# Patient Record
Sex: FEMALE | Race: OTHER | HISPANIC OR LATINO | Employment: FULL TIME | URBAN - METROPOLITAN AREA
[De-identification: names, ages, dates, MRNs, and addresses within clinical notes are randomized per-mention and may not be internally consistent; named-entity substitution may affect disease eponyms.]

---

## 2017-01-03 ENCOUNTER — OFFICE VISIT (OUTPATIENT)
Dept: ENDOCRINOLOGY | Facility: CLINIC | Age: 27
End: 2017-01-03
Payer: COMMERCIAL

## 2017-01-03 VITALS
RESPIRATION RATE: 16 BRPM | DIASTOLIC BLOOD PRESSURE: 78 MMHG | BODY MASS INDEX: 22.53 KG/M2 | HEIGHT: 59 IN | SYSTOLIC BLOOD PRESSURE: 108 MMHG | WEIGHT: 111.75 LBS | HEART RATE: 89 BPM

## 2017-01-03 DIAGNOSIS — N91.0 PRIMARY AMENORRHEA: Primary | ICD-10-CM

## 2017-01-03 DIAGNOSIS — R41.840 ATTENTION DEFICIT: ICD-10-CM

## 2017-01-03 PROCEDURE — 99214 OFFICE O/P EST MOD 30 MIN: CPT | Mod: S$GLB,,, | Performed by: INTERNAL MEDICINE

## 2017-01-03 PROCEDURE — 99999 PR PBB SHADOW E&M-EST. PATIENT-LVL IV: CPT | Mod: PBBFAC,,, | Performed by: INTERNAL MEDICINE

## 2017-01-03 PROCEDURE — 1159F MED LIST DOCD IN RCRD: CPT | Mod: S$GLB,,, | Performed by: INTERNAL MEDICINE

## 2017-01-03 NOTE — PROGRESS NOTES
"Subjective: growth hormone deficiency       Patient ID: Ani Montoya is a 26 y.o. female.    Chief Complaint:  Growth hormone deficiency      History of Present Illness  Ms. Ani Montoya is a 26 year old female who was seen by  at initial visit in 2013 for primary amenorrhea and short stature. She is now the patient of Dr. Waller with last documented office visit in 2015. This is the patient's initial visit with me today.     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   Plan was to resume treatment with GH in 2014 but patient did not secondary to insurance coverage     She is 4'11" , all of her family members are above 5'10".   She is sexually active with one partner but reports dyspareunia. She has been to ER once in 2011 with vaginal laceration and required operative management.   Never had breast development. Had breast augmentation surgery in 2014   She was evaluated by  and pediatric endocrinologist in 2007 , was started on Gh replacement therapy which was held due to patient complaining of headache, plan was to resume in 1 week but patient never followed.     In 2014, patient was started on Estradiol, currently taking 0.5mg tab daily     Denies personal history of falls or fractures     BMD from 2014 showed osteoporosis to lumbar spine, total hip and femoral neck   NM bone age scan in 2014, showed bone age of a 14 year old     Review of Systems   Constitutional: Negative for fatigue and unexpected weight change.   HENT: Negative for trouble swallowing and voice change.    Eyes: Negative for visual disturbance.   Respiratory: Negative for shortness of breath.    Cardiovascular: Negative for palpitations and leg swelling.   Gastrointestinal: Negative for constipation and diarrhea.   Endocrine: Negative for cold intolerance and heat intolerance.   Genitourinary: Positive for dyspareunia and menstrual " problem (amenorrhea ).   Neurological: Negative for dizziness, light-headedness and headaches.   Psychiatric/Behavioral: Negative for behavioral problems and confusion.       Objective:   Physical Exam   Constitutional: She is oriented to person, place, and time.   Eyes: EOM are normal.   Neck: No thyromegaly present.   Cardiovascular: Normal rate and regular rhythm.    Pulmonary/Chest: Effort normal and breath sounds normal.   Beast augmentation    Lymphadenopathy:     She has no cervical adenopathy.   Neurological: She is alert and oriented to person, place, and time. She has normal reflexes.   Skin: Skin is warm.   Psychiatric: She has a normal mood and affect. Her behavior is normal.   Nursing note reviewed.      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 She has GH deficiency as well gonadotropin deficiency.   Goal of treatment:  1. To optimize final height  2. Optimize bone mass  3. Better quality of life.  4. In future may be fertility     #1. GH deficiency     - Start her on growth hormone treatment  - We had got approval for her GH but she never received.   - Again emphasized on getting GH therapy for above mentioned goals.       #2. Gonadotropin deficiency- primary amenorrhea.    - on estradiol 0.5 mg PO daily.       Will discuss case in detail with Dr. Waller for further recommendations   Patient request referral for PCP and GYN

## 2017-01-03 NOTE — MR AVS SNAPSHOT
[facility] - Endo/Diab/Metab  [address]  [facility] [address]  Phone: [phone]  Fax: [phone]                  Ani Jan   1/3/2017 9:00 AM   Office Visit    Description:  Female : 1990   Provider:  Kirsten Pineda NP   [facility]  [facility]           Reason for Visit     Growth hormone deficiency           Diagnoses this Visit        Comments    Primary amenorrhea    -  Primary     Attention deficit                To Do List           Future Appointments        Provider Department Dept Phone    2017 3:30 PM LEXY Mckenzie [facility] - Internal Medicine [phone]    2017 2:30 PM Kyle Cam MD [place] - [phone]      Goals (5 Years of Data)     None      [facility]     [facility] -  Assistance  Registered nurses in the [facility] On Call Center provide clinical advisement, health education, appointment booking, and other advisory services.  Call for this free service at [phone].             Medications           Message regarding Medications     Verify the changes and/or additions to your medication regime listed below are the same as discussed with your clinician today.  If any of these changes or additions are incorrect, please notify your healthcare provider.        STOP taking these medications     somatropin (NUTROPIN) 5 mg SolR Inject 1.2 mg into the skin once daily.           Verify that the below list of medications is an accurate representation of the medications you are currently taking.  If none reported, the list may be blank. If incorrect, please contact your healthcare provider. Carry this list with you in case of emergency.           Current Medications     estradiol (ESTRACE) 0.5 MG tablet Take 1 tablet (0.5 mg total) by mouth once daily.           Clinical Reference Information           Vital Signs - Last Recorded  Most recent update: 1/3/2017  9:33 AM by Chary  "KYLIE Busby MA    BP Pulse Resp Ht Wt BMI    108/78 (BP Location: Left arm, Patient Position: Sitting, BP Method: Manual) 89 16 4' 11" (1.499 m) 50.7 kg (111 lb 12.4 oz) 22.58 kg/m2      Blood Pressure          Most Recent Value    BP  108/78      Allergies as of 1/3/2017     No Known Allergies      Immunizations Administered on Date of Encounter - 1/3/2017     None      Orders Placed During Today's Visit      Normal Orders This Visit    Ambulatory Referral to Gynecology     Ambulatory referral to Internal Medicine       "

## 2017-01-04 ENCOUNTER — OFFICE VISIT (OUTPATIENT)
Dept: INTERNAL MEDICINE | Facility: CLINIC | Age: 27
End: 2017-01-04
Payer: COMMERCIAL

## 2017-01-04 ENCOUNTER — LAB VISIT (OUTPATIENT)
Dept: LAB | Facility: HOSPITAL | Age: 27
End: 2017-01-04
Payer: COMMERCIAL

## 2017-01-04 VITALS
WEIGHT: 109.81 LBS | BODY MASS INDEX: 22.14 KG/M2 | SYSTOLIC BLOOD PRESSURE: 108 MMHG | HEART RATE: 63 BPM | DIASTOLIC BLOOD PRESSURE: 66 MMHG | HEIGHT: 59 IN

## 2017-01-04 DIAGNOSIS — R53.83 FATIGUE, UNSPECIFIED TYPE: Primary | ICD-10-CM

## 2017-01-04 DIAGNOSIS — E23.0 HYPOPITUITARISM: ICD-10-CM

## 2017-01-04 DIAGNOSIS — R53.83 FATIGUE, UNSPECIFIED TYPE: ICD-10-CM

## 2017-01-04 PROBLEM — M81.0 OSTEOPOROSIS: Status: ACTIVE | Noted: 2017-01-04

## 2017-01-04 LAB — TSH SERPL DL<=0.005 MIU/L-ACNC: 1.67 UIU/ML

## 2017-01-04 PROCEDURE — 1159F MED LIST DOCD IN RCRD: CPT | Mod: S$GLB,,, | Performed by: STUDENT IN AN ORGANIZED HEALTH CARE EDUCATION/TRAINING PROGRAM

## 2017-01-04 PROCEDURE — 99999 PR PBB SHADOW E&M-EST. PATIENT-LVL III: CPT | Mod: PBBFAC,,, | Performed by: STUDENT IN AN ORGANIZED HEALTH CARE EDUCATION/TRAINING PROGRAM

## 2017-01-04 PROCEDURE — 84443 ASSAY THYROID STIM HORMONE: CPT

## 2017-01-04 PROCEDURE — 82024 ASSAY OF ACTH: CPT

## 2017-01-04 PROCEDURE — 36415 COLL VENOUS BLD VENIPUNCTURE: CPT

## 2017-01-04 PROCEDURE — 99203 OFFICE O/P NEW LOW 30 MIN: CPT | Mod: S$GLB,,, | Performed by: STUDENT IN AN ORGANIZED HEALTH CARE EDUCATION/TRAINING PROGRAM

## 2017-01-04 NOTE — MR AVS SNAPSHOT
"    [facility] - Internal Medicine  [address]  [place] [address]  Phone: [phone]  Fax: [phone]                  Ani Montoya   2017 3:30 PM   Office Visit    Description:  Female : 1990   Provider:  LEXY Mckenzie   Department:  [facility] - Internal Medicine           Reason for Visit     Establish Care           Diagnoses this Visit        Comments    Fatigue, unspecified type    -  Primary     Hypopituitarism                To Do List           Future Appointments        Provider Department Dept Phone    2017 2:30 PM MD [place] - [phone]      Goals (5 Years of Data)     None      Follow-Up and Disposition     Return in about 6 months (around 2017).      [facility]     [facility] Care [facility] -  Assistance  Registered nurses in the [facility] On Call Center provide clinical advisement, health education, appointment booking, and other advisory services.  Call for this free service at [phone].             Medications           Message regarding Medications     Verify the changes and/or additions to your medication regime listed below are the same as discussed with your clinician today.  If any of these changes or additions are incorrect, please notify your healthcare provider.             Verify that the below list of medications is an accurate representation of the medications you are currently taking.  If none reported, the list may be blank. If incorrect, please contact your healthcare provider. Carry this list with you in case of emergency.           Current Medications     estradiol (ESTRACE) 0.5 MG tablet Take 1 tablet (0.5 mg total) by mouth once daily.           Clinical Reference Information           Vital Signs - Last Recorded  Most recent update: 2017  3:29 PM by Shayy Boyd MA    BP Pulse Ht Wt BMI    108/66 (BP Location: Right arm, Patient Position: Sitting, BP Method: Manual) 63 4' 11" " (1.499 m) 49.8 kg (109 lb 12.6 oz) 22.17 kg/m2      Blood Pressure          Most Recent Value    BP  108/66      Allergies as of 1/4/2017     No Known Allergies      Immunizations Administered on Date of Encounter - 1/4/2017     None      Orders Placed During Today's Visit     Future Labs/Procedures Expected by Expires    ACTH  1/4/2017 3/5/2018    TSH  1/4/2017 3/5/2018

## 2017-01-04 NOTE — Clinical Note
Concentration problem is not major functional burden, i will order TSH,ACTH for now.  Not sure though about the hypopituitarism and how we manage those if hormonal deficiency is the case in that setting.  And should we start treating this patient osteoporosis with something else other than growth hormone ( calcium, Vit D and bisphosphonate )? Thank you

## 2017-01-04 NOTE — PROGRESS NOTES
Subjective:       Patient ID: Ani Montoya is a 26 y.o. female.    Chief Complaint: Establish Care    HPI     26 years old female with Hx of hypopituitarism, primary amenorrhea, osteoporosis and short stature present to the resident clinic to establish care and for concentration/fatigue complaint.   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 Also reported no major changes in her current financial of emotional state, also reported no OTC or herbal meds.   She follow endocrine, and recnetly found to have low growth hormone and planned to replace it.      Review of Systems   Constitutional: Negative for activity change, chills, fever and unexpected weight change.   HENT: Negative for ear discharge, ear pain, mouth sores and trouble swallowing.    Eyes: Negative for pain and redness.   Respiratory: Negative for cough and shortness of breath.    Cardiovascular: Negative for chest pain and palpitations.   Gastrointestinal: Negative for abdominal distention, abdominal pain, blood in stool and nausea.   Endocrine: Negative for polydipsia and polyphagia.   Genitourinary: Negative for dysuria and hematuria.   Musculoskeletal: Negative for gait problem and neck stiffness.   Skin: Negative for rash.   Neurological: Negative for dizziness, weakness and numbness.   Hematological: Negative for adenopathy. Does not bruise/bleed easily.   Psychiatric/Behavioral: Negative for decreased concentration and sleep disturbance.       Objective:      Physical Exam   Constitutional: She is oriented to person, place, and time. She appears well-nourished. No distress.   Short stature    HENT:   Head: Normocephalic and atraumatic.   Mouth/Throat: No oropharyngeal exudate.   Eyes: No scleral icterus.   Neck: Neck supple. No thyromegaly present.   Cardiovascular: Normal rate and regular rhythm.    Pulmonary/Chest: Effort normal. No respiratory distress. She has no wheezes.    Abdominal: Soft. She exhibits no distension.   Musculoskeletal: She exhibits no tenderness or deformity.   Lymphadenopathy:     She has no cervical adenopathy.   Neurological: She is alert and oriented to person, place, and time.   Skin: Skin is warm. No rash noted. No erythema.   Psychiatric: She has a normal mood and affect. Her behavior is normal. Judgment and thought content normal.       Assessment:       1. Fatigue, unspecified type    2. Hypopituitarism        Plan:         21 years old lady with concentration problem    Fatigue/concentration , unspecified type:  -- Dx include hormonal deficiency ( hypothyroid, hypoaldosteronism) or mood disorder or ADHD  -- will order TSH and ACTH  -- if comes back normal and after her growth hormone initiated still having the same problem, will refer to phycologist to rule out depression or ADHD.           Isabela Middleton   PGY 2  Internal medicine   [phone]

## 2017-01-06 LAB — ACTH PLAS-MCNC: 21 PG/ML

## 2017-01-09 ENCOUNTER — TELEPHONE (OUTPATIENT)
Dept: INTERNAL MEDICINE | Facility: CLINIC | Age: 27
End: 2017-01-09

## 2017-01-11 ENCOUNTER — OFFICE VISIT (OUTPATIENT)
Dept: OBSTETRICS AND GYNECOLOGY | Facility: CLINIC | Age: 27
End: 2017-01-11
Payer: COMMERCIAL

## 2017-01-11 VITALS
WEIGHT: 109.81 LBS | BODY MASS INDEX: 22.14 KG/M2 | DIASTOLIC BLOOD PRESSURE: 60 MMHG | SYSTOLIC BLOOD PRESSURE: 102 MMHG | HEIGHT: 59 IN

## 2017-01-11 DIAGNOSIS — N91.2: Primary | ICD-10-CM

## 2017-01-11 DIAGNOSIS — Z78.0 MENOPAUSE: ICD-10-CM

## 2017-01-11 DIAGNOSIS — N91.2 AMENORRHEA: ICD-10-CM

## 2017-01-11 DIAGNOSIS — Z78.0 MENOPAUSE: Primary | ICD-10-CM

## 2017-01-11 DIAGNOSIS — Z01.419 WELL WOMAN EXAM WITH ROUTINE GYNECOLOGICAL EXAM: ICD-10-CM

## 2017-01-11 PROCEDURE — 99395 PREV VISIT EST AGE 18-39: CPT | Mod: S$GLB,,, | Performed by: OBSTETRICS & GYNECOLOGY

## 2017-01-11 PROCEDURE — 76830 TRANSVAGINAL US NON-OB: CPT | Mod: S$GLB,,, | Performed by: OBSTETRICS & GYNECOLOGY

## 2017-01-11 PROCEDURE — 88175 CYTOPATH C/V AUTO FLUID REDO: CPT

## 2017-01-11 PROCEDURE — 99999 PR PBB SHADOW E&M-EST. PATIENT-LVL III: CPT | Mod: PBBFAC,,, | Performed by: OBSTETRICS & GYNECOLOGY

## 2017-01-11 RX ORDER — ESTRADIOL 0.5 MG/1
0.5 TABLET ORAL DAILY
Qty: 90 TABLET | Refills: 4 | Status: SHIPPED | OUTPATIENT
Start: 2017-01-11 | End: 2018-01-27 | Stop reason: SDUPTHER

## 2017-01-11 NOTE — PROGRESS NOTES
Subjective:       Patient ID: Ani Montoya is a 26 y.o. female.    Chief Complaint: Well Woman (annual she has a hormone deficency she need estrogen and pregestone)    This patient has a history of primary hypopituitarism.  She has received growth hormone in the past and is presently taking 0.5 mg estradiol daily.  She had minimal breast development and has bilateral augmentation mammoplasty the present time.  She development of other normal secondary sexual characteristics.  Discussion with the patient including potential for future pregnancy, hormonal support, and management of a diagnosis of osteoporosis.    HPI  Review of Systems   Gastrointestinal: Negative for abdominal distention, abdominal pain, constipation and nausea.   Genitourinary: Negative for dyspareunia, dysuria, genital sores, pelvic pain, vaginal bleeding and vaginal discharge.       Objective:      Physical Exam   Constitutional: She appears well-developed and well-nourished.   Pulmonary/Chest: Right breast exhibits no mass, no nipple discharge, no skin change and no tenderness. Left breast exhibits no mass, no nipple discharge, no skin change and no tenderness.   Abdominal: Soft. Bowel sounds are normal. She exhibits no distension and no mass. There is no tenderness. There is no rebound and no guarding. Hernia confirmed negative in the right inguinal area and confirmed negative in the left inguinal area.   Genitourinary: Rectum normal, vagina normal and uterus normal. No breast tenderness or discharge. There is no lesion on the right labia. There is no lesion on the left labia. Uterus is not fixed and not tender. Cervix exhibits no motion tenderness, no discharge and no friability. Right adnexum displays no mass, no tenderness and no fullness. Left adnexum displays no mass, no tenderness and no fullness. No tenderness in the vagina. No vaginal discharge found.   Lymphadenopathy:        Right: No inguinal adenopathy present.        Left: No  inguinal adenopathy present.     uterus is small, freely movable, and there are no adnexal masses.  Ultrasound shows uterine volume of 29.4 with no abnormalities and a 4.8 mm endometrial thickness.  Ovaries are normal but also small at 1.6 and 1.3 mL each.  Assessment:       1. Menopause    2. Well woman exam with routine gynecological exam    3. Amenorrhea        Plan:         annual GYN visit.  Will start patient on Prometrium 100 mg in addition to her estradiol.  Change in estradiol possible in the future but not at this time.  Patient will be given Boniva or other bisphosphonate bone builder but a bone density test will be done first.  Prescription will be called out when the results are called out to the patient.  Future possibilities of pregnancy and definitive answers to her questions are deferred to fertility experts a future time if she is not interested in pregnancy right now.

## 2017-01-11 NOTE — MR AVS SNAPSHOT
"    [place] - OB/GYN  [address]  [address], [address]  [place] [address]  Phone: [phone]                  Ani Montoya   2017 2:30 PM   Office Visit    Description:  Female : 1990   Provider:  Kyle Cam MD   Department:  [place] - OB/GYN           Reason for Visit     Well Woman                To Do List           Future Appointments        Provider Department Dept Phone    2017 2:30 PM Kyle Cam MD [place] - [phone]      Goals (5 Years of Data)     None      [facility]     [facility] On Call Nurse Care Line -  Assistance  Registered nurses in the [facility] On Call Center provide clinical advisement, health education, appointment booking, and other advisory services.  Call for this free service at [phone].             Medications           Message regarding Medications     Verify the changes and/or additions to your medication regime listed below are the same as discussed with your clinician today.  If any of these changes or additions are incorrect, please notify your healthcare provider.             Verify that the below list of medications is an accurate representation of the medications you are currently taking.  If none reported, the list may be blank. If incorrect, please contact your healthcare provider. Carry this list with you in case of emergency.           Current Medications     estradiol (ESTRACE) 0.5 MG tablet Take 1 tablet (0.5 mg total) by mouth once daily.           Clinical Reference Information           Vital Signs - Last Recorded  Most recent update: 2017  2:26 PM by Joyce García MA    BP Ht Wt BMI       102/60 4' 11" (1.499 m) 49.8 kg (109 lb 12.6 oz) 22.17 kg/m2       Blood Pressure          Most Recent Value    BP  102/60      Allergies as of 2017     No Known Allergies      Immunizations Administered on Date of Encounter - 2017     None      "

## 2017-01-12 ENCOUNTER — LAB VISIT (OUTPATIENT)
Dept: LAB | Facility: HOSPITAL | Age: 27
End: 2017-01-12
Attending: OBSTETRICS & GYNECOLOGY
Payer: COMMERCIAL

## 2017-01-12 ENCOUNTER — TELEPHONE (OUTPATIENT)
Dept: OBSTETRICS AND GYNECOLOGY | Facility: CLINIC | Age: 27
End: 2017-01-12

## 2017-01-12 DIAGNOSIS — N91.2 AMENORRHEA: ICD-10-CM

## 2017-01-12 DIAGNOSIS — M81.0 OSTEOPOROSIS: Primary | ICD-10-CM

## 2017-01-12 LAB
ESTRADIOL SERPL-MCNC: 26 PG/ML
FSH SERPL-ACNC: 2.2 MIU/ML
LH SERPL-ACNC: 1.4 MIU/ML
PROLACTIN SERPL IA-MCNC: 31 NG/ML

## 2017-01-12 PROCEDURE — 83001 ASSAY OF GONADOTROPIN (FSH): CPT

## 2017-01-12 PROCEDURE — 82670 ASSAY OF TOTAL ESTRADIOL: CPT

## 2017-01-12 PROCEDURE — 36415 COLL VENOUS BLD VENIPUNCTURE: CPT

## 2017-01-12 PROCEDURE — 83002 ASSAY OF GONADOTROPIN (LH): CPT

## 2017-01-12 PROCEDURE — 84146 ASSAY OF PROLACTIN: CPT

## 2017-01-12 RX ORDER — IBANDRONATE SODIUM 150 MG/1
150 TABLET, FILM COATED ORAL
Qty: 3 TABLET | Refills: 4 | Status: SHIPPED | OUTPATIENT
Start: 2017-01-12 | End: 2017-12-12

## 2017-01-12 RX ORDER — PROGESTERONE 100 MG/1
100 CAPSULE ORAL DAILY
Qty: 90 CAPSULE | Refills: 4 | Status: SHIPPED | OUTPATIENT
Start: 2017-01-12 | End: 2017-12-12

## 2017-01-12 NOTE — TELEPHONE ENCOUNTER
Patient states that she was supposed to have a rx sent in for Progesterone and Boniva. She also needs a bone density order.    Please advise.

## 2017-01-12 NOTE — TELEPHONE ENCOUNTER
----- Message from Hilaria Esqueda sent at 1/12/2017 10:06 AM CST -----  Contact: self/[phone]  Patient is scheduled for labs today and wants to know if it is fasting, she also stated that she was supposed to have a bone density scan but no orders are in the system.  Please advise

## 2017-01-16 ENCOUNTER — TELEPHONE (OUTPATIENT)
Dept: OBSTETRICS AND GYNECOLOGY | Facility: CLINIC | Age: 27
End: 2017-01-16

## 2017-01-16 ENCOUNTER — HOSPITAL ENCOUNTER (OUTPATIENT)
Dept: RADIOLOGY | Facility: HOSPITAL | Age: 27
Discharge: HOME | End: 2017-01-16
Attending: OBSTETRICS & GYNECOLOGY
Payer: COMMERCIAL

## 2017-01-16 DIAGNOSIS — M81.0 OSTEOPOROSIS: ICD-10-CM

## 2017-01-16 PROCEDURE — 77080 DXA BONE DENSITY AXIAL: CPT | Mod: 26,,, | Performed by: RADIOLOGY

## 2017-01-16 PROCEDURE — 77080 DXA BONE DENSITY AXIAL: CPT | Mod: TC

## 2017-01-16 NOTE — TELEPHONE ENCOUNTER
Inform the patient that her hormone levels are all low but in a good ratio to each other.  Recommend she take the hormones as prescribed.  Bone density test shows osteopenia but very close to osteoporosis.  It looks much better than the previous bone density test.  Okay to take the Boniva.

## 2017-01-17 ENCOUNTER — TELEPHONE (OUTPATIENT)
Dept: ENDOCRINOLOGY | Facility: CLINIC | Age: 27
End: 2017-01-17

## 2017-01-18 ENCOUNTER — TELEPHONE (OUTPATIENT)
Dept: OBSTETRICS AND GYNECOLOGY | Facility: CLINIC | Age: 27
End: 2017-01-18

## 2017-12-12 ENCOUNTER — HOSPITAL ENCOUNTER (EMERGENCY)
Facility: HOSPITAL | Age: 27
Discharge: HOME | End: 2017-12-12
Attending: EMERGENCY MEDICINE

## 2017-12-12 VITALS
RESPIRATION RATE: 18 BRPM | OXYGEN SATURATION: 99 % | HEART RATE: 63 BPM | SYSTOLIC BLOOD PRESSURE: 109 MMHG | WEIGHT: 108 LBS | HEIGHT: 59 IN | BODY MASS INDEX: 21.77 KG/M2 | TEMPERATURE: 98 F | DIASTOLIC BLOOD PRESSURE: 71 MMHG

## 2017-12-12 DIAGNOSIS — K59.00 CONSTIPATION, UNSPECIFIED CONSTIPATION TYPE: Primary | ICD-10-CM

## 2017-12-12 DIAGNOSIS — R10.9 ABDOMINAL PAIN: ICD-10-CM

## 2017-12-12 DIAGNOSIS — R07.9 CHEST PAIN: ICD-10-CM

## 2017-12-12 LAB
ALBUMIN SERPL BCP-MCNC: 3.8 G/DL
ALP SERPL-CCNC: 100 U/L
ALT SERPL W/O P-5'-P-CCNC: 18 U/L
ANION GAP SERPL CALC-SCNC: 8 MMOL/L
AST SERPL-CCNC: 22 U/L
B-HCG UR QL: NEGATIVE
BASOPHILS # BLD AUTO: 0.02 K/UL
BASOPHILS NFR BLD: 0.3 %
BILIRUB SERPL-MCNC: 0.3 MG/DL
BILIRUB UR QL STRIP: NEGATIVE
BUN SERPL-MCNC: 13 MG/DL
CALCIUM SERPL-MCNC: 9.1 MG/DL
CHLORIDE SERPL-SCNC: 107 MMOL/L
CLARITY UR: ABNORMAL
CO2 SERPL-SCNC: 24 MMOL/L
COLOR UR: YELLOW
CREAT SERPL-MCNC: 0.8 MG/DL
CTP QC/QA: YES
D DIMER PPP IA.FEU-MCNC: <0.19 MG/L FEU
DIFFERENTIAL METHOD: NORMAL
EOSINOPHIL # BLD AUTO: 0.2 K/UL
EOSINOPHIL NFR BLD: 1.9 %
ERYTHROCYTE [DISTWIDTH] IN BLOOD BY AUTOMATED COUNT: 14.1 %
EST. GFR  (AFRICAN AMERICAN): >60 ML/MIN/1.73 M^2
EST. GFR  (NON AFRICAN AMERICAN): >60 ML/MIN/1.73 M^2
GLUCOSE SERPL-MCNC: 104 MG/DL
GLUCOSE UR QL STRIP: NEGATIVE
HCT VFR BLD AUTO: 40.6 %
HGB BLD-MCNC: 13.4 G/DL
HGB UR QL STRIP: NEGATIVE
KETONES UR QL STRIP: NEGATIVE
LEUKOCYTE ESTERASE UR QL STRIP: NEGATIVE
LIPASE SERPL-CCNC: 19 U/L
LYMPHOCYTES # BLD AUTO: 2.1 K/UL
LYMPHOCYTES NFR BLD: 27.1 %
MCH RBC QN AUTO: 29.6 PG
MCHC RBC AUTO-ENTMCNC: 33 G/DL
MCV RBC AUTO: 90 FL
MONOCYTES # BLD AUTO: 0.6 K/UL
MONOCYTES NFR BLD: 7.8 %
NEUTROPHILS # BLD AUTO: 4.9 K/UL
NEUTROPHILS NFR BLD: 62.5 %
NITRITE UR QL STRIP: NEGATIVE
PH UR STRIP: 7 [PH] (ref 5–8)
PLATELET # BLD AUTO: 247 K/UL
PMV BLD AUTO: 9.9 FL
POTASSIUM SERPL-SCNC: 4.3 MMOL/L
PROT SERPL-MCNC: 7.8 G/DL
PROT UR QL STRIP: NEGATIVE
RBC # BLD AUTO: 4.52 M/UL
SODIUM SERPL-SCNC: 139 MMOL/L
SP GR UR STRIP: 1.01 (ref 1–1.03)
URN SPEC COLLECT METH UR: ABNORMAL
UROBILINOGEN UR STRIP-ACNC: NEGATIVE EU/DL
WBC # BLD AUTO: 7.81 K/UL

## 2017-12-12 PROCEDURE — 81025 URINE PREGNANCY TEST: CPT | Performed by: EMERGENCY MEDICINE

## 2017-12-12 PROCEDURE — 85025 COMPLETE CBC W/AUTO DIFF WBC: CPT

## 2017-12-12 PROCEDURE — 99284 EMERGENCY DEPT VISIT MOD MDM: CPT | Mod: 25

## 2017-12-12 PROCEDURE — 80053 COMPREHEN METABOLIC PANEL: CPT

## 2017-12-12 PROCEDURE — 83690 ASSAY OF LIPASE: CPT

## 2017-12-12 PROCEDURE — 81003 URINALYSIS AUTO W/O SCOPE: CPT

## 2017-12-12 PROCEDURE — 85379 FIBRIN DEGRADATION QUANT: CPT

## 2017-12-13 NOTE — ED PROVIDER NOTES
Encounter Date: 12/12/2017       History     Chief Complaint   Patient presents with    Abdominal Pain     left abdominal pain radiating to lower back since Friday, worse today.  No dysuria.  Patient has been constipated.  Last BM this morning hard and small amount.     Left-sided flank and abdominal pain that radiates into left lower chest and lower back.  The patient denies any injury.  Reports that taking deep breaths makes the symptoms worse.  Denies any nausea or vomiting.  No urinary symptoms.  No bowel or bladder dysfunction or lower extremity numbness or weakness. Patient has a history of constipation with recent worsening and small hard stool this morning.        The history is provided by the patient.   Abdominal Pain   The current episode started several days ago. The other symptoms of the illness do not include fever, shortness of breath, nausea or dysuria.   Symptoms associated with the illness do not include back pain.     Review of patient's allergies indicates:  No Known Allergies  Past Medical History:   Diagnosis Date    Hypopituitarism     Osteoporosis     Primary amenorrhea      Past Surgical History:   Procedure Laterality Date    breast aug Bilateral 2012     Family History   Problem Relation Age of Onset    Cancer Maternal Grandfather      unknown    Cancer Cousin      brain    Short stature Maternal Grandmother      4.11`     Social History   Substance Use Topics    Smoking status: Never Smoker    Smokeless tobacco: Not on file    Alcohol use Yes      Comment: occ     Review of Systems   Constitutional: Negative for fever.   HENT: Negative for sore throat.    Respiratory: Negative for shortness of breath.    Cardiovascular: Positive for chest pain. Negative for leg swelling.   Gastrointestinal: Positive for abdominal pain. Negative for nausea.   Genitourinary: Negative for dysuria.   Musculoskeletal: Negative for back pain.   Skin: Negative for rash.   Neurological: Negative for  weakness.   Hematological: Does not bruise/bleed easily.   All other systems reviewed and are negative.      Physical Exam     Initial Vitals [12/12/17 1612]   BP Pulse Resp Temp SpO2   (!) 138/93 74 16 97.5 °F (36.4 °C) --      MAP       108         Physical Exam    Nursing note and vitals reviewed.  Constitutional: She appears well-developed and well-nourished.   HENT:   Head: Normocephalic and atraumatic.   Eyes: Conjunctivae and EOM are normal. Pupils are equal, round, and reactive to light. Right eye exhibits no discharge. Left eye exhibits no discharge. No scleral icterus.   Neck: Normal range of motion. Neck supple.   Cardiovascular: Normal rate, regular rhythm and normal heart sounds. Exam reveals no gallop and no friction rub.    No murmur heard.  Pulmonary/Chest: Breath sounds normal. No stridor. No respiratory distress. She has no wheezes. She has no rhonchi. She has no rales.   Abdominal: Soft. Bowel sounds are normal. She exhibits no distension and no mass. There is no tenderness. There is no rebound and no guarding.   Neurological: She is alert and oriented to person, place, and time. She has normal strength. No cranial nerve deficit or sensory deficit.   Skin: Skin is warm and dry.   Psychiatric: She has a normal mood and affect. Her behavior is normal. Judgment and thought content normal.         ED Course   Procedures  Labs Reviewed   URINALYSIS - Abnormal; Notable for the following:        Result Value    Appearance, UA Hazy (*)     All other components within normal limits   CBC W/ AUTO DIFFERENTIAL   COMPREHENSIVE METABOLIC PANEL   LIPASE   D DIMER, QUANTITATIVE   POCT URINE PREGNANCY        Imaging Results          X-Ray Chest PA And Lateral (Final result)  Result time 12/12/17 17:06:38    Final result by Kerry Villarreal MD (12/12/17 17:06:38)                 Impression:     No acute cardiopulmonary process.          Electronically signed by: KERRY VILLARREAL MD  Date:      12/12/17  Time:    17:06              Narrative:    Chest PA and lateral    Indication:Chest pain    Comparison:None    Findings:  The cardiomediastinal silhouette is not enlarged.  There is no pleural effusion.  The trachea is midline.  The lungs are symmetrically expanded bilaterally without evidence of acute parenchymal process. No large focal consolidation seen.  There is no pneumothorax.  The osseous structures are unremarkable.                             X-Ray Abdomen AP 1 View (KUB) (Final result)  Result time 12/12/17 17:07:59    Final result by Kerry Villarreal MD (12/12/17 17:07:59)                 Impression:      1.  Nonobstructive bowel gas pattern, possible left nephrolithiasis versus calcification of the bowel (latter favored).      Electronically signed by: KERRY VILLARREAL MD  Date:     12/12/17  Time:    17:07              Narrative:    Abdomen AP 1 view    Clinical history: Unspecified abdominal pain    Comparison: None    Findings:  Single view.    Air and stool is seen within the large bowel, and projected over the rectum.  No focally dilated small bowel loops.  No definite calcification to suggest nephrolithiasis or cholelithiasis noting punctate foci of calcification project over the expected location of the left kidney however are suspected to reside within bowel although cannot be confirmed.  No acute osseous abnormality.  No convincing large volume free air noting supine technique.                                 Medical Decision Making:   Initial Assessment:   Nonreproducible left lower chest and back pain.  The symptoms are somewhat pleuritic.  The patient is on estrogen.  D-dimer and basic labs including urine to evaluate potential causes of pain.    Differential Diagnosis:   Myocardial ischemia, pericarditis, pulmonary embolus, chest wall pain, pleural inflammation and pulmonary infectious causes, musculoskeletal causes, kidney stone, pyelonephritis, shingles, and intra-abdominal  causes such as diverticulitis and appendicitis, constipation    Clinical Tests:   Lab Tests: Ordered and Reviewed  ED Management:  Patient improved with treatment in the emergency department and comfortable going home. Discussed reasons to return and importance of followup.  Patient understands that the emergency visit today is primarily to address immediate concerns and to rule out emergent cause of symptoms and that they may require further workup and evaluation as an outpatient. All questions addressed and patient given discharge instructions and followup information.                      ED Course      Clinical Impression:   The primary encounter diagnosis was Constipation, unspecified constipation type. Diagnoses of Chest pain and Abdominal pain were also pertinent to this visit.    Disposition:   Disposition: Discharged  Condition: Stable                        Allie Silva MD  12/21/17 6668

## 2017-12-15 ENCOUNTER — PATIENT MESSAGE (OUTPATIENT)
Dept: ENDOCRINOLOGY | Facility: CLINIC | Age: 27
End: 2017-12-15

## 2017-12-15 ENCOUNTER — HOSPITAL ENCOUNTER (EMERGENCY)
Facility: HOSPITAL | Age: 27
Discharge: HOME | End: 2017-12-15
Attending: EMERGENCY MEDICINE

## 2017-12-15 VITALS
BODY MASS INDEX: 21.77 KG/M2 | TEMPERATURE: 97 F | DIASTOLIC BLOOD PRESSURE: 74 MMHG | SYSTOLIC BLOOD PRESSURE: 121 MMHG | OXYGEN SATURATION: 99 % | HEART RATE: 73 BPM | HEIGHT: 59 IN | WEIGHT: 108 LBS | RESPIRATION RATE: 18 BRPM

## 2017-12-15 DIAGNOSIS — R10.9 ACUTE LEFT FLANK PAIN: Primary | ICD-10-CM

## 2017-12-15 DIAGNOSIS — M94.0 COSTOCHONDRITIS, ACUTE: ICD-10-CM

## 2017-12-15 LAB
B-HCG UR QL: NEGATIVE
BILIRUB UR QL STRIP: NEGATIVE
CLARITY UR: CLEAR
COLOR UR: YELLOW
CTP QC/QA: YES
GLUCOSE UR QL STRIP: NEGATIVE
HGB UR QL STRIP: NEGATIVE
KETONES UR QL STRIP: NEGATIVE
LEUKOCYTE ESTERASE UR QL STRIP: NEGATIVE
NITRITE UR QL STRIP: NEGATIVE
PH UR STRIP: 7 [PH] (ref 5–8)
PROT UR QL STRIP: NEGATIVE
SP GR UR STRIP: 1.01 (ref 1–1.03)
URN SPEC COLLECT METH UR: NORMAL
UROBILINOGEN UR STRIP-ACNC: NEGATIVE EU/DL

## 2017-12-15 PROCEDURE — 81025 URINE PREGNANCY TEST: CPT | Performed by: EMERGENCY MEDICINE

## 2017-12-15 PROCEDURE — 81003 URINALYSIS AUTO W/O SCOPE: CPT

## 2017-12-15 PROCEDURE — 25000003 PHARM REV CODE 250: Performed by: EMERGENCY MEDICINE

## 2017-12-15 PROCEDURE — 99283 EMERGENCY DEPT VISIT LOW MDM: CPT | Mod: 25

## 2017-12-15 RX ORDER — IBUPROFEN 800 MG/1
800 TABLET ORAL EVERY 6 HOURS PRN
Qty: 20 TABLET | Refills: 0 | Status: SHIPPED | OUTPATIENT
Start: 2017-12-15 | End: 2020-08-26

## 2017-12-15 RX ORDER — IBUPROFEN 800 MG/1
800 TABLET ORAL EVERY 6 HOURS PRN
Qty: 20 TABLET | Refills: 0 | Status: SHIPPED | OUTPATIENT
Start: 2017-12-15 | End: 2017-12-15

## 2017-12-15 RX ORDER — IBUPROFEN 400 MG/1
800 TABLET ORAL
Status: COMPLETED | OUTPATIENT
Start: 2017-12-15 | End: 2017-12-15

## 2017-12-15 RX ADMIN — IBUPROFEN 800 MG: 400 TABLET, FILM COATED ORAL at 10:12

## 2017-12-16 NOTE — ED PROVIDER NOTES
Encounter Date: 12/15/2017       History     Chief Complaint   Patient presents with    Abdominal Pain     27y F ambulatory to ED with c/o LUQ abdominal pain sinces Saturday. pt was [place] in [facility] [place]17 for same complaint and diagnosed with constipation.     The patient presents with left upper quadrant abdominal pain.  The pain is been present for 6 days.  She was seen in the emergency department earlier this week and was told she had constipation.  The pain has persisted and is now worsened.  It is occasionally sharp and shooting and located in the left upper quadrant and left flank.  [place] [facility] [place], it became suddenly severe.  She denies nausea or vomiting.  She has shortness of breath associated with the pain.  No fevers or chills.  She took magnesium citrate because she was told she was constipated and she's had bowel movements now.  No dysuria or hematuria.      The history is provided by the patient.     Review of patient's allergies indicates:  No Known Allergies  Past Medical History:   Diagnosis Date    Hypopituitarism     Osteoporosis     Primary amenorrhea      Past Surgical History:   Procedure Laterality Date    breast aug Bilateral 2012     Family History   Problem Relation Age of Onset    Cancer Maternal Grandfather      unknown    Cancer Cousin      brain    Short stature Maternal Grandmother      4.11`     Social History   Substance Use Topics    Smoking status: Never Smoker    Smokeless tobacco: Not on file    Alcohol use Yes      Comment: occ     Review of Systems   Constitutional: Negative for fever.   HENT: Negative for sore throat.    Respiratory: Positive for shortness of breath.    Cardiovascular: Negative for chest pain.   Gastrointestinal: Positive for abdominal pain. Negative for constipation, nausea and vomiting.   Genitourinary: Negative for dysuria.   Musculoskeletal: Negative for back pain.   Skin: Negative for rash.   Neurological: Negative  for weakness.   Hematological: Does not bruise/bleed easily.   All other systems reviewed and are negative.      Physical Exam     Initial Vitals [12/15/17 2050]   BP Pulse Resp Temp SpO2   (!) 134/91 80 17 98.4 °F (36.9 °C) 99 %      MAP       105.33         Physical Exam    Nursing note and vitals reviewed.  Constitutional: She appears well-developed and well-nourished. She is not diaphoretic. No distress.   HENT:   Mouth/Throat: Oropharynx is clear and moist.   Eyes: EOM are normal. Pupils are equal, round, and reactive to light.   Neck: Normal range of motion.   Cardiovascular: Normal rate, regular rhythm, normal heart sounds and intact distal pulses.   Pulmonary/Chest: Breath sounds normal. No respiratory distress. She has no wheezes. She has no rales. She exhibits tenderness.   Abdominal: Soft. Bowel sounds are normal. She exhibits no distension. There is tenderness. There is no rebound and no guarding.   There is tenderness of the left upper quadrant and mid axillary line at the costal margin.  No deformity.  There is mild left CVA tenderness.   Musculoskeletal: Normal range of motion.   Neurological: She is alert and oriented to person, place, and time. She has normal strength. No cranial nerve deficit or sensory deficit.   Skin: Skin is warm and dry. No rash noted. No erythema.   Psychiatric: She has a normal mood and affect.         ED Course   Procedures  Labs Reviewed - No data to display          Medical Decision Making:   History:   Old Medical Records: I decided to obtain old medical records.  Old Records Summarized: records from previous admission(s).       <> Summary of Records: The patient presented with similar complaints on 12/12.  She was diagnosed with constipation.  She had a chest x-ray and abdominal x-ray that time.  Labs including CBC, CMP, d-dimer and urinalysis were normal.  Initial Assessment:   My differential includes renal colic, pyelonephritis, costochondritis.  The patient had a  good workup a few days ago including a d-dimer, so I believe pulmonary embolism is not very likely.  The patient otherwise appears well.  Bedside ultrasound revealed no hydronephrosis.  Her pain was reproducible on palpation.  This could represent costochondritis.  I will treat with NSAIDs and she was given return instructions for flank pain and costochondritis.              Attending Attestation:             Attending ED Notes:   10:47 PM   I performed a limited bedside ultrasound of the left kidney.  There is no hydronephrosis.          ED Course      Clinical Impression:   The primary encounter diagnosis was Acute left flank pain. A diagnosis of Costochondritis, acute was also pertinent to this visit.                           Fransico Stoll MD  12/15/17 [id number]

## 2017-12-16 NOTE — ED TRIAGE NOTES
Pt to ED with c/o left upper abd pain. Pt states that she was seen here on 12/12 and dx with constipation. States that pain has not gotten any better

## 2018-01-27 RX ORDER — ESTRADIOL 0.5 MG/1
TABLET ORAL
Qty: 90 TABLET | Refills: 0 | Status: SHIPPED | OUTPATIENT
Start: 2018-01-27 | End: 2018-06-07 | Stop reason: SDUPTHER

## 2018-06-07 RX ORDER — ESTRADIOL 0.5 MG/1
TABLET ORAL
Qty: 90 TABLET | Refills: 0 | Status: SHIPPED | OUTPATIENT
Start: 2018-06-07 | End: 2018-10-21 | Stop reason: SDUPTHER

## 2018-10-21 RX ORDER — ESTRADIOL 0.5 MG/1
TABLET ORAL
Qty: 90 TABLET | Refills: 0 | Status: SHIPPED | OUTPATIENT
Start: 2018-10-21 | End: 2019-03-08 | Stop reason: SDUPTHER

## 2018-10-24 NOTE — TELEPHONE ENCOUNTER
----- Message from Fara Calix sent at 10/24/2018 12:13 PM CDT -----  Contact: Self [phone]  Patient would like a refill for estradiol (ESTRACE) 0.5 MG tablet sent to [id number] - [place], [place] - [address] AT [facility] [address]. Please advise.

## 2018-10-25 RX ORDER — ESTRADIOL 0.5 MG/1
0.5 TABLET ORAL DAILY
Qty: 30 TABLET | Refills: 0 | Status: SHIPPED | OUTPATIENT
Start: 2018-10-25 | End: 2018-11-12 | Stop reason: SDUPTHER

## 2018-11-12 RX ORDER — ESTRADIOL 0.5 MG/1
0.5 TABLET ORAL DAILY
Qty: 30 TABLET | Refills: 0 | Status: SHIPPED | OUTPATIENT
Start: 2018-11-12 | End: 2018-11-19 | Stop reason: SDUPTHER

## 2018-11-12 NOTE — TELEPHONE ENCOUNTER
----- Message from Krzysztof pSarks sent at 11/12/2018 12:46 PM CST -----  Contact: self [phone]  Patient would like refill of estradiol (ESTRACE) 0.5 MG tablet sent to [id number] - [place] - [address] [facility] AT [facility] OF [facility]. Please advise.

## 2018-11-19 RX ORDER — ESTRADIOL 0.5 MG/1
0.5 TABLET ORAL DAILY
Qty: 30 TABLET | Refills: 2 | Status: SHIPPED | OUTPATIENT
Start: 2018-11-19 | End: 2018-12-19

## 2018-11-19 NOTE — TELEPHONE ENCOUNTER
Patient rescheduled annual appt for January.  Needs refill of Estradiol to get her through until appt.    Please sign order.

## 2019-03-08 RX ORDER — ESTRADIOL 0.5 MG/1
TABLET ORAL
Qty: 90 TABLET | Refills: 0 | Status: SHIPPED | OUTPATIENT
Start: 2019-03-08 | End: 2019-06-26 | Stop reason: SDUPTHER

## 2019-03-26 ENCOUNTER — TELEPHONE (OUTPATIENT)
Dept: OBSTETRICS AND GYNECOLOGY | Facility: CLINIC | Age: 29
End: 2019-03-26

## 2019-03-26 NOTE — TELEPHONE ENCOUNTER
----- Message from Silvina Kapoor sent at 3/26/2019 10:00 AM CDT -----  Contact: [phone]/self  Patient states pharmacy never received Rx estradiol (ESTRACE) 0.5 MG tablet.  Please advise

## 2019-06-26 RX ORDER — ESTRADIOL 0.5 MG/1
TABLET ORAL
Qty: 90 TABLET | Refills: 0 | Status: SHIPPED | OUTPATIENT
Start: 2019-06-26 | End: 2019-12-13 | Stop reason: SDUPTHER

## 2019-11-27 RX ORDER — ESTRADIOL 0.5 MG/1
TABLET ORAL
Qty: 30 TABLET | Refills: 0 | OUTPATIENT
Start: 2019-11-27

## 2019-12-02 ENCOUNTER — TELEPHONE (OUTPATIENT)
Dept: OBSTETRICS AND GYNECOLOGY | Facility: CLINIC | Age: 29
End: 2019-12-02

## 2019-12-02 NOTE — TELEPHONE ENCOUNTER
----- Message from Keila Yoo sent at 12/2/2019  3:35 PM CST -----  Contact: self  Refill request for:  estradiol (ESTRACE) 0.5 MG tablet    Be sent to:  [id number] - [place] - [address] AT [facility] [address]

## 2019-12-03 ENCOUNTER — PATIENT MESSAGE (OUTPATIENT)
Dept: OBSTETRICS AND GYNECOLOGY | Facility: CLINIC | Age: 29
End: 2019-12-03

## 2019-12-06 RX ORDER — ESTRADIOL 0.5 MG/1
TABLET ORAL
Qty: 30 TABLET | Refills: 0 | OUTPATIENT
Start: 2019-12-06

## 2019-12-06 NOTE — TELEPHONE ENCOUNTER
Patient scheduled for annual on 12/13 with Dr. Goff.   Refill of Estradiol has been called into patient's pharmacy.

## 2019-12-06 NOTE — TELEPHONE ENCOUNTER
----- Message from Elmira Headley sent at 12/6/2019 11:47 AM CST -----  Contact: pt- [phone]  Pt is informed that she needs a appt for her refill of RX sh is trying to come as asap but do not see anything open right now - MD Kyle Amaral MD  Outpatient Medication Detail      Disp Refills Start End   estradiol (ESTRACE) 0.5 MG tablet 90 tablet 0 6/26/2019    Sig: TAKE 1 TABLET(0.5 MG) BY MOUTH EVERY DAY   Sent to pharmacy as: estradiol (ESTRACE) 0.5 MG tablet   E-Prescribing Status: Receipt confirmed by pharmacy (6/26/2019  9:27 AM CDT

## 2019-12-13 ENCOUNTER — HOSPITAL ENCOUNTER (OUTPATIENT)
Dept: RADIOLOGY | Facility: HOSPITAL | Age: 29
Discharge: HOME | End: 2019-12-13
Attending: OBSTETRICS & GYNECOLOGY
Payer: COMMERCIAL

## 2019-12-13 ENCOUNTER — OFFICE VISIT (OUTPATIENT)
Dept: OBSTETRICS AND GYNECOLOGY | Facility: CLINIC | Age: 29
End: 2019-12-13
Payer: COMMERCIAL

## 2019-12-13 VITALS
HEIGHT: 59 IN | WEIGHT: 109.19 LBS | BODY MASS INDEX: 22.01 KG/M2 | SYSTOLIC BLOOD PRESSURE: 106 MMHG | DIASTOLIC BLOOD PRESSURE: 66 MMHG

## 2019-12-13 DIAGNOSIS — M81.0 OSTEOPOROSIS WITHOUT CURRENT PATHOLOGICAL FRACTURE, UNSPECIFIED OSTEOPOROSIS TYPE: ICD-10-CM

## 2019-12-13 DIAGNOSIS — Z12.4 SCREENING FOR CERVICAL CANCER: ICD-10-CM

## 2019-12-13 DIAGNOSIS — E23.0 HYPOPITUITARISM: ICD-10-CM

## 2019-12-13 DIAGNOSIS — Z11.3 SCREENING FOR STDS (SEXUALLY TRANSMITTED DISEASES): ICD-10-CM

## 2019-12-13 DIAGNOSIS — N91.0 PRIMARY AMENORRHEA: ICD-10-CM

## 2019-12-13 DIAGNOSIS — Z01.419 WELL WOMAN EXAM WITH ROUTINE GYNECOLOGICAL EXAM: Primary | ICD-10-CM

## 2019-12-13 PROCEDURE — 77080 DEXA BONE DENSITY SPINE HIP: ICD-10-PCS | Mod: 26,,, | Performed by: RADIOLOGY

## 2019-12-13 PROCEDURE — 99999 PR PBB SHADOW E&M-EST. PATIENT-LVL III: ICD-10-PCS | Mod: PBBFAC,,, | Performed by: OBSTETRICS & GYNECOLOGY

## 2019-12-13 PROCEDURE — 99395 PREV VISIT EST AGE 18-39: CPT | Mod: S$GLB,,, | Performed by: OBSTETRICS & GYNECOLOGY

## 2019-12-13 PROCEDURE — 87481 CANDIDA DNA AMP PROBE: CPT | Mod: 59

## 2019-12-13 PROCEDURE — 87491 CHLMYD TRACH DNA AMP PROBE: CPT

## 2019-12-13 PROCEDURE — 88175 CYTOPATH C/V AUTO FLUID REDO: CPT

## 2019-12-13 PROCEDURE — 77080 DXA BONE DENSITY AXIAL: CPT | Mod: TC

## 2019-12-13 PROCEDURE — 87624 HPV HI-RISK TYP POOLED RSLT: CPT

## 2019-12-13 PROCEDURE — 87801 DETECT AGNT MULT DNA AMPLI: CPT

## 2019-12-13 PROCEDURE — 77080 DXA BONE DENSITY AXIAL: CPT | Mod: 26,,, | Performed by: RADIOLOGY

## 2019-12-13 PROCEDURE — 99999 PR PBB SHADOW E&M-EST. PATIENT-LVL III: CPT | Mod: PBBFAC,,, | Performed by: OBSTETRICS & GYNECOLOGY

## 2019-12-13 PROCEDURE — 99395 PR PREVENTIVE VISIT,EST,18-39: ICD-10-PCS | Mod: S$GLB,,, | Performed by: OBSTETRICS & GYNECOLOGY

## 2019-12-13 RX ORDER — ESTRADIOL 0.1 MG/G
CREAM VAGINAL
Qty: 42.5 G | Refills: 1 | Status: SHIPPED | OUTPATIENT
Start: 2019-12-13 | End: 2020-12-21

## 2019-12-13 RX ORDER — MEDROXYPROGESTERONE ACETATE 10 MG/1
5 TABLET ORAL DAILY
Qty: 90 TABLET | Refills: 3 | Status: SHIPPED | OUTPATIENT
Start: 2019-12-13 | End: 2024-01-18

## 2019-12-13 RX ORDER — ESTRADIOL 0.5 MG/1
0.5 TABLET ORAL DAILY
Qty: 90 TABLET | Refills: 3 | Status: SHIPPED | OUTPATIENT
Start: 2019-12-13 | End: 2022-01-21

## 2019-12-13 NOTE — PROGRESS NOTES
Subjective:       Patient ID: Ani Montoya is a 29 y.o. female.    Chief Complaint:  Well Woman      History of Present Illness  28yo  presents for annual exam.  H/o hypopituitarism with primary amenorrhea.  Currently on estrogen, but not taking progesterone.  Denies any vaginal bleeding, has never had cycles.  Sexually active, c/o pain from vaginal dryness.  Nonsmoker.  No other complaints today. Has not seen fertility doctor, but still considering.     GYN & OB History  No LMP recorded.   Date of Last Pap: 2017    OB History    Para Term  AB Living   0 0 0 0 0 0   SAB TAB Ectopic Multiple Live Births   0 0 0 0         Review of Systems  Review of Systems   Constitutional: Negative for chills, fatigue and fever.   HENT: Negative for nasal congestion and mouth sores.    Eyes: Negative for visual disturbance.   Respiratory: Negative for cough and shortness of breath.    Cardiovascular: Negative for chest pain and palpitations.   Gastrointestinal: Negative for abdominal pain, bloating, constipation, diarrhea, nausea and vomiting.   Genitourinary: Positive for dyspareunia, menstrual problem (primary amenorrhea) and vaginal dryness. Negative for pelvic pain, vaginal discharge and vaginal odor.   Musculoskeletal: Negative for arthralgias, back pain and myalgias.   Integumentary:  Negative for rash, mole/lesion and breast mass.   Neurological: Negative for seizures and headaches.   Hematological: Negative for adenopathy. Does not bruise/bleed easily.   Psychiatric/Behavioral: Negative for depression. The patient is not nervous/anxious.    Breast: Negative for lump, mass and mastodynia          Objective:    Physical Exam:   Constitutional: She is oriented to person, place, and time. She appears well-developed and well-nourished.    HENT:   Head: Normocephalic and atraumatic.    Eyes: Conjunctivae and EOM are normal.    Neck: Normal range of motion. Neck supple.    Cardiovascular: Normal  "rate, regular rhythm and normal heart sounds.     Pulmonary/Chest: Effort normal and breath sounds normal.        Abdominal: Soft. She exhibits no distension and no mass. There is no tenderness. No hernia.     Genitourinary:   Genitourinary Comments: Normal-appearing external female genitalia.  No CMT.  Uterus small, mobile, nontender.  No adnexal masses or tenderness.           Musculoskeletal: Normal range of motion and moves all extremeties.       Neurological: She is alert and oriented to person, place, and time.    Skin: Skin is warm and dry.    Psychiatric: She has a normal mood and affect.   Breast: Symmetric, nontender.  No masses.  No skin changes.  No nipple discharge.  Bilateral implants noted.    /66   Ht 4' 11" (1.499 m)   Wt 49.5 kg (109 lb 3.2 oz)   BMI 22.06 kg/m²          Assessment:        1. Well woman exam with routine gynecological exam    2. Screening for STDs (sexually transmitted diseases)    3. Screening for cervical cancer    4. Hypopituitarism    5. Primary amenorrhea    6. Osteoporosis without current pathological fracture, unspecified osteoporosis type              Plan:      1.  Routine annual exam with pap smear and breast exam today.  2.  STD screening.  3.  Discussed with pt, do not recommend unopposed estrogen - will refill estrace, but will also send in provera.  Will also start on estrace vaginal cream for vaginal dryness.  4.  DEXA due to h/o hypopituitarism.  States h/o osteopenia, not currently on any treatment (other than hormones).  5.  Handout for [facility] given.    6.  Counseling done, precautions given, all questions answered.  RTC 1 year for annual, or prn.        "

## 2019-12-16 LAB
BACTERIAL VAGINOSIS DNA: NEGATIVE
CANDIDA GLABRATA DNA: NEGATIVE
CANDIDA KRUSEI DNA: NEGATIVE
CANDIDA RRNA VAG QL PROBE: NEGATIVE
T VAGINALIS RRNA GENITAL QL PROBE: NEGATIVE

## 2019-12-17 LAB
C TRACH DNA SPEC QL NAA+PROBE: NOT DETECTED
N GONORRHOEA DNA SPEC QL NAA+PROBE: NOT DETECTED

## 2019-12-19 LAB
HPV HR 12 DNA SPEC QL NAA+PROBE: NEGATIVE
HPV16 AG SPEC QL: NEGATIVE
HPV18 DNA SPEC QL NAA+PROBE: NEGATIVE

## 2020-01-04 LAB
FINAL PATHOLOGIC DIAGNOSIS: NORMAL
Lab: NORMAL

## 2020-03-21 ENCOUNTER — CLINICAL SUPPORT (OUTPATIENT)
Dept: URGENT CARE | Facility: CLINIC | Age: 30
End: 2020-03-21
Payer: COMMERCIAL

## 2020-03-21 ENCOUNTER — NURSE TRIAGE (OUTPATIENT)
Dept: ADMINISTRATIVE | Facility: CLINIC | Age: 30
End: 2020-03-21

## 2020-03-21 VITALS — WEIGHT: 109 LBS | BODY MASS INDEX: 21.97 KG/M2 | RESPIRATION RATE: 18 BRPM | HEIGHT: 59 IN

## 2020-03-21 DIAGNOSIS — R50.9 FEVER, UNSPECIFIED FEVER CAUSE: Primary | ICD-10-CM

## 2020-03-21 DIAGNOSIS — R50.9 FEVER, UNSPECIFIED: Primary | ICD-10-CM

## 2020-03-21 PROCEDURE — U0002 COVID-19 LAB TEST NON-CDC: HCPCS

## 2020-03-21 NOTE — TELEPHONE ENCOUNTER
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,  Recent hands on with COVID patients all week, productive cough  Chills, bodyaches, headaches     Reason for Disposition   [1] Fever or feeling feverish AND [2] within 14 Days of COVID-19 EXPOSURE (Close Contact)    Additional Information   Negative: Severe difficulty breathing (e.g., struggling for each breath, speak in single words, bluish lips)   Negative: Sounds like a life-threatening emergency to the triager   Negative: [1] Difficulty breathing (shortness of breath) occurs AND [2] onset > 14 days after COVID-19 EXPOSURE (Close Contact)   Negative: [1] Dry cough occurs AND [2] onset > 14 days after COVID-19 EXPOSURE   Negative: [1] Wet cough (i.e., white-yellow, yellow, green, or kal colored sputum) AND [2] onset > 14 days after COVID-19 EXPOSURE   Negative: [1] Common cold symptoms AND [2] onset > 14 days after COVID-19 EXPOSURE   Negative: [1] Difficulty breathing occurs AND [2] within 14 days of COVID-19 EXPOSURE (Close Contact)   Negative: Patient sounds very sick or weak to the triager    Protocols used: CORONAVIRUS (COVID-19) EXPOSURE-A-

## 2020-03-24 ENCOUNTER — TELEPHONE (OUTPATIENT)
Dept: URGENT CARE | Facility: CLINIC | Age: 30
End: 2020-03-24

## 2020-03-24 LAB — SARS-COV-2 RNA RESP QL NAA+PROBE: DETECTED

## 2020-04-21 DIAGNOSIS — Z01.84 ANTIBODY RESPONSE EXAMINATION: ICD-10-CM

## 2020-04-22 ENCOUNTER — LAB VISIT (OUTPATIENT)
Dept: LAB | Facility: HOSPITAL | Age: 30
End: 2020-04-22
Attending: INTERNAL MEDICINE
Payer: COMMERCIAL

## 2020-04-22 DIAGNOSIS — Z01.84 ANTIBODY RESPONSE EXAMINATION: ICD-10-CM

## 2020-04-22 LAB — SARS-COV-2 IGG SERPL QL IA: POSITIVE

## 2020-04-22 PROCEDURE — 36415 COLL VENOUS BLD VENIPUNCTURE: CPT

## 2020-04-22 PROCEDURE — 86769 SARS-COV-2 COVID-19 ANTIBODY: CPT

## 2020-08-17 ENCOUNTER — HOSPITAL ENCOUNTER (INPATIENT)
Facility: HOSPITAL | Age: 30
LOS: 4 days | Discharge: HOME | DRG: 493 | End: 2020-08-21
Attending: ORTHOPAEDIC SURGERY | Admitting: ORTHOPAEDIC SURGERY
Payer: COMMERCIAL

## 2020-08-17 ENCOUNTER — HOSPITAL ENCOUNTER (EMERGENCY)
Facility: HOSPITAL | Age: 30
Discharge: ANOTHER HOSPITAL | End: 2020-08-17
Attending: EMERGENCY MEDICINE
Payer: COMMERCIAL

## 2020-08-17 VITALS
OXYGEN SATURATION: 100 % | HEART RATE: 92 BPM | RESPIRATION RATE: 17 BRPM | DIASTOLIC BLOOD PRESSURE: 90 MMHG | BODY MASS INDEX: 22.18 KG/M2 | TEMPERATURE: 99 F | SYSTOLIC BLOOD PRESSURE: 145 MMHG | WEIGHT: 110 LBS | HEIGHT: 59 IN

## 2020-08-17 DIAGNOSIS — M25.561 RIGHT KNEE PAIN: ICD-10-CM

## 2020-08-17 DIAGNOSIS — Z01.810 PREOP CARDIOVASCULAR EXAM: ICD-10-CM

## 2020-08-17 DIAGNOSIS — S82.141A CLOSED BICONDYLAR FRACTURE OF RIGHT TIBIAL PLATEAU: ICD-10-CM

## 2020-08-17 DIAGNOSIS — S82.141A CLOSED FRACTURE OF RIGHT TIBIAL PLATEAU, INITIAL ENCOUNTER: Primary | ICD-10-CM

## 2020-08-17 LAB
ABO + RH BLD: NORMAL
ALBUMIN SERPL BCP-MCNC: 3.9 G/DL (ref 3.5–5.2)
ALP SERPL-CCNC: 76 U/L (ref 55–135)
ALT SERPL W/O P-5'-P-CCNC: 16 U/L (ref 10–44)
ANION GAP SERPL CALC-SCNC: 12 MMOL/L (ref 8–16)
APTT BLDCRRT: 21.7 SEC (ref 21–32)
AST SERPL-CCNC: 26 U/L (ref 10–40)
BASOPHILS # BLD AUTO: 0.02 K/UL (ref 0–0.2)
BASOPHILS NFR BLD: 0.2 % (ref 0–1.9)
BILIRUB SERPL-MCNC: 0.3 MG/DL (ref 0.1–1)
BLD GP AB SCN CELLS X3 SERPL QL: NORMAL
BUN SERPL-MCNC: 10 MG/DL (ref 6–20)
CALCIUM SERPL-MCNC: 9 MG/DL (ref 8.7–10.5)
CHLORIDE SERPL-SCNC: 110 MMOL/L (ref 95–110)
CO2 SERPL-SCNC: 18 MMOL/L (ref 23–29)
CREAT SERPL-MCNC: 0.9 MG/DL (ref 0.5–1.4)
DIFFERENTIAL METHOD: ABNORMAL
EOSINOPHIL # BLD AUTO: 0.1 K/UL (ref 0–0.5)
EOSINOPHIL NFR BLD: 0.6 % (ref 0–8)
ERYTHROCYTE [DISTWIDTH] IN BLOOD BY AUTOMATED COUNT: 13.3 % (ref 11.5–14.5)
EST. GFR  (AFRICAN AMERICAN): >60 ML/MIN/1.73 M^2
EST. GFR  (NON AFRICAN AMERICAN): >60 ML/MIN/1.73 M^2
ESTIMATED AVG GLUCOSE: 97 MG/DL (ref 68–131)
GLUCOSE SERPL-MCNC: 107 MG/DL (ref 70–110)
HBA1C MFR BLD HPLC: 5 % (ref 4–5.6)
HCG INTACT+B SERPL-ACNC: <1.2 MIU/ML
HCT VFR BLD AUTO: 39.2 % (ref 37–48.5)
HGB BLD-MCNC: 13.1 G/DL (ref 12–16)
IMM GRANULOCYTES # BLD AUTO: 0.03 K/UL (ref 0–0.04)
IMM GRANULOCYTES NFR BLD AUTO: 0.2 % (ref 0–0.5)
INR PPP: 1 (ref 0.8–1.2)
LYMPHOCYTES # BLD AUTO: 2.7 K/UL (ref 1–4.8)
LYMPHOCYTES NFR BLD: 21.3 % (ref 18–48)
MAGNESIUM SERPL-MCNC: 1.8 MG/DL (ref 1.6–2.6)
MCH RBC QN AUTO: 31.2 PG (ref 27–31)
MCHC RBC AUTO-ENTMCNC: 33.4 G/DL (ref 32–36)
MCV RBC AUTO: 93 FL (ref 82–98)
MONOCYTES # BLD AUTO: 0.8 K/UL (ref 0.3–1)
MONOCYTES NFR BLD: 6.2 % (ref 4–15)
NEUTROPHILS # BLD AUTO: 9.2 K/UL (ref 1.8–7.7)
NEUTROPHILS NFR BLD: 71.5 % (ref 38–73)
NRBC BLD-RTO: 0 /100 WBC
PLATELET # BLD AUTO: 213 K/UL (ref 150–350)
PMV BLD AUTO: 10.1 FL (ref 9.2–12.9)
POTASSIUM SERPL-SCNC: 3.8 MMOL/L (ref 3.5–5.1)
PREALB SERPL-MCNC: 25 MG/DL (ref 20–43)
PROT SERPL-MCNC: 7.3 G/DL (ref 6–8.4)
PROTHROMBIN TIME: 11.3 SEC (ref 9–12.5)
RBC # BLD AUTO: 4.2 M/UL (ref 4–5.4)
SARS-COV-2 RDRP RESP QL NAA+PROBE: NEGATIVE
SODIUM SERPL-SCNC: 140 MMOL/L (ref 136–145)
TRANSFERRIN SERPL-MCNC: 237 MG/DL (ref 200–375)
WBC # BLD AUTO: 12.84 K/UL (ref 3.9–12.7)

## 2020-08-17 PROCEDURE — 96374 THER/PROPH/DIAG INJ IV PUSH: CPT

## 2020-08-17 PROCEDURE — 96361 HYDRATE IV INFUSION ADD-ON: CPT

## 2020-08-17 PROCEDURE — 25500020 PHARM REV CODE 255: Performed by: EMERGENCY MEDICINE

## 2020-08-17 PROCEDURE — 85025 COMPLETE CBC W/AUTO DIFF WBC: CPT

## 2020-08-17 PROCEDURE — 85730 THROMBOPLASTIN TIME PARTIAL: CPT

## 2020-08-17 PROCEDURE — 83735 ASSAY OF MAGNESIUM: CPT

## 2020-08-17 PROCEDURE — 25000003 PHARM REV CODE 250: Performed by: PHYSICIAN ASSISTANT

## 2020-08-17 PROCEDURE — 99285 PR EMERGENCY DEPT VISIT,LEVEL V: ICD-10-PCS | Mod: ,,, | Performed by: EMERGENCY MEDICINE

## 2020-08-17 PROCEDURE — 96376 TX/PRO/DX INJ SAME DRUG ADON: CPT

## 2020-08-17 PROCEDURE — 93010 EKG 12-LEAD: ICD-10-PCS | Mod: ,,, | Performed by: INTERNAL MEDICINE

## 2020-08-17 PROCEDURE — 83036 HEMOGLOBIN GLYCOSYLATED A1C: CPT

## 2020-08-17 PROCEDURE — 84702 CHORIONIC GONADOTROPIN TEST: CPT

## 2020-08-17 PROCEDURE — 63600175 PHARM REV CODE 636 W HCPCS: Performed by: STUDENT IN AN ORGANIZED HEALTH CARE EDUCATION/TRAINING PROGRAM

## 2020-08-17 PROCEDURE — 84134 ASSAY OF PREALBUMIN: CPT

## 2020-08-17 PROCEDURE — 96375 TX/PRO/DX INJ NEW DRUG ADDON: CPT

## 2020-08-17 PROCEDURE — 99285 EMERGENCY DEPT VISIT HI MDM: CPT | Mod: ,,, | Performed by: EMERGENCY MEDICINE

## 2020-08-17 PROCEDURE — 99285 EMERGENCY DEPT VISIT HI MDM: CPT | Mod: 25,27

## 2020-08-17 PROCEDURE — 12000002 HC ACUTE/MED SURGE SEMI-PRIVATE ROOM

## 2020-08-17 PROCEDURE — 85610 PROTHROMBIN TIME: CPT

## 2020-08-17 PROCEDURE — 80053 COMPREHEN METABOLIC PANEL: CPT

## 2020-08-17 PROCEDURE — U0002 COVID-19 LAB TEST NON-CDC: HCPCS

## 2020-08-17 PROCEDURE — 25000003 PHARM REV CODE 250: Performed by: EMERGENCY MEDICINE

## 2020-08-17 PROCEDURE — 99285 EMERGENCY DEPT VISIT HI MDM: CPT | Mod: 25

## 2020-08-17 PROCEDURE — 93010 ELECTROCARDIOGRAM REPORT: CPT | Mod: ,,, | Performed by: INTERNAL MEDICINE

## 2020-08-17 PROCEDURE — 84466 ASSAY OF TRANSFERRIN: CPT

## 2020-08-17 PROCEDURE — 63600175 PHARM REV CODE 636 W HCPCS: Performed by: PHYSICIAN ASSISTANT

## 2020-08-17 PROCEDURE — 86901 BLOOD TYPING SEROLOGIC RH(D): CPT

## 2020-08-17 PROCEDURE — 93005 ELECTROCARDIOGRAM TRACING: CPT

## 2020-08-17 RX ORDER — TALC
6 POWDER (GRAM) TOPICAL NIGHTLY PRN
Status: DISCONTINUED | OUTPATIENT
Start: 2020-08-18 | End: 2020-08-21 | Stop reason: HOSPADM

## 2020-08-17 RX ORDER — ACETAMINOPHEN 325 MG/1
650 TABLET ORAL EVERY 8 HOURS PRN
Status: DISCONTINUED | OUTPATIENT
Start: 2020-08-18 | End: 2020-08-19

## 2020-08-17 RX ORDER — LIDOCAINE 50 MG/G
1 PATCH TOPICAL
Status: DISCONTINUED | OUTPATIENT
Start: 2020-08-17 | End: 2020-08-17 | Stop reason: HOSPADM

## 2020-08-17 RX ORDER — MORPHINE SULFATE 2 MG/ML
2 INJECTION, SOLUTION INTRAMUSCULAR; INTRAVENOUS
Status: COMPLETED | OUTPATIENT
Start: 2020-08-17 | End: 2020-08-17

## 2020-08-17 RX ORDER — SODIUM CHLORIDE 9 MG/ML
1000 INJECTION, SOLUTION INTRAVENOUS
Status: COMPLETED | OUTPATIENT
Start: 2020-08-17 | End: 2020-08-17

## 2020-08-17 RX ORDER — LIDOCAINE HYDROCHLORIDE 10 MG/ML
1 INJECTION, SOLUTION EPIDURAL; INFILTRATION; INTRACAUDAL; PERINEURAL ONCE
Status: DISCONTINUED | OUTPATIENT
Start: 2020-08-18 | End: 2020-08-21 | Stop reason: HOSPADM

## 2020-08-17 RX ORDER — SODIUM CHLORIDE 0.9 % (FLUSH) 0.9 %
10 SYRINGE (ML) INJECTION
Status: DISCONTINUED | OUTPATIENT
Start: 2020-08-18 | End: 2020-08-21 | Stop reason: HOSPADM

## 2020-08-17 RX ORDER — ONDANSETRON 4 MG/1
4 TABLET, ORALLY DISINTEGRATING ORAL
Status: COMPLETED | OUTPATIENT
Start: 2020-08-17 | End: 2020-08-17

## 2020-08-17 RX ORDER — MORPHINE SULFATE 4 MG/ML
4 INJECTION, SOLUTION INTRAMUSCULAR; INTRAVENOUS
Status: COMPLETED | OUTPATIENT
Start: 2020-08-17 | End: 2020-08-17

## 2020-08-17 RX ORDER — METHOCARBAMOL 100 MG/ML
1000 INJECTION, SOLUTION INTRAMUSCULAR; INTRAVENOUS ONCE
Status: DISCONTINUED | OUTPATIENT
Start: 2020-08-17 | End: 2020-08-17 | Stop reason: HOSPADM

## 2020-08-17 RX ORDER — OXYCODONE HYDROCHLORIDE 10 MG/1
10 TABLET ORAL EVERY 4 HOURS PRN
Status: DISCONTINUED | OUTPATIENT
Start: 2020-08-18 | End: 2020-08-19

## 2020-08-17 RX ORDER — KETOROLAC TROMETHAMINE 30 MG/ML
15 INJECTION, SOLUTION INTRAMUSCULAR; INTRAVENOUS
Status: COMPLETED | OUTPATIENT
Start: 2020-08-17 | End: 2020-08-17

## 2020-08-17 RX ORDER — OXYCODONE HYDROCHLORIDE 5 MG/1
5 TABLET ORAL EVERY 4 HOURS PRN
Status: DISCONTINUED | OUTPATIENT
Start: 2020-08-18 | End: 2020-08-19

## 2020-08-17 RX ORDER — SODIUM CHLORIDE 9 MG/ML
INJECTION, SOLUTION INTRAVENOUS CONTINUOUS
Status: DISCONTINUED | OUTPATIENT
Start: 2020-08-18 | End: 2020-08-21 | Stop reason: HOSPADM

## 2020-08-17 RX ORDER — ACETAMINOPHEN 325 MG/1
650 TABLET ORAL EVERY 4 HOURS PRN
Status: DISCONTINUED | OUTPATIENT
Start: 2020-08-18 | End: 2020-08-19

## 2020-08-17 RX ORDER — ONDANSETRON 8 MG/1
8 TABLET, ORALLY DISINTEGRATING ORAL EVERY 8 HOURS PRN
Status: DISCONTINUED | OUTPATIENT
Start: 2020-08-18 | End: 2020-08-21 | Stop reason: HOSPADM

## 2020-08-17 RX ORDER — MORPHINE SULFATE 2 MG/ML
2 INJECTION, SOLUTION INTRAMUSCULAR; INTRAVENOUS DAILY PRN
Status: DISCONTINUED | OUTPATIENT
Start: 2020-08-17 | End: 2020-08-17 | Stop reason: HOSPADM

## 2020-08-17 RX ADMIN — MORPHINE SULFATE 2 MG: 2 INJECTION, SOLUTION INTRAMUSCULAR; INTRAVENOUS at 06:08

## 2020-08-17 RX ADMIN — LIDOCAINE 1 PATCH: 50 PATCH TOPICAL at 01:08

## 2020-08-17 RX ADMIN — SODIUM CHLORIDE 1000 ML: 0.9 INJECTION, SOLUTION INTRAVENOUS at 09:08

## 2020-08-17 RX ADMIN — SODIUM CHLORIDE 1000 ML: 0.9 INJECTION, SOLUTION INTRAVENOUS at 10:08

## 2020-08-17 RX ADMIN — SODIUM CHLORIDE 1000 ML: 0.9 INJECTION, SOLUTION INTRAVENOUS at 05:08

## 2020-08-17 RX ADMIN — MORPHINE SULFATE 4 MG: 4 INJECTION INTRAVENOUS at 08:08

## 2020-08-17 RX ADMIN — KETOROLAC TROMETHAMINE 10 MG: 30 INJECTION, SOLUTION INTRAMUSCULAR at 05:08

## 2020-08-17 RX ADMIN — MORPHINE SULFATE 2 MG: 2 INJECTION, SOLUTION INTRAMUSCULAR; INTRAVENOUS at 03:08

## 2020-08-17 RX ADMIN — ONDANSETRON 4 MG: 4 TABLET, ORALLY DISINTEGRATING ORAL at 01:08

## 2020-08-17 RX ADMIN — MORPHINE SULFATE 2 MG: 2 INJECTION, SOLUTION INTRAMUSCULAR; INTRAVENOUS at 01:08

## 2020-08-17 RX ADMIN — IOHEXOL 100 ML: 350 INJECTION, SOLUTION INTRAVENOUS at 04:08

## 2020-08-17 NOTE — HPI
Ani Montoya is a 30 y.o. female with PMH significant for Hypopituitarism, Osteoporosis, and Primary amenorrhea presenting with R knee pain. Patient denies any head trauma or LOC. The patient denies prior hx of falls. Patient denies numbness and tingling. Denies any other musculoskeletal pain or injuries. No known history of prior *** injury or surgery. Walks w/out assisted devices at baseline. Doesn't take any anticoagulation at baseline. Last ate ***

## 2020-08-17 NOTE — ED TRIAGE NOTES
Pt reports tripped over uneven concrete  and fell while walking dog . Obvious deformity noted to Right  Knee, + swelling +2 DP pulse

## 2020-08-17 NOTE — ED PROVIDER NOTES
Encounter Date: 8/17/2020      COVID Statement  The  of Health and Human Services and Chris Carbone, Governor of the [facility], have declared a State of Public Health Emergency due to the spread of a novel coronavirus and disease (COVID-19).  There is no currently accepted treatment except conservative measures and respiratory support if appropriate.  This has lead to significant resource capacity and potential delays in care.          History     Chief Complaint   Patient presents with    Knee Injury     Pt reports tripped and fell. Obvious deformity noted to R knee.      Ani Montoya, a 30 y.o. female  has a past medical history of Hypopituitarism, Osteoporosis, and Primary amenorrhea.     She presents to the ED evaluation of right knee pain after falling on uneven pavement PTA.  Denies head trauma or LOC.  Patient was unable to bear weight to extremity .  Denies h/o of fracture or surgery to site.        The history is provided by the patient.     Review of patient's allergies indicates:  No Known Allergies  Past Medical History:   Diagnosis Date    Hypopituitarism     Osteoporosis     Primary amenorrhea      Past Surgical History:   Procedure Laterality Date    breast aug Bilateral 2012     Family History   Problem Relation Age of Onset    Cancer Maternal Grandfather         unknown    Cancer Cousin         brain    Short stature Maternal Grandmother         4.11`     Social History     Tobacco Use    Smoking status: Never Smoker    Smokeless tobacco: Never Used   Substance Use Topics    Alcohol use: Yes     Comment: occ    Drug use: No     Review of Systems   Musculoskeletal: Positive for arthralgias, gait problem and joint swelling.   Skin: Negative for color change and wound.   Neurological: Positive for numbness. Negative for weakness.   Hematological: Does not bruise/bleed easily.   All other systems reviewed and are negative.      Physical Exam     Initial Vitals   BP  Pulse Resp Temp SpO2   08/17/20 1323 08/17/20 1323 08/17/20 1323 08/17/20 1416 08/17/20 1323   (!) 113/57 85 17 98.4 °F (36.9 °C) 100 %      MAP       --                Physical Exam    Nursing note and vitals reviewed.  Constitutional: She appears well-developed and well-nourished. She is not diaphoretic. No distress.   HENT:   Head: Normocephalic and atraumatic.   Right Ear: External ear normal.   Left Ear: External ear normal.   Nose: Nose normal.   Eyes: Conjunctivae and EOM are normal.   Neck: Normal range of motion.   Cardiovascular: Normal rate and regular rhythm.   Pulmonary/Chest: Breath sounds normal. No respiratory distress.   Musculoskeletal:      Right knee: She exhibits decreased range of motion, swelling and deformity. She exhibits no effusion, no ecchymosis, no laceration, no erythema, normal alignment, no LCL laxity, normal patellar mobility, no bony tenderness, normal meniscus and no MCL laxity. Tenderness found. Medial joint line and lateral joint line tenderness noted.        Right ankle: Normal.      Comments: Obvious deformity of right knee with subluxation of lower limb to knee.  ROM testing limited d/t pain.  2+PD.   Neurological: She is alert and oriented to person, place, and time.   Skin: Skin is warm and dry. Capillary refill takes less than 2 seconds. No rash noted. No erythema.   Psychiatric: She has a normal mood and affect. Thought content normal.         ED Course   Procedures  Labs Reviewed   CBC W/ AUTO DIFFERENTIAL - Abnormal; Notable for the following components:       Result Value    WBC 12.84 (*)     Mean Corpuscular Hemoglobin 31.2 (*)     Gran # (ANC) 9.2 (*)     All other components within normal limits   COMPREHENSIVE METABOLIC PANEL - Abnormal; Notable for the following components:    CO2 18 (*)     All other components within normal limits   HCG, QUANTITATIVE, PREGNANCY   SARS-COV-2 RNA AMPLIFICATION, QUAL          Imaging Results          CTA Lower Extremity (Final  result)  Result time 08/17/20 17:58:50    Final result by Jeremias Valdez DO (08/17/20 17:58:50)                 Impression:      1. No evidence of an acute vascular injury of the right lower extremity.  2. Mildly displaced and comminuted proximal tibial fracture with a large right lipohemarthrosis.      Electronically signed by: Jeremias Valdez  Date:    08/17/2020  Time:    17:58             Narrative:    EXAMINATION:  CTA LOWER EXTREMITY    CLINICAL HISTORY:  The trauma concern for vascular injury.    TECHNIQUE:  CT angiogram of the right lower extremity from the midthigh to the toes after the intravenous administration of 100 mL of Omnipaque 350. Multiplanar reconstructions performed.  3D reconstructions were performed on a separate workstation.    COMPARISON:  Correlation with radiographs from the same date taken at 14:21.    FINDINGS:  CTA: The superficial femoral artery, popliteal artery, and anterior tibial, posterior tibial, and peroneal arteries are intact, without evidence of dissection, hemodynamically significant stenosis, or occlusion.  There is no evidence of contrast extravasation.    Bones: There is a mildly displaced, mildly comminuted transverse fracture through the anterior tibial metaphysis with extension to the medial and lateral tibial plateaus, consistent with a Schatzker type 6 tibial plateau fracture.  No additional fractures are seen.    Joints: There is a large right knee lipohemarthrosis.    Soft tissues: There is soft tissue edema/hemorrhage about the tibial plateau, extending inferiorly deep to the medial gastrocnemius muscle.                                X-Ray Knee 3 View Right (Final result)  Result time 08/17/20 14:36:05    Final result by Thierno Nagy MD (08/17/20 14:36:05)                 Impression:      Right knee proximal tibial acute fracture, with involvement of the tibial plateau and intra-articular extension and knee joint effusion/hemarthrosis, as above.    This  report was flagged in Epic as abnormal.      Electronically signed by: Thierno Nagy MD  Date:    08/17/2020  Time:    14:36             Narrative:    EXAMINATION:  XR KNEE 3 VIEW RIGHT    CLINICAL HISTORY:  Pain in right knee    TECHNIQUE:  AP, lateral, and Merchant views of the right knee were performed.    COMPARISON:  None    FINDINGS:  There is generalized osteopenia, advanced for age.  There is acute transverse type fracture through the tibial meta epiphyseal junction with mild impaction and slight comminution.  Fracture planes appear to extend through the tibial plateau to the articular surface at the medial compartment with mild depression.  Differential fat fluid level within the suprapatellar region consistent with hemarthrosis.  No dislocation or destructive osseous process.  Patellofemoral and lateral compartments appear maintained.  No subcutaneous emphysema or radiodense retained foreign body.                                 Medical Decision Making:   Initial Assessment:   Right knee pain after fall; obvious deformity noted   Differential Diagnosis:   Fracture, dislocation, internal derangement   Clinical Tests:   Lab Tests: Ordered and Reviewed  The following lab test(s) were unremarkable: CBC and CMP  Radiological Study: Ordered and Reviewed  ED Management:  Pt presents to ED for evaluation of right knee pain after slip and fall.  Morphine, toradol and lidoderm given in the ED.  Basic labs drawn in anticipation of potential surgical issue.  X-ray shows right knee proximal tibial acute fracture, with involvement of the tibial plateau and intra-articular extension and knee joint effusion/hemarthrosis, as above.  Dr. Nesbitt was consulted and stated that patient needed to be transferred for possible surgical intervention.                  Attending Attestation:     Physician Attestation Statement for NP/PA:   I have conducted a face to face encounter with this patient in addition to the NP/PA, due to  NP/PA Request    Other NP/PA Attestation Additions:    History of Present Illness: Patient here with right knee pain after trip and fall while walking her dog just PTA.  denies other injury.     Physical Exam: VSS, patient appears uncomfortable  + obvious deformity to right knee.  + swelling  ROM restricted secondary to pain  + diffuse TTP  Distal RUE NVI  2+ DP and PT pulses  Sensation intact  Patient can range right ankle and toes.    Medical Decision Making: XR reveals tibial plateau fx with intra-articular extension and hemarthrosis  CTA leg negative for vascular injury                      Patient Condition: The patient has been stabilized such that, within reasonable medical probability, no material deterioration of the patient's condition or the condition of the unborn child(guille) is likely to result from transfer.  Reason for Transfer: Qualified clinical personnel unavailable  Benefits of Transfer: qualified personal at receiving facility  Risks of Transfer: Deterioration of condition  Accepting Physician: [facility]   Sending Physician: dr dr nathen DUMNOT Certification: Patient examined and risks and benefits explained, Patient and/or family/representative verbalize understanding        Clinical Impression:       ICD-10-CM ICD-9-CM   1. Closed fracture of right tibial plateau, initial encounter  S82.141A 823.00   2. Right knee pain  M25.561 719.46         Disposition:   Disposition: Discharged  Condition: Stable     ED Disposition Condition    Transfer to Another Facility Stable                          Deb Carpenter PA-C  08/17/20 1749       Deb Carpenter PA-C  08/17/20 1752       Evelin Ingram MD  08/17/20 2002

## 2020-08-18 ENCOUNTER — ANESTHESIA EVENT (OUTPATIENT)
Dept: SURGERY | Facility: HOSPITAL | Age: 30
DRG: 493 | End: 2020-08-18
Payer: COMMERCIAL

## 2020-08-18 ENCOUNTER — ANESTHESIA (OUTPATIENT)
Dept: SURGERY | Facility: HOSPITAL | Age: 30
DRG: 493 | End: 2020-08-18
Payer: COMMERCIAL

## 2020-08-18 LAB
25(OH)D3+25(OH)D2 SERPL-MCNC: 12 NG/ML (ref 30–96)
BILIRUB UR QL STRIP: NEGATIVE
CLARITY UR REFRACT.AUTO: CLEAR
COLOR UR AUTO: NORMAL
GLUCOSE UR QL STRIP: NEGATIVE
HGB UR QL STRIP: NEGATIVE
KETONES UR QL STRIP: NEGATIVE
LEUKOCYTE ESTERASE UR QL STRIP: NEGATIVE
NITRITE UR QL STRIP: NEGATIVE
PH UR STRIP: 7 [PH] (ref 5–8)
PHOSPHATE SERPL-MCNC: 3 MG/DL (ref 2.7–4.5)
PROT UR QL STRIP: NEGATIVE
PTH-INTACT SERPL-MCNC: 218 PG/ML (ref 9–77)
SP GR UR STRIP: 1.03 (ref 1–1.03)
T4 FREE SERPL-MCNC: 0.89 NG/DL (ref 0.71–1.51)
TSH SERPL DL<=0.005 MIU/L-ACNC: 0.79 UIU/ML (ref 0.4–4)
URN SPEC COLLECT METH UR: NORMAL

## 2020-08-18 PROCEDURE — 64445 NJX AA&/STRD SCIATIC NRV IMG: CPT | Mod: 59,RT,, | Performed by: ANESTHESIOLOGY

## 2020-08-18 PROCEDURE — 27536 TREAT KNEE FRACTURE: CPT | Mod: RT,,, | Performed by: ORTHOPAEDIC SURGERY

## 2020-08-18 PROCEDURE — 25000003 PHARM REV CODE 250: Performed by: STUDENT IN AN ORGANIZED HEALTH CARE EDUCATION/TRAINING PROGRAM

## 2020-08-18 PROCEDURE — 64448 NJX AA&/STRD FEM NRV NFS IMG: CPT | Performed by: STUDENT IN AN ORGANIZED HEALTH CARE EDUCATION/TRAINING PROGRAM

## 2020-08-18 PROCEDURE — C1751 CATH, INF, PER/CENT/MIDLINE: HCPCS | Performed by: ANESTHESIOLOGY

## 2020-08-18 PROCEDURE — 94761 N-INVAS EAR/PLS OXIMETRY MLT: CPT

## 2020-08-18 PROCEDURE — 27200750 HC INSULATED NEEDLE/ STIMUPLEX: Performed by: ANESTHESIOLOGY

## 2020-08-18 PROCEDURE — 63600175 PHARM REV CODE 636 W HCPCS: Performed by: STUDENT IN AN ORGANIZED HEALTH CARE EDUCATION/TRAINING PROGRAM

## 2020-08-18 PROCEDURE — 76942 RIGHT POPLITEAL SCIATIC SINGLE INJECTION BLOCK: ICD-10-PCS | Mod: 26,,, | Performed by: ANESTHESIOLOGY

## 2020-08-18 PROCEDURE — 76942 ECHO GUIDE FOR BIOPSY: CPT | Mod: 26,,, | Performed by: ANESTHESIOLOGY

## 2020-08-18 PROCEDURE — 37000009 HC ANESTHESIA EA ADD 15 MINS: Performed by: ORTHOPAEDIC SURGERY

## 2020-08-18 PROCEDURE — 99223 1ST HOSP IP/OBS HIGH 75: CPT | Mod: 57,,, | Performed by: ORTHOPAEDIC SURGERY

## 2020-08-18 PROCEDURE — 36000710: Performed by: ORTHOPAEDIC SURGERY

## 2020-08-18 PROCEDURE — 64448 NJX AA&/STRD FEM NRV NFS IMG: CPT | Mod: 59,RT,, | Performed by: ANESTHESIOLOGY

## 2020-08-18 PROCEDURE — 84100 ASSAY OF PHOSPHORUS: CPT

## 2020-08-18 PROCEDURE — 27536 PR OPEN RX BILAT TIB PLAT FX: ICD-10-PCS | Mod: RT,,, | Performed by: ORTHOPAEDIC SURGERY

## 2020-08-18 PROCEDURE — 36000711: Performed by: ORTHOPAEDIC SURGERY

## 2020-08-18 PROCEDURE — 76942 ECHO GUIDE FOR BIOPSY: CPT | Performed by: STUDENT IN AN ORGANIZED HEALTH CARE EDUCATION/TRAINING PROGRAM

## 2020-08-18 PROCEDURE — 11000001 HC ACUTE MED/SURG PRIVATE ROOM

## 2020-08-18 PROCEDURE — D9220A PRA ANESTHESIA: ICD-10-PCS | Mod: ,,, | Performed by: ANESTHESIOLOGY

## 2020-08-18 PROCEDURE — 71000015 HC POSTOP RECOV 1ST HR: Performed by: ORTHOPAEDIC SURGERY

## 2020-08-18 PROCEDURE — 71000033 HC RECOVERY, INTIAL HOUR: Performed by: ORTHOPAEDIC SURGERY

## 2020-08-18 PROCEDURE — 64445 NJX AA&/STRD SCIATIC NRV IMG: CPT | Performed by: STUDENT IN AN ORGANIZED HEALTH CARE EDUCATION/TRAINING PROGRAM

## 2020-08-18 PROCEDURE — 71000039 HC RECOVERY, EACH ADD'L HOUR: Performed by: ORTHOPAEDIC SURGERY

## 2020-08-18 PROCEDURE — 64448 RIGHT ADDUCTOR CANAL CATHETER: ICD-10-PCS | Mod: 59,RT,, | Performed by: ANESTHESIOLOGY

## 2020-08-18 PROCEDURE — 64445 RIGHT POPLITEAL SCIATIC SINGLE INJECTION BLOCK: ICD-10-PCS | Mod: 59,RT,, | Performed by: ANESTHESIOLOGY

## 2020-08-18 PROCEDURE — 84443 ASSAY THYROID STIM HORMONE: CPT

## 2020-08-18 PROCEDURE — 63600175 PHARM REV CODE 636 W HCPCS: Performed by: ORTHOPAEDIC SURGERY

## 2020-08-18 PROCEDURE — 99223 PR INITIAL HOSPITAL CARE,LEVL III: ICD-10-PCS | Mod: 57,,, | Performed by: ORTHOPAEDIC SURGERY

## 2020-08-18 PROCEDURE — C1713 ANCHOR/SCREW BN/BN,TIS/BN: HCPCS | Performed by: ORTHOPAEDIC SURGERY

## 2020-08-18 PROCEDURE — 83970 ASSAY OF PARATHORMONE: CPT

## 2020-08-18 PROCEDURE — 25000003 PHARM REV CODE 250: Performed by: ORTHOPAEDIC SURGERY

## 2020-08-18 PROCEDURE — 27000221 HC OXYGEN, UP TO 24 HOURS

## 2020-08-18 PROCEDURE — 27201423 OPTIME MED/SURG SUP & DEVICES STERILE SUPPLY: Performed by: ORTHOPAEDIC SURGERY

## 2020-08-18 PROCEDURE — D9220A PRA ANESTHESIA: Mod: ,,, | Performed by: ANESTHESIOLOGY

## 2020-08-18 PROCEDURE — 63600175 PHARM REV CODE 636 W HCPCS

## 2020-08-18 PROCEDURE — 84439 ASSAY OF FREE THYROXINE: CPT

## 2020-08-18 PROCEDURE — 81003 URINALYSIS AUTO W/O SCOPE: CPT

## 2020-08-18 PROCEDURE — 37000008 HC ANESTHESIA 1ST 15 MINUTES: Performed by: ORTHOPAEDIC SURGERY

## 2020-08-18 PROCEDURE — 82306 VITAMIN D 25 HYDROXY: CPT

## 2020-08-18 PROCEDURE — 25000242 PHARM REV CODE 250 ALT 637 W/ HCPCS: Performed by: STUDENT IN AN ORGANIZED HEALTH CARE EDUCATION/TRAINING PROGRAM

## 2020-08-18 DEVICE — SCREW CORTEX 3.5 X 24: Type: IMPLANTABLE DEVICE | Site: LEG | Status: FUNCTIONAL

## 2020-08-18 DEVICE — SCREW LOCKING 3.5MM X 45MM: Type: IMPLANTABLE DEVICE | Site: LEG | Status: FUNCTIONAL

## 2020-08-18 DEVICE — SCREW LOCKING 3.5MM/60MM RECES: Type: IMPLANTABLE DEVICE | Site: LEG | Status: FUNCTIONAL

## 2020-08-18 DEVICE — SCREW LOCKING 3.5MM X 40MM: Type: IMPLANTABLE DEVICE | Site: LEG | Status: FUNCTIONAL

## 2020-08-18 DEVICE — SCREW LOCKING 3.5MM X 38MM: Type: IMPLANTABLE DEVICE | Site: LEG | Status: FUNCTIONAL

## 2020-08-18 DEVICE — IMPLANTABLE DEVICE: Type: IMPLANTABLE DEVICE | Site: LEG | Status: FUNCTIONAL

## 2020-08-18 DEVICE — SCREW CORTEX 3.5 X 30: Type: IMPLANTABLE DEVICE | Site: LEG | Status: FUNCTIONAL

## 2020-08-18 RX ORDER — SODIUM CHLORIDE 0.9 % (FLUSH) 0.9 %
10 SYRINGE (ML) INJECTION
Status: DISCONTINUED | OUTPATIENT
Start: 2020-08-18 | End: 2020-08-18 | Stop reason: HOSPADM

## 2020-08-18 RX ORDER — SUCCINYLCHOLINE CHLORIDE 20 MG/ML
INJECTION INTRAMUSCULAR; INTRAVENOUS
Status: DISCONTINUED | OUTPATIENT
Start: 2020-08-18 | End: 2020-08-18

## 2020-08-18 RX ORDER — CLINDAMYCIN PHOSPHATE 600 MG/50ML
600 INJECTION, SOLUTION INTRAVENOUS
Status: DISPENSED | OUTPATIENT
Start: 2020-08-18 | End: 2020-08-19

## 2020-08-18 RX ORDER — CEFAZOLIN SODIUM 1 G/3ML
2 INJECTION, POWDER, FOR SOLUTION INTRAMUSCULAR; INTRAVENOUS
Status: DISPENSED | OUTPATIENT
Start: 2020-08-18 | End: 2020-08-19

## 2020-08-18 RX ORDER — VANCOMYCIN HYDROCHLORIDE 1 G/20ML
INJECTION, POWDER, LYOPHILIZED, FOR SOLUTION INTRAVENOUS
Status: DISCONTINUED | OUTPATIENT
Start: 2020-08-18 | End: 2020-08-18 | Stop reason: HOSPADM

## 2020-08-18 RX ORDER — ROPIVACAINE HYDROCHLORIDE 5 MG/ML
INJECTION, SOLUTION EPIDURAL; INFILTRATION; PERINEURAL
Status: DISCONTINUED | OUTPATIENT
Start: 2020-08-18 | End: 2020-08-18

## 2020-08-18 RX ORDER — BACITRACIN ZINC 500 UNIT/G
OINTMENT (GRAM) TOPICAL
Status: DISCONTINUED | OUTPATIENT
Start: 2020-08-18 | End: 2020-08-18 | Stop reason: HOSPADM

## 2020-08-18 RX ORDER — ALBUTEROL SULFATE 90 UG/1
AEROSOL, METERED RESPIRATORY (INHALATION)
Status: DISCONTINUED | OUTPATIENT
Start: 2020-08-18 | End: 2020-08-18

## 2020-08-18 RX ORDER — MIDAZOLAM HYDROCHLORIDE 1 MG/ML
INJECTION, SOLUTION INTRAMUSCULAR; INTRAVENOUS
Status: DISCONTINUED | OUTPATIENT
Start: 2020-08-18 | End: 2020-08-18

## 2020-08-18 RX ORDER — ROCURONIUM BROMIDE 10 MG/ML
INJECTION, SOLUTION INTRAVENOUS
Status: DISCONTINUED | OUTPATIENT
Start: 2020-08-18 | End: 2020-08-18

## 2020-08-18 RX ORDER — HYDROMORPHONE HYDROCHLORIDE 1 MG/ML
INJECTION, SOLUTION INTRAMUSCULAR; INTRAVENOUS; SUBCUTANEOUS
Status: COMPLETED
Start: 2020-08-18 | End: 2020-08-18

## 2020-08-18 RX ORDER — HYDROMORPHONE HYDROCHLORIDE 1 MG/ML
0.2 INJECTION, SOLUTION INTRAMUSCULAR; INTRAVENOUS; SUBCUTANEOUS EVERY 5 MIN PRN
Status: COMPLETED | OUTPATIENT
Start: 2020-08-18 | End: 2020-08-18

## 2020-08-18 RX ORDER — DEXAMETHASONE SODIUM PHOSPHATE 4 MG/ML
INJECTION, SOLUTION INTRA-ARTICULAR; INTRALESIONAL; INTRAMUSCULAR; INTRAVENOUS; SOFT TISSUE
Status: DISCONTINUED | OUTPATIENT
Start: 2020-08-18 | End: 2020-08-18

## 2020-08-18 RX ORDER — ROPIVACAINE HYDROCHLORIDE 2 MG/ML
8 INJECTION, SOLUTION EPIDURAL; INFILTRATION; PERINEURAL CONTINUOUS
Status: DISCONTINUED | OUTPATIENT
Start: 2020-08-18 | End: 2020-08-21 | Stop reason: HOSPADM

## 2020-08-18 RX ORDER — ONDANSETRON 2 MG/ML
INJECTION INTRAMUSCULAR; INTRAVENOUS
Status: DISCONTINUED | OUTPATIENT
Start: 2020-08-18 | End: 2020-08-18

## 2020-08-18 RX ORDER — NAPROXEN SODIUM 220 MG/1
81 TABLET, FILM COATED ORAL 2 TIMES DAILY
Status: DISCONTINUED | OUTPATIENT
Start: 2020-08-19 | End: 2020-08-21 | Stop reason: HOSPADM

## 2020-08-18 RX ORDER — CLINDAMYCIN PHOSPHATE 900 MG/50ML
INJECTION, SOLUTION INTRAVENOUS
Status: DISCONTINUED | OUTPATIENT
Start: 2020-08-18 | End: 2020-08-18

## 2020-08-18 RX ORDER — CALCIUM CARBONATE 500(1250)
500 TABLET ORAL 2 TIMES DAILY
Status: DISCONTINUED | OUTPATIENT
Start: 2020-08-18 | End: 2020-08-21 | Stop reason: HOSPADM

## 2020-08-18 RX ORDER — DOCUSATE SODIUM 100 MG/1
100 CAPSULE, LIQUID FILLED ORAL 2 TIMES DAILY
Status: DISCONTINUED | OUTPATIENT
Start: 2020-08-18 | End: 2020-08-21 | Stop reason: HOSPADM

## 2020-08-18 RX ORDER — LIDOCAINE HCL/PF 100 MG/5ML
SYRINGE (ML) INTRAVENOUS
Status: DISCONTINUED | OUTPATIENT
Start: 2020-08-18 | End: 2020-08-18

## 2020-08-18 RX ORDER — PHENYLEPHRINE HYDROCHLORIDE 10 MG/ML
INJECTION INTRAVENOUS
Status: DISCONTINUED | OUTPATIENT
Start: 2020-08-18 | End: 2020-08-18

## 2020-08-18 RX ORDER — MUPIROCIN 20 MG/G
OINTMENT TOPICAL
Status: DISCONTINUED | OUTPATIENT
Start: 2020-08-18 | End: 2020-08-18 | Stop reason: HOSPADM

## 2020-08-18 RX ORDER — CHOLECALCIFEROL (VITAMIN D3) 25 MCG
1000 TABLET ORAL DAILY
Status: DISCONTINUED | OUTPATIENT
Start: 2020-08-18 | End: 2020-08-21 | Stop reason: HOSPADM

## 2020-08-18 RX ORDER — PROPOFOL 10 MG/ML
VIAL (ML) INTRAVENOUS
Status: DISCONTINUED | OUTPATIENT
Start: 2020-08-18 | End: 2020-08-18

## 2020-08-18 RX ORDER — DIPHENHYDRAMINE HCL 25 MG
25 CAPSULE ORAL EVERY 6 HOURS PRN
Status: DISCONTINUED | OUTPATIENT
Start: 2020-08-18 | End: 2020-08-21 | Stop reason: HOSPADM

## 2020-08-18 RX ORDER — CEFAZOLIN SODIUM 1 G/3ML
2 INJECTION, POWDER, FOR SOLUTION INTRAMUSCULAR; INTRAVENOUS
Status: COMPLETED | OUTPATIENT
Start: 2020-08-18 | End: 2020-08-18

## 2020-08-18 RX ORDER — FENTANYL CITRATE 50 UG/ML
INJECTION, SOLUTION INTRAMUSCULAR; INTRAVENOUS
Status: DISCONTINUED | OUTPATIENT
Start: 2020-08-18 | End: 2020-08-18

## 2020-08-18 RX ADMIN — ROCURONIUM BROMIDE 5 MG: 10 INJECTION, SOLUTION INTRAVENOUS at 11:08

## 2020-08-18 RX ADMIN — PHENYLEPHRINE HYDROCHLORIDE 100 MCG: 10 INJECTION INTRAVENOUS at 11:08

## 2020-08-18 RX ADMIN — SODIUM CHLORIDE, SODIUM GLUCONATE, SODIUM ACETATE, POTASSIUM CHLORIDE, MAGNESIUM CHLORIDE, SODIUM PHOSPHATE, DIBASIC, AND POTASSIUM PHOSPHATE: .53; .5; .37; .037; .03; .012; .00082 INJECTION, SOLUTION INTRAVENOUS at 12:08

## 2020-08-18 RX ADMIN — ROCURONIUM BROMIDE 10 MG: 10 INJECTION, SOLUTION INTRAVENOUS at 12:08

## 2020-08-18 RX ADMIN — PHENYLEPHRINE HYDROCHLORIDE 200 MCG: 10 INJECTION INTRAVENOUS at 12:08

## 2020-08-18 RX ADMIN — DEXAMETHASONE SODIUM PHOSPHATE 4 MG: 4 INJECTION, SOLUTION INTRAMUSCULAR; INTRAVENOUS at 11:08

## 2020-08-18 RX ADMIN — PHENYLEPHRINE HYDROCHLORIDE 200 MCG: 10 INJECTION INTRAVENOUS at 01:08

## 2020-08-18 RX ADMIN — HYDROMORPHONE HYDROCHLORIDE 0.2 MG: 1 INJECTION, SOLUTION INTRAMUSCULAR; INTRAVENOUS; SUBCUTANEOUS at 02:08

## 2020-08-18 RX ADMIN — CALCIUM 500 MG: 500 TABLET ORAL at 08:08

## 2020-08-18 RX ADMIN — DIPHENHYDRAMINE HYDROCHLORIDE 25 MG: 25 CAPSULE ORAL at 11:08

## 2020-08-18 RX ADMIN — HYDROMORPHONE HYDROCHLORIDE 0.2 MG: 1 INJECTION, SOLUTION INTRAMUSCULAR; INTRAVENOUS; SUBCUTANEOUS at 03:08

## 2020-08-18 RX ADMIN — PROPOFOL 160 MG: 10 INJECTION, EMULSION INTRAVENOUS at 11:08

## 2020-08-18 RX ADMIN — ONDANSETRON 8 MG: 8 TABLET, ORALLY DISINTEGRATING ORAL at 05:08

## 2020-08-18 RX ADMIN — PROMETHAZINE HYDROCHLORIDE 6.25 MG: 25 INJECTION INTRAMUSCULAR; INTRAVENOUS at 09:08

## 2020-08-18 RX ADMIN — SODIUM CHLORIDE: 0.9 INJECTION, SOLUTION INTRAVENOUS at 01:08

## 2020-08-18 RX ADMIN — ONDANSETRON 4 MG: 2 INJECTION, SOLUTION INTRAMUSCULAR; INTRAVENOUS at 12:08

## 2020-08-18 RX ADMIN — CLINDAMYCIN IN 5 PERCENT DEXTROSE 600 MG: 12 INJECTION, SOLUTION INTRAVENOUS at 08:08

## 2020-08-18 RX ADMIN — ALBUTEROL SULFATE 5 PUFF: 90 AEROSOL, METERED RESPIRATORY (INHALATION) at 11:08

## 2020-08-18 RX ADMIN — ROPIVACAINE HYDROCHLORIDE 25 MG: 5 INJECTION, SOLUTION EPIDURAL; INFILTRATION; PERINEURAL at 02:08

## 2020-08-18 RX ADMIN — OXYCODONE HYDROCHLORIDE 10 MG: 10 TABLET ORAL at 07:08

## 2020-08-18 RX ADMIN — CEFAZOLIN 2 G: 1 INJECTION, POWDER, FOR SOLUTION INTRAMUSCULAR; INTRAVENOUS at 08:08

## 2020-08-18 RX ADMIN — ONDANSETRON 8 MG: 8 TABLET, ORALLY DISINTEGRATING ORAL at 08:08

## 2020-08-18 RX ADMIN — SUGAMMADEX 200 MG: 100 INJECTION, SOLUTION INTRAVENOUS at 01:08

## 2020-08-18 RX ADMIN — OXYCODONE HYDROCHLORIDE 10 MG: 10 TABLET ORAL at 02:08

## 2020-08-18 RX ADMIN — LIDOCAINE HYDROCHLORIDE 80 MG: 20 INJECTION, SOLUTION INTRAVENOUS at 11:08

## 2020-08-18 RX ADMIN — MIDAZOLAM HYDROCHLORIDE 2 MG: 1 INJECTION, SOLUTION INTRAMUSCULAR; INTRAVENOUS at 11:08

## 2020-08-18 RX ADMIN — FENTANYL CITRATE 25 MCG: 50 INJECTION, SOLUTION INTRAMUSCULAR; INTRAVENOUS at 11:08

## 2020-08-18 RX ADMIN — VITAMIN D, TAB 1000IU (100/BT) 1000 UNITS: 25 TAB at 05:08

## 2020-08-18 RX ADMIN — FENTANYL CITRATE 25 MCG: 50 INJECTION, SOLUTION INTRAMUSCULAR; INTRAVENOUS at 12:08

## 2020-08-18 RX ADMIN — SODIUM CHLORIDE, SODIUM GLUCONATE, SODIUM ACETATE, POTASSIUM CHLORIDE, MAGNESIUM CHLORIDE, SODIUM PHOSPHATE, DIBASIC, AND POTASSIUM PHOSPHATE: .53; .5; .37; .037; .03; .012; .00082 INJECTION, SOLUTION INTRAVENOUS at 11:08

## 2020-08-18 RX ADMIN — DOCUSATE SODIUM 100 MG: 100 CAPSULE, LIQUID FILLED ORAL at 08:08

## 2020-08-18 RX ADMIN — SUCCINYLCHOLINE CHLORIDE 140 MG: 20 INJECTION, SOLUTION INTRAMUSCULAR; INTRAVENOUS at 11:08

## 2020-08-18 RX ADMIN — OXYCODONE HYDROCHLORIDE 5 MG: 5 TABLET ORAL at 05:08

## 2020-08-18 RX ADMIN — ROCURONIUM BROMIDE 35 MG: 10 INJECTION, SOLUTION INTRAVENOUS at 11:08

## 2020-08-18 RX ADMIN — ROPIVACAINE HYDROCHLORIDE 75 MG: 5 INJECTION, SOLUTION EPIDURAL; INFILTRATION; PERINEURAL at 02:08

## 2020-08-18 RX ADMIN — ROPIVACAINE HYDROCHLORIDE 8 ML/HR: 2 INJECTION, SOLUTION EPIDURAL; INFILTRATION at 03:08

## 2020-08-18 RX ADMIN — FENTANYL CITRATE 50 MCG: 50 INJECTION, SOLUTION INTRAMUSCULAR; INTRAVENOUS at 11:08

## 2020-08-18 RX ADMIN — CEFAZOLIN 2 G: 330 INJECTION, POWDER, FOR SOLUTION INTRAMUSCULAR; INTRAVENOUS at 11:08

## 2020-08-18 RX ADMIN — CLINDAMYCIN PHOSPHATE 900 MG: 18 INJECTION, SOLUTION INTRAVENOUS at 11:08

## 2020-08-18 NOTE — ANESTHESIA PROCEDURE NOTES
Right Popliteal Sciatic Single Injection Block    Patient location during procedure: pre-op   Block not for primary anesthetic.  Reason for block: at surgeon's request and post-op pain management   Post-op Pain Location: right lower extremity pain  Start time: 8/18/2020 2:31 PM  Timeout: 8/18/2020 2:30 PM   End time: 8/18/2020 2:50 PM    Staffing  Authorizing Provider: Abhishek Don MD  Performing Provider: Umer Smith MD    Preanesthetic Checklist  Completed: patient identified, site marked, surgical consent, pre-op evaluation, timeout performed, IV checked, risks and benefits discussed and monitors and equipment checked  Peripheral Block  Patient position: supine  Prep: ChloraPrep  Patient monitoring: heart rate, cardiac monitor, continuous pulse ox, continuous capnometry and frequent blood pressure checks  Block type: popliteal  Laterality: right  Injection technique: single shot  Needle  Needle type: Stimuplex   Needle gauge: 21 G  Needle length: 4 in  Needle localization: anatomical landmarks and ultrasound guidance   -ultrasound image captured on disc.  Assessment  Injection assessment: negative aspiration, negative parasthesia and local visualized surrounding nerve  Paresthesia pain: none  Heart rate change: no  Slow fractionated injection: yes  Additional Notes  VSS.  PACU RN monitoring vitals throughout procedure.  Patient tolerated procedure well. Enhancers added to LA.

## 2020-08-18 NOTE — ANESTHESIA PROCEDURE NOTES
Right Adductor Canal Catheter    Patient location during procedure: pre-op   Block not for primary anesthetic.  Reason for block: at surgeon's request and post-op pain management   Post-op Pain Location: right lower extremity pain  Start time: 8/18/2020 2:31 PM  Timeout: 8/18/2020 2:30 PM   End time: 8/18/2020 2:50 PM    Staffing  Authorizing Provider: Abhishek Don MD  Performing Provider: Umer Smith MD    Preanesthetic Checklist  Completed: patient identified, site marked, surgical consent, pre-op evaluation, timeout performed, IV checked, risks and benefits discussed and monitors and equipment checked  Peripheral Block  Patient position: supine  Prep: ChloraPrep and site prepped and draped  Patient monitoring: heart rate, cardiac monitor, continuous pulse ox, continuous capnometry and frequent blood pressure checks  Block type: adductor canal  Laterality: right  Injection technique: continuous  Needle  Needle type: Tuohy   Needle gauge: 17 G  Needle length: 3.5 in  Needle localization: anatomical landmarks and ultrasound guidance  Catheter type: spring wound  Catheter size: 19 G  Test dose: lidocaine 1.5% with Epi 1-to-200,000 and negative   -ultrasound image captured on disc.  Assessment  Injection assessment: negative aspiration, negative parasthesia and local visualized surrounding nerve  Paresthesia pain: none  Heart rate change: no  Slow fractionated injection: yes  Additional Notes  VSS.  PACU RN monitoring vitals throughout procedure.  Patient tolerated procedure well. Enhancers added to LA.

## 2020-08-18 NOTE — ED TRIAGE NOTES
Pt transfer from [place] for right tibial plateau fracture. Pt states walking her dog when she tripped over flip flop and fell to ground. Pt unable to flex and extend right foot at this time. Pt states being able to move foot at previous hospital. Pt with lidocaine patch on knee and icing leg at this time.

## 2020-08-18 NOTE — SUBJECTIVE & OBJECTIVE
Past Medical History:   Diagnosis Date    Hypopituitarism     Osteoporosis     Primary amenorrhea        Past Surgical History:   Procedure Laterality Date    breast aug Bilateral 2012       Review of patient's allergies indicates:  No Known Allergies    Current Facility-Administered Medications   Medication    0.9%  NaCl infusion    acetaminophen tablet 650 mg    acetaminophen tablet 650 mg    lidocaine (PF) 10 mg/ml (1%) injection 10 mg    melatonin tablet 6 mg    ondansetron disintegrating tablet 8 mg    oxyCODONE immediate release tablet 10 mg    oxyCODONE immediate release tablet 5 mg    promethazine (PHENERGAN) 6.25 mg in dextrose 5 % 50 mL IVPB    sodium chloride 0.9% flush 10 mL    sodium chloride 0.9% flush 10 mL     Current Outpatient Medications   Medication Sig    estradiol (ESTRACE) 0.01 % (0.1 mg/gram) vaginal cream Place pea-sized amount vaginally with finger every night for 3 weeks, then 2-3 times per week.    estradiol (ESTRACE) 0.5 MG tablet Take 1 tablet (0.5 mg total) by mouth once daily.    famciclovir (FAMVIR ORAL) Take 500 mg by mouth.    ibuprofen (ADVIL,MOTRIN) 800 MG tablet Take 1 tablet (800 mg total) by mouth every 6 (six) hours as needed for Pain. (Patient not taking: Reported on 12/13/2019)    medroxyPROGESTERone (PROVERA) 10 MG tablet Take 0.5 tablets (5 mg total) by mouth once daily.     Family History     Problem Relation (Age of Onset)    Cancer Maternal Grandfather, Cousin    Short stature Maternal Grandmother        Tobacco Use    Smoking status: Never Smoker    Smokeless tobacco: Never Used   Substance and Sexual Activity    Alcohol use: Yes     Comment: occ    Drug use: No    Sexual activity: Yes     Partners: Male     ROS   Per ER 08/18/2020  Objective:     Vital Signs (Most Recent):  Temp: 98.2 °F (36.8 °C) (08/17/20 1947)  Pulse: 80 (08/17/20 2301)  Resp: 18 (08/17/20 2036)  BP: 105/70 (08/17/20 2301)  SpO2: 98 % (08/17/20 2301) Vital Signs (24h  "Range):  Temp:  [98.2 °F (36.8 °C)-98.6 °F (37 °C)] 98.2 °F (36.8 °C)  Pulse:  [67-96] 80  Resp:  [16-18] 18  SpO2:  [97 %-100 %] 98 %  BP: (105-145)/(57-97) 105/70     Weight: 49.9 kg (110 lb)  Height: 4' 11" (149.9 cm)  Body mass index is 22.22 kg/m².    No intake or output data in the 24 hours ending 08/18/20 0019    Ortho/SPM Exam   Gen:  No acute distress  CV:  Peripherally well-perfused.    Lungs:  Normal respiratory effort.  Head/Neck:  Normocephalic.  Atraumatic.    MSK:  RLE:  - Skin intact throughout, no open wounds  - mild swelling over proximal tibia that is tender to palpation  - No ecchymosis, erythema, or signs of cellulitis  - Painful during ankle dorsi and plantar flexion, but fires TA and gastroc. Fires quad and hamstring, but all limited 2/2 to pain.  - EHL/FHL full and intact.   - SILT throughout  - Compartments soft and compressible  - No pain to passive stretch  - DP and PT palpated  - Capillary Refill <3s  - Negative Log roll  - Negative Stinchfield    All other joints (shoulder/elbow/wrist/hip/knee/ankle) were examined and had full ROM and were non-tender to palpation.    Spine/pelvis/axial body:  No tenderness to palpation of cervical, thoracic, or lumbar spine  No pain with compression of pelvis  No chest wall or abdominal tenderness  No decubitus ulcers    Significant Labs:   A1C:   Recent Labs   Lab 08/17/20  2034   HGBA1C 5.0     CBC:   Recent Labs   Lab 08/17/20  1342   WBC 12.84*   HGB 13.1   HCT 39.2        CMP:   Recent Labs   Lab 08/17/20  1342      K 3.8      CO2 18*      BUN 10   CREATININE 0.9   CALCIUM 9.0   PROT 7.3   ALBUMIN 3.9   BILITOT 0.3   ALKPHOS 76   AST 26   ALT 16   ANIONGAP 12   EGFRNONAA >60     Coagulation:   Recent Labs   Lab 08/17/20  2245   LABPROT 11.3   INR 1.0   APTT 21.7     POCT Glucose: No results for input(s): POCTGLUCOSE in the last 48 hours.  Prealbumin:   Recent Labs   Lab 08/17/20 2034   PREALBUMIN 25     All pertinent labs " within the past 24 hours have been reviewed.    Significant Imaging: I have reviewed all pertinent imaging results/findings.   X-ray right femur, knee, tib/fib with

## 2020-08-18 NOTE — ED PROVIDER NOTES
Encounter Date: 8/17/2020       History     Chief Complaint   Patient presents with    Transfer     From [place]. Right tibial plateau fracture. For Ortho     HPI   Patient is a 30-year-old female past medical history of hypopituitarism, osteoporosis, primary amenorrhea presents to the ED as transfer from outside hospital after a trip and fall on uneven pavement while walking her dog.  Patient was seen at outside ED and noted to have a tibial plateau fracture.  Patient was subsequently transferred to [facility] ED for orthopedic consult for possible surgical intervention.  At this time patient complains of 10/10 pain with difficulty performing plantar flexion but is able to wiggle her toes.  Review of patient's allergies indicates:  No Known Allergies  Past Medical History:   Diagnosis Date    Hypopituitarism     Osteoporosis     Primary amenorrhea      Past Surgical History:   Procedure Laterality Date    breast aug Bilateral 2012     Family History   Problem Relation Age of Onset    Cancer Maternal Grandfather         unknown    Cancer Cousin         brain    Short stature Maternal Grandmother         4.11`     Social History     Tobacco Use    Smoking status: Never Smoker    Smokeless tobacco: Never Used   Substance Use Topics    Alcohol use: Yes     Comment: occ    Drug use: No     Review of Systems   Constitutional: Negative for fever.   HENT: Negative for sore throat.    Respiratory: Negative for shortness of breath.    Cardiovascular: Negative for chest pain.   Gastrointestinal: Negative for nausea.   Genitourinary: Negative for dysuria.   Musculoskeletal: Positive for arthralgias and joint swelling. Negative for back pain.   Skin: Negative for rash.   Neurological: Negative for weakness.   Hematological: Does not bruise/bleed easily.       Physical Exam     Initial Vitals [08/17/20 1947]   BP Pulse Resp Temp SpO2   134/74 78 16 98.2 °F (36.8 °C) 98 %      MAP       --         Physical  Exam    Nursing note and vitals reviewed.  Constitutional: She appears well-developed and well-nourished. She is not diaphoretic. She is active.  Non-toxic appearance. She does not appear ill. No distress.   HENT:   Head: Normocephalic and atraumatic.   Eyes: Conjunctivae are normal. Pupils are equal, round, and reactive to light. Right conjunctiva is not injected. Left conjunctiva is not injected.   Neck: Trachea normal, normal range of motion and full passive range of motion without pain. Neck supple. No neck rigidity.   Cardiovascular: Normal rate, regular rhythm, S1 normal, S2 normal, intact distal pulses and normal pulses.   Abdominal: Soft. Normal appearance and bowel sounds are normal. There is no abdominal tenderness.   Musculoskeletal: Tenderness and edema present.      Right shoulder: She exhibits normal range of motion and no tenderness.      Comments: Knee swelling noted to the right knee, tenderness on palpation.  2+ DP pulses bilaterally.  Compartments are otherwise soft.  Patient endorses difficulty with plantar and dorsiflexion of the right foot however is able to wiggle her toes without difficulty.   Neurological: She is alert and oriented to person, place, and time.   Skin: Skin is warm and dry. Capillary refill takes less than 2 seconds.         ED Course   Procedures  Labs Reviewed - No data to display       Imaging Results    None          Medical Decision Making:   Initial Assessment:   Patient is a 30-year-old female past medical history of hypopituitarism, osteoporosis, primary amenorrhea presents to the ED as transfer from outside hospital after a trip and fall on uneven pavement while walking her dog.  Vitals on arrival stable.  Physical exam noted swelling to right knee, 2+ DP pulses in the right leg.  Pain to palpation overlying the right knee.  Patient able to wiggle her toes have endorses difficulty with flexion and dorsiflexion of the right foot.  Compartments are soft.  Patient given  pain control on arrival.  Orthopedics was consulted.  They have requested additional x-rays.  They will admit the patient for operative fixation in the morning.  Patient is to be made NPO.  Will monitor in to patient admitted to floor for preop planning.    Dhruv Razo M.D.  [facility]  PGY-4                Attending Attestation:   Physician Attestation Statement for Resident:  As the supervising MD   Physician Attestation Statement: I have personally seen and examined this patient.   I agree with the above history. -:   As the supervising MD I agree with the above PE.    As the supervising MD I agree with the above treatment, course, plan, and disposition.   -:   30-year-old female with tibial plateau fracture transferred to our facility for orthopedic surgery intervention.  She is neurovascularly intact.  Pain is controlled and she is declining additional pain medication.  Admitted to Orthopedic surgery, further management per their service.  I have reviewed the following: records from a referring facility.                                  Clinical Impression:       ICD-10-CM ICD-9-CM   1. Closed fracture of right tibial plateau, initial encounter  S82.141A 823.00   2. Preop cardiovascular exam  Z01.810 V72.81                                Dhruv Razo MD  Resident  08/17/20 2125       Radha Lopez MD  08/18/20 0000

## 2020-08-18 NOTE — PLAN OF CARE
CM completed assessment per medical records. Pt off floor for surgery , CM attempted to call emergency contact, no answer. Will cont to follow     08/18/20 1213   Discharge Assessment   Assessment Type Discharge Planning Assessment   Confirmed/corrected address and phone number on facesheet? Yes   Assessment information obtained from? Medical Record   Expected Length of Stay (days) 4   Communicated expected length of stay with patient/caregiver yes   Prior to hospitilization cognitive status: Alert/Oriented   Prior to hospitalization functional status: Independent   Current cognitive status: Alert/Oriented   Current Functional Status:   (unable to assess)   Facility Arrived From: home   Lives With alone   Able to Return to Prior Arrangements yes   Is patient able to care for self after discharge? Unable to determine at this time (comments)   Who are your caregiver(s) and their phone number(s)? Sahara Montoya ([phone])   Patient's perception of discharge disposition home or selfcare   Readmission Within the Last 30 Days no previous admission in last 30 days   Patient currently being followed by outpatient case management? No   Patient currently receives any other outside agency services? No   Equipment Currently Used at Home none   Do you have any problems affording any of your prescribed medications? No   Is the patient taking medications as prescribed? yes   Does the patient have transportation home? Yes   Transportation Anticipated family or friend will provide   Does the patient receive services at the [facility]? No   Discharge Plan A Home   Discharge Plan B Home with family;Home Health   DME Needed Upon Discharge  other (see comments)  (undetermined)   Patient/Family in Agreement with Plan yes   Millicent Menezes, MSN  Case Management  Ext [phone]

## 2020-08-18 NOTE — ANESTHESIA PROCEDURE NOTES
Intubation  Performed by: Clinton Cardona MD  Authorized by: Adan Blanco Jr., MD     Intubation:     Induction:  Rapid sequence induction    Intubated:  Postinduction    Mask Ventilation:  N/a    Attempts:  1    Attempted By:  Resident anesthesiologist    Method of Intubation:  Direct    Blade:  Frances 3    Laryngeal View Grade: Grade I - full view of chords      Difficult Airway Encountered?: No      Complications:  None    Airway Device Size:  7.0    Style/Cuff Inflation:  Cuffed    Inflation Amount (mL):  8    Tube secured:  22    Placement Verified By:  Capnometry    Complicating Factors:  None    Findings Post-Intubation:  BS equal bilateral

## 2020-08-18 NOTE — ASSESSMENT & PLAN NOTE
Ani Montoya is a 30 y.o. female with PMH of hypopituitarism, osteopenia, amenorrhea presenting with R tibial plateau fx. Closed, NVI. Educated patient that she will likely need operative intervention for her fracture which includes external fixator placement versus primary fixation.    --Pain control: multimodal  --Pt marked and consented, case booked. Pt understands need for definitive treatment of injuries.  --Pre-operative labs and imaging obtained in ED (UA, Type and Cross, PT-INR, CBC, BMP, PreAlbumin, CXR, EKG)   --Labs: Hb 13.1 Plt 213, Cr 0.9, , Ua wnl  --DVT PPx: hold chemical, FCDs at all times when not ambulating  --Bed rest, mitchell, NPO

## 2020-08-18 NOTE — ANESTHESIA PREPROCEDURE EVALUATION
[facility]  Anesthesia Pre-Operative Evaluation         Patient Name: Ani Montoya  YOB: 1990  MRN: [id number]    SUBJECTIVE:       Slip/fall walking on sidewalk.  No LOC or other injuries    Mild nausea in pre-op area but no emesis      Pre-operative evaluation for Procedure(s) (LRB):  APPLICATION, EXTERNAL FIXATION DEVICE, LARGE, TIBIA (Right) - SYNTHES Trios table, C-arm door side (Right)     08/18/2020    Ani Montoya is a 30 y.o. female w/ a significant PMHx of hypopituitarism, osteopenia, amenorrhea presenting with R tibial plateau fx.    Patient now presents for the above procedure(s).      LDA:        Peripheral IV - Single Lumen 08/17/20 1611 20 G Left Forearm (Active)   Number of days: 0            Peripheral IV - Single Lumen 08/17/20 1611 22 G Left Forearm (Active)   Number of days: 0            Urethral Catheter 08/18/20 0033 (Active)   Number of days: 0       Prev airway: None documented.    Drips:   sodium chloride 0.9% 125 mL/hr at 08/18/20 0123       Patient Active Problem List   Diagnosis    Primary amenorrhea    Hyperprolactinemia    Short stature    Growth hormone deficiency    Ectopic pituitary tissue    Partial agenesis of corpus callosum    Back pain    Attention deficit    Hypopituitarism    Osteoporosis    Closed fracture of right tibial plateau       Review of patient's allergies indicates:  No Known Allergies    Current Inpatient Medications:   lidocaine (PF) 10 mg/ml (1%)  1 mL Other Once       No current facility-administered medications on file prior to encounter.      Current Outpatient Medications on File Prior to Encounter   Medication Sig Dispense Refill    estradiol (ESTRACE) 0.01 % (0.1 mg/gram) vaginal cream Place pea-sized amount vaginally with finger every night for 3 weeks, then 2-3 times per week. 42.5 g 1    estradiol (ESTRACE) 0.5 MG tablet Take 1 tablet (0.5 mg total) by mouth once daily. 90 tablet 3    famciclovir  (FAMVIR ORAL) Take 500 mg by mouth.      ibuprofen (ADVIL,MOTRIN) 800 MG tablet Take 1 tablet (800 mg total) by mouth every 6 (six) hours as needed for Pain. (Patient not taking: Reported on 12/13/2019) 20 tablet 0    medroxyPROGESTERone (PROVERA) 10 MG tablet Take 0.5 tablets (5 mg total) by mouth once daily. 90 tablet 3       Past Surgical History:   Procedure Laterality Date    breast aug Bilateral 2012       Social History     Socioeconomic History    Marital status: Single     Spouse name: Not on file    Number of children: Not on file    Years of education: Not on file    Highest education level: Not on file   Occupational History    Not on file   Social Needs    Financial resource strain: Not on file    Food insecurity     Worry: Not on file     Inability: Not on file    Transportation needs     Medical: Not on file     Non-medical: Not on file   Tobacco Use    Smoking status: Never Smoker    Smokeless tobacco: Never Used   Substance and Sexual Activity    Alcohol use: Yes     Comment: occ    Drug use: No    Sexual activity: Yes     Partners: Male   Lifestyle    Physical activity     Days per week: Not on file     Minutes per session: Not on file    Stress: Not on file   Relationships    Social connections     Talks on phone: Not on file     Gets together: Not on file     Attends Mu-ism service: Not on file     Active member of club or organization: Not on file     Attends meetings of clubs or organizations: Not on file     Relationship status: Not on file   Other Topics Concern    Not on file   Social History Narrative    Not on file       OBJECTIVE:     Vital Signs Range (Last 24H):  Temp:  [36.8 °C (98.2 °F)-37 °C (98.6 °F)]   Pulse:  [67-96]   Resp:  [16-18]   BP: (105-145)/(57-97)   SpO2:  [92 %-100 %]       Significant Labs:  Lab Results   Component Value Date    WBC 12.84 (H) 08/17/2020    HGB 13.1 08/17/2020    HCT 39.2 08/17/2020     08/17/2020    CHOL 250 (H)  12/13/2019    TRIG 101 12/13/2019    HDL 71 12/13/2019    ALT 16 08/17/2020    AST 26 08/17/2020     08/17/2020    K 3.8 08/17/2020     08/17/2020    CREATININE 0.9 08/17/2020    BUN 10 08/17/2020    CO2 18 (L) 08/17/2020    TSH 1.230 12/13/2019    INR 1.0 08/17/2020    HGBA1C 5.0 08/17/2020       Diagnostic Studies: No relevant studies.    EKG:   No results found for this or any previous visit.    2D ECHO:  TTE:  No results found for this or any previous visit.    SOY:  No results found for this or any previous visit.    ASSESSMENT/PLAN:         Anesthesia Evaluation    I have reviewed the Patient Summary Reports.    I have reviewed the Nursing Notes. I have reviewed the NPO Status.   I have reviewed the Medications.     Review of Systems  Anesthesia Hx:  No previous Anesthesia  Neg history of prior surgery. Denies Family Hx of Anesthesia complications.    Social:  Non-Smoker    Hematology/Oncology:  Hematology Normal        Cardiovascular:  Cardiovascular Normal     Pulmonary:  Pulmonary Normal    Renal/:  Renal/ Normal     Hepatic/GI:  Hepatic/GI Normal    Musculoskeletal:  Bone Disorders: Osteopenia    Neurological:  Neurology Normal    Endocrine:   No longer taking growth hormone but still on estrogens. No sequelae except for gyn. Pituitary Disease, Hypopituitarism        Physical Exam  General:  Well nourished    Airway/Jaw/Neck:  Airway Findings: Mouth Opening: < 3 cm Tongue: Normal  General Airway Assessment: Adult  Mallampati: III  Improves to II with phonation.  TM Distance: Normal, at least 6 cm  Jaw/Neck Findings:  Neck ROM: Normal ROM      Dental:  Dental Findings: In tact   Chest/Lungs:  Chest/Lungs Findings: Normal Respiratory Rate     Heart/Vascular:  Heart Findings: Rate: Normal  Rhythm: Regular Rhythm        Mental Status:  Mental Status Findings:  Cooperative, Alert and Oriented         Anesthesia Plan  Type of Anesthesia, risks & benefits discussed:  Anesthesia Type:   general  Patient's Preference:   Intra-op Monitoring Plan: standard ASA monitors  Intra-op Monitoring Plan Comments:   Post Op Pain Control Plan: multimodal analgesia, IV/PO Opioids PRN, per primary service following discharge from PACU and peripheral nerve block  Post Op Pain Control Plan Comments:   Induction:   IV  Beta Blocker:  Patient is not currently on a Beta-Blocker (No further documentation required).       Informed Consent: Patient understands risks and agrees with Anesthesia plan.  Questions answered. Anesthesia consent signed with patient.  ASA Score: 2     Day of Surgery Review of History & Physical:    H&P update referred to the surgeon.         Ready For Surgery From Anesthesia Perspective.     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 She has GH deficiency as well gonadotropin deficiency.      Goal of treatment:  1. To optimize final height  2. Optimize bone mass  3. Better quality of life.  4. In future may be fertility      #1. GH deficiency      - Start her on growth hormone treatment  - We had got approval for her GH but she never received.   - Again emphasized on getting GH therapy for above mentioned goals.        #2. Gonadotropin deficiency- primary amenorrhea.     - on estradiol 0.25 mg PO daily. ( it comes as 0.5 mg tablet so take half a tablet) we will increase in dose slowly. In 3-6 months will double the dose. In 1 and half to 2 years.        Vitals - 1 value per visit 12/13/2019 3/21/2020 8/17/2020 8/17/2020   SYSTOLIC 106   145   DIASTOLIC 66   90   PULSE    92   TEMPERATURE    98.6   RESPIRATIONS  18  17   SPO2    100     Vitals - 1 value per visit 8/17/2020 8/17/2020 8/17/2020 8/18/2020   SYSTOLIC    140   DIASTOLIC    92   PULSE    74   TEMPERATURE    97.9   RESPIRATIONS    20   SPO2    98

## 2020-08-18 NOTE — ED NOTES
Patient placed on continuous cardiac monitor, automatic blood pressure cuff and continuous pulse oximeter.     Sarah Miranda, Patient Care Assistant  08/17/20 1953

## 2020-08-18 NOTE — ED NOTES
Bed rails are up and call light is within patient reach.     Sarah Miranda, Patient Care Assistant  08/17/20 1953

## 2020-08-18 NOTE — CONSULTS
[facility]  Orthopedics  Consult Note    Patient Name: Ani Montoya  MRN: [id number]  Admission Date: 8/17/2020  Hospital Length of Stay: 1 days  Attending Provider: Jordy Ortega MD  Primary Care Provider: Sylvie Mabry MD    Inpatient consult to Orthopedic Surgery  Consult performed by: Eddie Barry MD  Consult ordered by: Eddie Barry MD        Subjective:     Principal Problem:Closed fracture of right tibial plateau    Chief Complaint:   Chief Complaint   Patient presents with    Transfer     From [place]. Right tibial plateau fracture. For Ortho        HPI: Ani Montoya is a 30 y.o. female with PMH significant for hypopituitarism, osteoporosis (on hormones and has osteopenia on most recent DEXA), primary amenorrhea presenting with R knee pain. Pt endorsed her R knee giving out while stepping over curb walking her dog and landed onto R knee. Felt Immediate R knee pain prior to fall and inability to bear weight, so presented to [facility]. Xrays showed bicondylar tibial plateau fx and [place] [facility] [place]opedic surgical intervention. Patient denies any head trauma or LOC. The patient denies prior hx of falls. Patient denies numbness and tingling. Endorses difficulty performing plantar flexion. Denies any other musculoskeletal pain or injuries. No known history of prior RLE injury or surgery. Walks w/out assisted devices at baseline. Doesn't take any anticoagulation at baseline.     Past Medical History:   Diagnosis Date    Hypopituitarism     Osteoporosis     Primary amenorrhea        Past Surgical History:   Procedure Laterality Date    breast aug Bilateral 2012       Review of patient's allergies indicates:  No Known Allergies    Current Facility-Administered Medications   Medication    0.9%  NaCl infusion    acetaminophen tablet 650 mg    acetaminophen tablet 650 mg    lidocaine (PF) 10 mg/ml (1%) injection 10 mg     "melatonin tablet 6 mg    ondansetron disintegrating tablet 8 mg    oxyCODONE immediate release tablet 10 mg    oxyCODONE immediate release tablet 5 mg    promethazine (PHENERGAN) 6.25 mg in dextrose 5 % 50 mL IVPB    sodium chloride 0.9% flush 10 mL    sodium chloride 0.9% flush 10 mL     Current Outpatient Medications   Medication Sig    estradiol (ESTRACE) 0.01 % (0.1 mg/gram) vaginal cream Place pea-sized amount vaginally with finger every night for 3 weeks, then 2-3 times per week.    estradiol (ESTRACE) 0.5 MG tablet Take 1 tablet (0.5 mg total) by mouth once daily.    famciclovir (FAMVIR ORAL) Take 500 mg by mouth.    ibuprofen (ADVIL,MOTRIN) 800 MG tablet Take 1 tablet (800 mg total) by mouth every 6 (six) hours as needed for Pain. (Patient not taking: Reported on 12/13/2019)    medroxyPROGESTERone (PROVERA) 10 MG tablet Take 0.5 tablets (5 mg total) by mouth once daily.     Family History     Problem Relation (Age of Onset)    Cancer Maternal Grandfather, Cousin    Short stature Maternal Grandmother        Tobacco Use    Smoking status: Never Smoker    Smokeless tobacco: Never Used   Substance and Sexual Activity    Alcohol use: Yes     Comment: occ    Drug use: No    Sexual activity: Yes     Partners: Male     ROS   Per ER 08/18/2020  Objective:     Vital Signs (Most Recent):  Temp: 98.2 °F (36.8 °C) (08/17/20 1947)  Pulse: 80 (08/17/20 2301)  Resp: 18 (08/17/20 2036)  BP: 105/70 (08/17/20 2301)  SpO2: 98 % (08/17/20 2301) Vital Signs (24h Range):  Temp:  [98.2 °F (36.8 °C)-98.6 °F (37 °C)] 98.2 °F (36.8 °C)  Pulse:  [67-96] 80  Resp:  [16-18] 18  SpO2:  [97 %-100 %] 98 %  BP: (105-145)/(57-97) 105/70     Weight: 49.9 kg (110 lb)  Height: 4' 11" (149.9 cm)  Body mass index is 22.22 kg/m².    No intake or output data in the 24 hours ending 08/18/20 0019    Ortho/SPM Exam   Gen:  No acute distress  CV:  Peripherally well-perfused.    Lungs:  Normal respiratory effort.  Head/Neck:  " Normocephalic.  Atraumatic.    MSK:  RLE:  - Skin intact throughout, no open wounds  - mild swelling over proximal tibia that is tender to palpation  - No ecchymosis, erythema, or signs of cellulitis  - Painful during ankle dorsi and plantar flexion, but fires TA and gastroc. Fires quad and hamstring, but all limited 2/2 to pain.  - EHL/FHL full and intact.   - SILT throughout  - Compartments soft and compressible  - No pain to passive stretch  - DP and PT palpated  - Capillary Refill <3s  - Negative Log roll  - Negative Stinchfield    All other joints (shoulder/elbow/wrist/hip/knee/ankle) were examined and had full ROM and were non-tender to palpation.    Spine/pelvis/axial body:  No tenderness to palpation of cervical, thoracic, or lumbar spine  No pain with compression of pelvis  No chest wall or abdominal tenderness  No decubitus ulcers    Significant Labs:   A1C:   Recent Labs   Lab 08/17/20 2034   HGBA1C 5.0     CBC:   Recent Labs   Lab 08/17/20  1342   WBC 12.84*   HGB 13.1   HCT 39.2        CMP:   Recent Labs   Lab 08/17/20  1342      K 3.8      CO2 18*      BUN 10   CREATININE 0.9   CALCIUM 9.0   PROT 7.3   ALBUMIN 3.9   BILITOT 0.3   ALKPHOS 76   AST 26   ALT 16   ANIONGAP 12   EGFRNONAA >60     Coagulation:   Recent Labs   Lab 08/17/20  2245   LABPROT 11.3   INR 1.0   APTT 21.7     POCT Glucose: No results for input(s): POCTGLUCOSE in the last 48 hours.  Prealbumin:   Recent Labs   Lab 08/17/20 2034   PREALBUMIN 25     All pertinent labs within the past 24 hours have been reviewed.    Significant Imaging: I have reviewed all pertinent imaging results/findings.   X-ray right femur, knee, tib/fib with     Assessment/Plan:     * Closed fracture of right tibial plateau  Ani Montoya is a 30 y.o. female with PMH of hypopituitarism, osteopenia, amenorrhea presenting with R tibial plateau fx. Closed, NVI. Educated patient that she will likely need operative intervention for her  fracture which includes external fixator placement versus primary fixation.  --Admit to Orthopedic surgery  --To OR today  --Pain control: multimodal  --Pt marked and consented, case booked. Pt understands need for definitive treatment of injuries.  --Pre-operative labs and imaging obtained in ED (UA, Type and Cross, PT-INR, CBC, BMP, PreAlbumin, CXR, EKG)   --Labs: Hb 13.1 Plt 213, Cr 0.9, , Ua wnl  --DVT PPx: hold chemical, FCDs at all times when not ambulating  --Bed rest, mitchell, NPO      Risks and complications were discussed including but not limited to the risks of anesthetic complications, infection, wound healing complications, non-union, mal-union, hardware failure, pin site infection, pain, stiffness, DVT, pulmonary embolism, perioperative medical risks (cardiac, pulmonary, renal, neurologic), and death among others were discussed. No guarantees were made and the patient and family elect to proceed. All questions were answered.  No guarantees were implied or stated.  Informed consent was obtained.        Eddie Barry MD  Orthopedics  [facility]

## 2020-08-18 NOTE — OP NOTE
OP NOTE    DOS:  08/18/2020    Preop Dx: Right bicondylar tibial plateau fracture    Osteoporosis    Postop Dx: Right bicondylar tibial plateau fracture    Osteoporosis    Procedure: Open reduction internal fixation right bicondylar tibial plateau fracture - 75617    Surgeon: Jordy Ortega M.D.    Asst:  Gerber Chowdhury M.D    Anesthesia: General endotracheal    EBL:  50 cc    IVF:  2000 cc crystalloid    Implants: Synthes 4 hole medial proximal tibial plate    Specimens: None    Findings: Very soft bone able to regain alignment.    Dispo:  To PACU extubated/stable       Indications for Procedure:      30-year-old female with hypopituitarism and osteoporosis fell from standing height while walking her dog sustaining a right bicondylar tibial plateau fracture.  Her fracture is impacted in the anteromedial portion of the medial tibial plateau and crosses the lateral side in a nondisplaced manner.  I bring her to the operating room for either spanning external fixation or open reduction internal fixation depending on her soft tissue status will begin the operating room.  The risks, benefits and alternatives to surgery were discussed the patient prior to going the operating room.  Informed consent was obtained.    Procedure in Detail:    Patient was identified in preoperative holding area the site was marked.  Regional analgesia was withheld until postoperative swelling to be assessed.  Patient was wheeled into the operating room and placed on the operating table in supine position.  General endotracheal anesthesia was induced and preoperative antibiotics were administered.  The right lower extremity was taken out of its splint and I thought her soft tissues were ankle nicely and we would proceed with operative fixation.  A nonsterile tourniquet was placed and then the right lower extremity prepped and draped in sterile fashion.  A time-out was undertaken to confirm patient, side, site, surgery, surgeon and administration  of preoperative antibiotics.  All agreed we proceeded.  The leg was exsanguinated and the tourniquet was raised.    I began on the medial side incising the posterior medial border of the tibia.  I incised the skin subcutaneous tissues and then followed down through the pes tendons.  I elevated the MCL off the medial side of the tibia and identified the area the fracture fragments.  The patient had intercalary fragments on the anteromedial portion the tibia going up to a rim of bone around the medial side the tibia.  I incised into the fracture site and then elevated this with a [place].  I then placed a Guevara into the fracture site and placed a tampon to the Guevara and impacted the anteromedial portion of the plateau back up to get her out of recurvatum.  The lateral side stating good position.  She has an intact proximal fibula.    I selected a Synthes T shaped proximal tibia plate.  I bent this to better fit her proximal tibia allowed me to get a more proximal position the plate than it normally sets.  I fixed it with a K-wire and then placed a single bicortical conical screw in the center hole to pull the plate down to bone.  I then pulled the leg into valgus as she was in varus and placed a bump underneath the knee to get her out of recurvatum.  I placed a distraction screw in the shaft to push the plate more proximally and correct her mild varus.  At this point I had good position on a placed another cortical screw in the shaft.  I then placed locking screws anterior and posterior to the initial bicortical conical screw after we tightening it.  I then removed the conical screw and replaced this with a bicortical locking screw.  Her bone was incredibly soft.  At this point I visualized the entire construct and fractures.  Ideally I would like a buttress on the lateral side but her bone is so soft and I wanted to avoid having to stress risers on the tibia side elected to stay with just a medial plate.  For  this reason I used both kickstand screw holes to place bicortical screws going across to the posterolateral aspect the tibia.  I visualized entire construct on fluoroscopy and had good reduction the fracture and hardware placement.  Her fibula is intact also acting as a strut on the lateral side to prevent valgus.    The tourniquet was let down and hemostasis obtained electrocautery.  Total tourniquet time 62 min.  The MCL and pes tissues were closed with 0 Vicryl suture over vancomycin powder.  The next layer fascia was closed 0 Vicryl suture over vancomycin powder.  The subcutaneous tissue was closed with inverted 3-0 Vicryl suture and the skin with 3-0 strata fix and Dermabond.  An Aquacel dressing was then applied.    All instrument sponge counts were reported correct in the case.  There no complications.  The patient was extubated, awakened and taken to recovery in stable condition with very soft compartments.    Plan for the patient:    She does not take calcium or vitamin-D and has known osteoporosis secondary to hypopituitarism.  I will consult endocrinology who she has not seen last 3 years.  I am going to check her vitamin-D levels as other osteoporosis markers.  I will allow her range of motion as tolerated but she will be nonweightbearing for 10-12 weeks pending fracture healing.  I think she will need at least vitamin-D and calcium treatment for her osteoporosis.  Should she be a candidate for bisphosphonate I would like to hold off on this until her fractures had some time to heal as this prolongs the callus phase of healing.  I would ideally like in anabolic a osteoporotic medication, however, at her young age and with only a total time of use for that medication of 2 years this also has its drawbacks.    Jordy Ortega MD

## 2020-08-18 NOTE — HPI
Ani Montoya is a 30 y.o. female with PMH significant for hypopituitarism, osteoporosis (on hormones and has osteopenia on most recent DEXA), primary amenorrhea presenting with R knee pain. Pt endorsed her R knee giving out while stepping over curb walking her dog and landed onto R knee. Felt Immediate R knee pain prior to fall and inability to bear weight, so presented to [facility]. Xrays showed bicondylar tibial plateau fx and was transferred to [facility] [place] for orthopedic surgical intervention. Patient denies any head trauma or LOC. The patient denies prior hx of falls. Patient denies numbness and tingling. Endorses difficulty performing plantar flexion. Denies any other musculoskeletal pain or injuries. No known history of prior RLE injury or surgery. Walks w/out assisted devices at baseline. Doesn't take any anticoagulation at baseline.

## 2020-08-18 NOTE — NURSING TRANSFER
Nursing Transfer Note      8/18/2020     Transfer To: 609    Transfer via bed    Transfer with 2L NC to O2    Transported by pct    Medicines sent: perineural    Chart send with patient: Yes    Notified: spouse    Patient reassessed at: 8/18/20 see flowsheets

## 2020-08-18 NOTE — ED NOTES
Adult Physical Assessment  LOC: Ani Montoya, 30 y.o. female verified via two identifiers.  The patient is awake, alert, oriented and speaking appropriately at this time.  APPEARANCE: Patient resting comfortably and appears to be in no acute distress at this time. Patient is clean and well groomed, patient's clothing is properly fastened.  SKIN:The skin is warm and dry, color consistent with ethnicity, patient has normal skin turgor and moist mucus membranes, skin intact, no breakdown or brusing noted.  MUSCULOSKELETAL: Patient with right leg swelling and unable to extend and flex right foot. Palpable pedal pulses bilaterally.  RESPIRATORY: Airway is open and patent, respirations are spontaneous, patient has a normal effort and rate, no accessory muscle use noted.  CARDIAC: Patient has a normal rate and rhythm, no periphreal edema noted in any extremity, capillary refill < 3 seconds in all extremities  ABDOMEN: Soft and non tender to palpation, no abdominal distention noted. Bowel sounds present in all four quadrants.  NEUROLOGIC: Eyes open spontaneously, behavior appropriate to situation, follows commands, facial expression symmetrical, bilateral hand grasp equal and even, purposeful motor response noted, normal sensation in all extremities when touched with a finger.

## 2020-08-18 NOTE — SUBJECTIVE & OBJECTIVE
"Principal Problem:Closed fracture of right tibial plateau    Principal Orthopedic Problem: same    Interval History: Patient examined at the bedside. NAEON. Pain controlled. Tolerating NPO w/out N/V and voiding appropriately.     Review of patient's allergies indicates:  No Known Allergies    Current Facility-Administered Medications   Medication    0.9%  NaCl infusion    acetaminophen tablet 650 mg    acetaminophen tablet 650 mg    lidocaine (PF) 10 mg/ml (1%) injection 10 mg    melatonin tablet 6 mg    ondansetron disintegrating tablet 8 mg    oxyCODONE immediate release tablet 10 mg    oxyCODONE immediate release tablet 5 mg    promethazine (PHENERGAN) 6.25 mg in dextrose 5 % 50 mL IVPB    sodium chloride 0.9% flush 10 mL    sodium chloride 0.9% flush 10 mL     Objective:     Vital Signs (Most Recent):  Temp: 98.3 °F (36.8 °C) (08/18/20 0521)  Pulse: 74 (08/18/20 0521)  Resp: 18 (08/18/20 0532)  BP: (!) 131/96 (08/18/20 0521)  SpO2: (!) 92 % (08/18/20 0521) Vital Signs (24h Range):  Temp:  [98.2 °F (36.8 °C)-98.6 °F (37 °C)] 98.3 °F (36.8 °C)  Pulse:  [67-96] 74  Resp:  [16-18] 18  SpO2:  [92 %-100 %] 92 %  BP: (105-145)/(57-97) 131/96     Weight: 65.7 kg (144 lb 13.5 oz)  Height: 4' 11" (149.9 cm)  Body mass index is 29.25 kg/m².    No intake or output data in the 24 hours ending 08/18/20 0543    Ortho/SPM Exam   AAOx4  NAD  Reg rate  No increased WOB    RLE  Long leg c/d/i  Ice in place  SILT T/SP/DP/Pinon/Sa  Motor intact to EHL/FHL  WWP extremities  Compartments soft and compressible  Mild swelling  FCDs in place and functioning    Significant Labs: All pertinent labs within the past 24 hours have been reviewed.    Significant Imaging: I have reviewed all pertinent imaging results/findings.  "

## 2020-08-18 NOTE — OR NURSING
Patient's glasses and hair elastic placed in plastic bag with patient label and brought to PACU with patient.

## 2020-08-18 NOTE — TRANSFER OF CARE
"Anesthesia Transfer of Care Note    Patient: Ani Montoya    Procedure(s) Performed: Procedure(s) (LRB):  ORIF, FRACTURE, TIBIA, PLATEAU (Right)    Anesthesia Type: general    Transport from OR: Transported from OR on 6-10 L/min O2 by face mask with adequate spontaneous ventilation    Post pain: pain needs to be addressed    Post assessment: no apparent anesthetic complications    Post vital signs: stable    Level of consciousness: awake and alert    Nausea/Vomiting: no nausea/vomiting    Complications: none    Transfer of care protocol was followed      Last vitals:   Visit Vitals  BP (!) 140/92 (BP Location: Right arm, Patient Position: Lying)   Pulse 109   Temp 36.6 °C (97.9 °F) (Temporal)   Resp 19   Ht 4' 11" (1.499 m)   Wt 65.7 kg (144 lb 13.5 oz)   SpO2 100%   Breastfeeding No   BMI 29.25 kg/m²     "

## 2020-08-18 NOTE — ASSESSMENT & PLAN NOTE
Ani Montoya is a 30 y.o. female with PMH of hypopituitarism, osteopenia, amenorrhea presenting with R tibial plateau fx. Closed, NVI. Educated patient that she will likely need operative intervention for her fracture which includes external fixator placement versus primary fixation.  --Admit to Orthopedic surgery  --Pain control: multimodal  --Pt marked and consented, case booked. Pt understands need for definitive treatment of injuries.  --Pre-operative labs and imaging obtained in ED (UA, Type and Cross, PT-INR, CBC, BMP, PreAlbumin, CXR, EKG)   --Labs: Hb 13.1 Plt 213, Cr 0.9, , Ua wnl  --DVT PPx: hold chemical, FCDs at all times when not ambulating  --Bed rest, mitchell, NPO      Risks and complications were discussed including but not limited to the risks of anesthetic complications, infection, wound healing complications, non-union, mal-union, hardware failure, pin site infection, pain, stiffness, DVT, pulmonary embolism, perioperative medical risks (cardiac, pulmonary, renal, neurologic), and death among others were discussed. No guarantees were made and the patient and family elect to proceed. All questions were answered.  No guarantees were implied or stated.  Informed consent was obtained.

## 2020-08-18 NOTE — PROGRESS NOTES
"[facility]  Orthopedics  Progress Note    Patient Name: Ani Montoya  MRN: [id number]  Admission Date: 8/17/2020  Hospital Length of Stay: 1 days  Attending Provider: Jordy Ortega MD  Primary Care Provider: Sylvie Mabry MD  Follow-up For: Procedure(s) (LRB):  APPLICATION, EXTERNAL FIXATION DEVICE, LARGE, TIBIA (Right) - SYNTHES Trios table, C-arm door side (Right)    Post-Operative Day:    Subjective:     Principal Problem:Closed fracture of right tibial plateau    Principal Orthopedic Problem: same    Interval History: Patient examined at the bedside. NAEON. Pain controlled. Tolerating NPO w/out N/V and voiding appropriately.     Review of patient's allergies indicates:  No Known Allergies    Current Facility-Administered Medications   Medication    0.9%  NaCl infusion    acetaminophen tablet 650 mg    acetaminophen tablet 650 mg    lidocaine (PF) 10 mg/ml (1%) injection 10 mg    melatonin tablet 6 mg    ondansetron disintegrating tablet 8 mg    oxyCODONE immediate release tablet 10 mg    oxyCODONE immediate release tablet 5 mg    promethazine (PHENERGAN) 6.25 mg in dextrose 5 % 50 mL IVPB    sodium chloride 0.9% flush 10 mL    sodium chloride 0.9% flush 10 mL     Objective:     Vital Signs (Most Recent):  Temp: 98.3 °F (36.8 °C) (08/18/20 0521)  Pulse: 74 (08/18/20 0521)  Resp: 18 (08/18/20 0532)  BP: (!) 131/96 (08/18/20 0521)  SpO2: (!) 92 % (08/18/20 0521) Vital Signs (24h Range):  Temp:  [98.2 °F (36.8 °C)-98.6 °F (37 °C)] 98.3 °F (36.8 °C)  Pulse:  [67-96] 74  Resp:  [16-18] 18  SpO2:  [92 %-100 %] 92 %  BP: (105-145)/(57-97) 131/96     Weight: 65.7 kg (144 lb 13.5 oz)  Height: 4' 11" (149.9 cm)  Body mass index is 29.25 kg/m².    No intake or output data in the 24 hours ending 08/18/20 0543    Ortho/SPM Exam   AAOx4  NAD  Reg rate  No increased WOB    RLE  Long leg c/d/i  Ice in place  SILT T/SP/DP/Pinon/Sa  Motor intact to EHL/FHL  WWP extremities  Compartments " soft and compressible  Mild swelling  FCDs in place and functioning    Significant Labs: All pertinent labs within the past 24 hours have been reviewed.    Significant Imaging: I have reviewed all pertinent imaging results/findings.    Assessment/Plan:     * Closed fracture of right tibial plateau  Ani Montoya is a 30 y.o. female with PMH of hypopituitarism, osteopenia, amenorrhea presenting with R tibial plateau fx. Closed, NVI. Educated patient that she will likely need operative intervention for her fracture which includes external fixator placement versus primary fixation.    --Pain control: multimodal  --Pt marked and consented, case booked. Pt understands need for definitive treatment of injuries.  --Pre-operative labs and imaging obtained in ED (UA, Type and Cross, PT-INR, CBC, BMP, PreAlbumin, CXR, EKG)   --Labs: Hb 13.1 Plt 213, Cr 0.9, , Ua wnl  --DVT PPx: hold chemical, FCDs at all times when not ambulating  --Bed rest, paula, LEIDY Barry MD  Orthopedics  [facility]

## 2020-08-19 LAB
ALBUMIN SERPL BCP-MCNC: 3.6 G/DL (ref 3.5–5.2)
ALP SERPL-CCNC: 90 U/L (ref 55–135)
ALT SERPL W/O P-5'-P-CCNC: 11 U/L (ref 10–44)
ANION GAP SERPL CALC-SCNC: 9 MMOL/L (ref 8–16)
AST SERPL-CCNC: 21 U/L (ref 10–40)
BASOPHILS # BLD AUTO: 0.02 K/UL (ref 0–0.2)
BASOPHILS NFR BLD: 0.2 % (ref 0–1.9)
BILIRUB SERPL-MCNC: 0.2 MG/DL (ref 0.1–1)
BUN SERPL-MCNC: 10 MG/DL (ref 6–20)
CALCIUM SERPL-MCNC: 9.2 MG/DL (ref 8.7–10.5)
CHLORIDE SERPL-SCNC: 103 MMOL/L (ref 95–110)
CO2 SERPL-SCNC: 26 MMOL/L (ref 23–29)
CREAT SERPL-MCNC: 0.8 MG/DL (ref 0.5–1.4)
DIFFERENTIAL METHOD: ABNORMAL
EOSINOPHIL # BLD AUTO: 0 K/UL (ref 0–0.5)
EOSINOPHIL NFR BLD: 0.3 % (ref 0–8)
ERYTHROCYTE [DISTWIDTH] IN BLOOD BY AUTOMATED COUNT: 13.5 % (ref 11.5–14.5)
EST. GFR  (AFRICAN AMERICAN): >60 ML/MIN/1.73 M^2
EST. GFR  (NON AFRICAN AMERICAN): >60 ML/MIN/1.73 M^2
GLUCOSE SERPL-MCNC: 93 MG/DL (ref 70–110)
HCT VFR BLD AUTO: 37.5 % (ref 37–48.5)
HGB BLD-MCNC: 11.8 G/DL (ref 12–16)
IMM GRANULOCYTES # BLD AUTO: 0.07 K/UL (ref 0–0.04)
IMM GRANULOCYTES NFR BLD AUTO: 0.6 % (ref 0–0.5)
LYMPHOCYTES # BLD AUTO: 1.4 K/UL (ref 1–4.8)
LYMPHOCYTES NFR BLD: 12.8 % (ref 18–48)
MCH RBC QN AUTO: 30.6 PG (ref 27–31)
MCHC RBC AUTO-ENTMCNC: 31.5 G/DL (ref 32–36)
MCV RBC AUTO: 97 FL (ref 82–98)
MONOCYTES # BLD AUTO: 1 K/UL (ref 0.3–1)
MONOCYTES NFR BLD: 8.9 % (ref 4–15)
NEUTROPHILS # BLD AUTO: 8.5 K/UL (ref 1.8–7.7)
NEUTROPHILS NFR BLD: 77.2 % (ref 38–73)
NRBC BLD-RTO: 0 /100 WBC
PLATELET # BLD AUTO: 158 K/UL (ref 150–350)
PMV BLD AUTO: 11.3 FL (ref 9.2–12.9)
POTASSIUM SERPL-SCNC: 3.7 MMOL/L (ref 3.5–5.1)
PROT SERPL-MCNC: 7.6 G/DL (ref 6–8.4)
RBC # BLD AUTO: 3.86 M/UL (ref 4–5.4)
SODIUM SERPL-SCNC: 138 MMOL/L (ref 136–145)
WBC # BLD AUTO: 10.95 K/UL (ref 3.9–12.7)

## 2020-08-19 PROCEDURE — 85025 COMPLETE CBC W/AUTO DIFF WBC: CPT

## 2020-08-19 PROCEDURE — 25000003 PHARM REV CODE 250: Performed by: ORTHOPAEDIC SURGERY

## 2020-08-19 PROCEDURE — 80053 COMPREHEN METABOLIC PANEL: CPT

## 2020-08-19 PROCEDURE — 97116 GAIT TRAINING THERAPY: CPT

## 2020-08-19 PROCEDURE — 97530 THERAPEUTIC ACTIVITIES: CPT

## 2020-08-19 PROCEDURE — 27000221 HC OXYGEN, UP TO 24 HOURS

## 2020-08-19 PROCEDURE — 25000003 PHARM REV CODE 250: Performed by: STUDENT IN AN ORGANIZED HEALTH CARE EDUCATION/TRAINING PROGRAM

## 2020-08-19 PROCEDURE — 63600175 PHARM REV CODE 636 W HCPCS: Performed by: STUDENT IN AN ORGANIZED HEALTH CARE EDUCATION/TRAINING PROGRAM

## 2020-08-19 PROCEDURE — 97165 OT EVAL LOW COMPLEX 30 MIN: CPT

## 2020-08-19 PROCEDURE — 99231 SBSQ HOSP IP/OBS SF/LOW 25: CPT | Mod: ,,, | Performed by: ANESTHESIOLOGY

## 2020-08-19 PROCEDURE — 99231 PR SUBSEQUENT HOSPITAL CARE,LEVL I: ICD-10-PCS | Mod: ,,, | Performed by: ANESTHESIOLOGY

## 2020-08-19 PROCEDURE — 36415 COLL VENOUS BLD VENIPUNCTURE: CPT

## 2020-08-19 PROCEDURE — 99900035 HC TECH TIME PER 15 MIN (STAT)

## 2020-08-19 PROCEDURE — 97161 PT EVAL LOW COMPLEX 20 MIN: CPT

## 2020-08-19 PROCEDURE — 94761 N-INVAS EAR/PLS OXIMETRY MLT: CPT

## 2020-08-19 PROCEDURE — 97535 SELF CARE MNGMENT TRAINING: CPT

## 2020-08-19 PROCEDURE — 11000001 HC ACUTE MED/SURG PRIVATE ROOM

## 2020-08-19 RX ORDER — FERROUS SULFATE, DRIED 160(50) MG
1 TABLET, EXTENDED RELEASE ORAL 2 TIMES DAILY WITH MEALS
Qty: 60 TABLET | Refills: 11 | Status: SHIPPED | OUTPATIENT
Start: 2020-08-19 | End: 2020-09-22 | Stop reason: SDUPTHER

## 2020-08-19 RX ORDER — ERGOCALCIFEROL 1.25 MG/1
50000 CAPSULE ORAL
Qty: 12 CAPSULE | Refills: 1 | Status: SHIPPED | OUTPATIENT
Start: 2020-08-19 | End: 2020-09-22 | Stop reason: SDUPTHER

## 2020-08-19 RX ORDER — OXYCODONE HYDROCHLORIDE 5 MG/1
5 TABLET ORAL EVERY 4 HOURS PRN
Status: DISCONTINUED | OUTPATIENT
Start: 2020-08-19 | End: 2020-08-21 | Stop reason: HOSPADM

## 2020-08-19 RX ORDER — CELECOXIB 100 MG/1
100 CAPSULE ORAL DAILY
Status: DISCONTINUED | OUTPATIENT
Start: 2020-08-19 | End: 2020-08-21 | Stop reason: HOSPADM

## 2020-08-19 RX ORDER — PREGABALIN 50 MG/1
50 CAPSULE ORAL NIGHTLY
Status: DISCONTINUED | OUTPATIENT
Start: 2020-08-19 | End: 2020-08-21 | Stop reason: HOSPADM

## 2020-08-19 RX ORDER — POLYETHYLENE GLYCOL 3350 17 G/17G
17 POWDER, FOR SOLUTION ORAL 2 TIMES DAILY
Status: DISCONTINUED | OUTPATIENT
Start: 2020-08-19 | End: 2020-08-21 | Stop reason: HOSPADM

## 2020-08-19 RX ORDER — METHOCARBAMOL 500 MG/1
500 TABLET, FILM COATED ORAL 3 TIMES DAILY
Status: DISCONTINUED | OUTPATIENT
Start: 2020-08-19 | End: 2020-08-21 | Stop reason: HOSPADM

## 2020-08-19 RX ORDER — ACETAMINOPHEN 500 MG
1000 TABLET ORAL EVERY 8 HOURS
Status: DISCONTINUED | OUTPATIENT
Start: 2020-08-19 | End: 2020-08-21 | Stop reason: HOSPADM

## 2020-08-19 RX ORDER — GABAPENTIN 300 MG/1
300 CAPSULE ORAL 3 TIMES DAILY
Status: DISCONTINUED | OUTPATIENT
Start: 2020-08-19 | End: 2020-08-19

## 2020-08-19 RX ORDER — ERGOCALCIFEROL 1.25 MG/1
50000 CAPSULE ORAL
Status: DISCONTINUED | OUTPATIENT
Start: 2020-08-19 | End: 2020-08-21 | Stop reason: HOSPADM

## 2020-08-19 RX ADMIN — GABAPENTIN 300 MG: 300 CAPSULE ORAL at 09:08

## 2020-08-19 RX ADMIN — DIPHENHYDRAMINE HYDROCHLORIDE 25 MG: 25 CAPSULE ORAL at 05:08

## 2020-08-19 RX ADMIN — CALCIUM 500 MG: 500 TABLET ORAL at 10:08

## 2020-08-19 RX ADMIN — ASPIRIN 81 MG CHEWABLE TABLET 81 MG: 81 TABLET CHEWABLE at 10:08

## 2020-08-19 RX ADMIN — METHOCARBAMOL 500 MG: 500 TABLET ORAL at 09:08

## 2020-08-19 RX ADMIN — METHOCARBAMOL 500 MG: 500 TABLET ORAL at 02:08

## 2020-08-19 RX ADMIN — CLINDAMYCIN IN 5 PERCENT DEXTROSE 600 MG: 12 INJECTION, SOLUTION INTRAVENOUS at 11:08

## 2020-08-19 RX ADMIN — OXYCODONE 5 MG: 5 TABLET ORAL at 10:08

## 2020-08-19 RX ADMIN — PREGABALIN 50 MG: 50 CAPSULE ORAL at 10:08

## 2020-08-19 RX ADMIN — CALCIUM 500 MG: 500 TABLET ORAL at 08:08

## 2020-08-19 RX ADMIN — VITAMIN D, TAB 1000IU (100/BT) 1000 UNITS: 25 TAB at 08:08

## 2020-08-19 RX ADMIN — ERGOCALCIFEROL 50000 UNITS: 1.25 CAPSULE ORAL at 11:08

## 2020-08-19 RX ADMIN — DOCUSATE SODIUM 100 MG: 100 CAPSULE, LIQUID FILLED ORAL at 10:08

## 2020-08-19 RX ADMIN — POLYETHYLENE GLYCOL 3350 17 G: 17 POWDER, FOR SOLUTION ORAL at 10:08

## 2020-08-19 RX ADMIN — CELECOXIB 100 MG: 100 CAPSULE ORAL at 09:08

## 2020-08-19 RX ADMIN — OXYCODONE 5 MG: 5 TABLET ORAL at 11:08

## 2020-08-19 RX ADMIN — ROPIVACAINE HYDROCHLORIDE 8 ML/HR: 2 INJECTION, SOLUTION EPIDURAL; INFILTRATION at 06:08

## 2020-08-19 RX ADMIN — ACETAMINOPHEN 1000 MG: 500 TABLET ORAL at 10:08

## 2020-08-19 RX ADMIN — ACETAMINOPHEN 1000 MG: 500 TABLET ORAL at 02:08

## 2020-08-19 RX ADMIN — DOCUSATE SODIUM 100 MG: 100 CAPSULE, LIQUID FILLED ORAL at 08:08

## 2020-08-19 RX ADMIN — CEFAZOLIN 2 G: 1 INJECTION, POWDER, FOR SOLUTION INTRAMUSCULAR; INTRAVENOUS at 11:08

## 2020-08-19 RX ADMIN — METHOCARBAMOL 500 MG: 500 TABLET ORAL at 10:08

## 2020-08-19 RX ADMIN — ASPIRIN 81 MG CHEWABLE TABLET 81 MG: 81 TABLET CHEWABLE at 08:08

## 2020-08-19 NOTE — PLAN OF CARE
OT Acute eval complete. Goals established. Pt motivated for therapy and tolerated session well secondary to pain. Pt required Min A in bed mobility, t/f's, and ambulation due to decreased balance. Pt ambulated ~15ft CGA using RW to increase activity tolerance required for adls and functional mobility. Pt required Min A in toileting for gown management and standing balance. Pt  had 1 LOB while performing perineal hygiene. Pt would benefit from OP PT upon d/c to return to [facility].       Problem: Occupational Therapy Goal  Goal: Occupational Therapy Goal  Description: Goals to be met by: 9/19/2020     Patient will increase functional independence with ADLs by performing:    UE Dressing with Stand-by Assistance.  LE Dressing with Stand-by Assistance.  Grooming while standing at sink with Stand-by Assistance.  Toileting from bedside commode with Stand-by Assistance for hygiene and clothing management.   Toilet transfer to bedside commode with Stand-by Assistance.    Outcome: Ongoing, Progressing

## 2020-08-19 NOTE — PLAN OF CARE
Pt remains free from falls and injury. Pt makes statement of 8 pain however states she does not want analgesic. Bed low and locked, Call light within reach.

## 2020-08-19 NOTE — ADDENDUM NOTE
Addendum  created 08/19/20 1331 by Candido Mota MD    Charge Capture section accepted, Cosign clinical note with attestation

## 2020-08-19 NOTE — ANESTHESIA POSTPROCEDURE EVALUATION
Anesthesia Post Evaluation    Patient: Ani Montoya    Procedure(s) Performed: Procedure(s) (LRB):  ORIF, FRACTURE, TIBIA, PLATEAU (Right)    Final Anesthesia Type: general    Patient location during evaluation: floor (609)  Patient participation: Yes- Able to Participate  Level of consciousness: awake and alert and oriented  Post-procedure vital signs: reviewed and stable  Pain management: adequate  Airway patency: patent    PONV status at discharge: No PONV  Anesthetic complications: no      Cardiovascular status: stable  Respiratory status: unassisted, spontaneous ventilation and room air  Hydration status: euvolemic  Follow-up not needed.          Vitals Value Taken Time   BP 89/65 08/19/20 0701   Temp 36.8 °C (98.2 °F) 08/19/20 0701   Pulse 64 08/19/20 0701   Resp 16 08/19/20 0701   SpO2 97 % 08/19/20 0701         Event Time   Out of Recovery 08/18/2020 16:00:00         Pain/Robb Score: Pain Rating Prior to Med Admin: 7 (8/19/2020  9:46 AM)  Pain Rating Post Med Admin: 4 (8/18/2020  3:45 PM)  Robb Score: 10 (8/18/2020  7:00 PM)

## 2020-08-19 NOTE — PT/OT/SLP EVAL
Occupational Therapy   Evaluation    Name: Ani Montoya  MRN: [id number]  Admitting Diagnosis:  Closed bicondylar fracture of right tibial plateau 1 Day Post-Op    Recommendations:     Discharge Recommendations: outpatient PT  Discharge Equipment Recommendations:  bedside commode, walker, rolling, wheelchair  Barriers to discharge:       Assessment:     Ani Montoya is a 30 y.o. female with a medical diagnosis of Closed bicondylar fracture of right tibial plateau.  She presents with the following. Performance deficits affecting function: weakness, impaired endurance, impaired self care skills, impaired functional mobilty, gait instability, impaired balance, decreased lower extremity function, decreased safety awareness, pain, orthopedic precautions.      OT Acute eval complete. Goals established. Pt motivated for therapy and tolerated session well secondary to pain. Pt required Min A in bed mobility, t/f's, and ambulation due to decreased balance. Pt ambulated ~15ft CGA using RW to increase activity tolerance required for adls and functional mobility. Pt required Min A in toileting for gown management and standing balance. Pt  had 1 LOB while performing perineal hygiene. Pt would benefit from OP PT upon d/c to return to [facility].     Rehab Prognosis: Good; patient would benefit from acute skilled OT services to address these deficits and reach maximum level of function.       Plan:     Patient to be seen daily to address the above listed problems via self-care/home management, therapeutic activities, therapeutic exercises  · Plan of Care Expires: 09/18/20  · Plan of Care Reviewed with: patient    Subjective     Chief Complaint: pain, anxiety  Patient/Family Comments/goals: to get better     Occupational Profile:  Living Environment: Pt lives with fiance in [facility] with [address]STE. Friend will also be staying at house for a few months.  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    Pain/Comfort:  · Pain Rating 1: 8/10  · Location - Side 1: Right  · Location 1: leg  · Pain Addressed 1: Reposition, Distraction, Cessation of Activity    Patients cultural, spiritual, Orthodox conflicts given the current situation: no    Objective:     Communicated with: RN prior to session.  Patient found supine with perineural catheter, SCD upon OT entry to room.    General Precautions: Standard, fall   Orthopedic Precautions:RLE non weight bearing(ROM at tolerated)   Braces:       Occupational Performance:    Bed Mobility:    · Patient completed Scooting/Bridging with minimum assistance  · Patient completed Supine to Sit with minimum assistance    Functional Mobility/Transfers:  · Patient completed Sit <> Stand Transfer with minimum assistance  with  rolling walker   · Patient completed Bed <> Chair Transfer using Step Transfer technique with minimum assistance with rolling walker  · Patient completed Toilet Transfer Step Transfer technique with minimum assistance with  rolling walker  · Functional Mobility: OT Acute eval complete. Goals established. Pt motivated for therapy and tolerated session well secondary to pain. Pt required Min A in bed mobility, t/f's, and ambulation due to decreased balance. Pt ambulated ~15ft CGA using RW to increase activity tolerance required for adls and functional mobility. Pt required Min A in toileting for gown management and standing balance. Pt  had 1 LOB while performing perineal hygiene.     Activities of Daily Living:  · Grooming: stand by assistance seated UIC with setup assistance  · Upper Body Dressing: minimum assistance anya gown  · Toileting: minimum assistance for standing balance and gown management    Cognitive/Visual Perceptual:  Cognitive/Psychosocial Skills:     -       Oriented to: Person, Place, Time and Situation   -       Safety  awareness/insight to disability: intact     Physical Exam:  Upper Extremity Range of Motion:     -       Right Upper Extremity: WFL  -       Left Upper Extremity: WFL  Upper Extremity Strength:    -       Right Upper Extremity: WFL  -       Left Upper Extremity: WFL   Strength:    -       Right Upper Extremity: WFL  -       Left Upper Extremity: WFL    AMPAC 6 Click ADL:  AMPAC Total Score: 18    Treatment & Education:  - OT/POC-  - Importance of mobility to maximize recovery.  - Educated pt on NWB RLE precautions  - safety w/ functional mobility; hand placement to ensure safe transfers to various surfaces in prep for ADLs  - Reviewed gait sequence and RW management via verbalization and demonstration   - encouraged to ambulate within household environment at least every hour to hour 1/2    Education:    Patient left up in chair with all lines intact, call button in reach and RN notified    GOALS:   Multidisciplinary Problems     Occupational Therapy Goals        Problem: Occupational Therapy Goal    Goal Priority Disciplines Outcome Interventions   Occupational Therapy Goal     OT, PT/OT Ongoing, Progressing    Description: Goals to be met by: 9/19/2020     Patient will increase functional independence with ADLs by performing:    UE Dressing with Stand-by Assistance.  LE Dressing with Stand-by Assistance.  Grooming while standing at sink with Stand-by Assistance.  Toileting from bedside commode with Stand-by Assistance for hygiene and clothing management.   Toilet transfer to bedside commode with Stand-by Assistance.                     History:     Past Medical History:   Diagnosis Date    Hypopituitarism     Osteoporosis     Primary amenorrhea        Past Surgical History:   Procedure Laterality Date    breast aug Bilateral 2012    OPEN REDUCTION AND INTERNAL FIXATION (ORIF) OF FRACTURE OF TIBIAL PLATEAU Right 8/18/2020    Procedure: ORIF, FRACTURE, TIBIA, PLATEAU;  Surgeon: Jordy Ortega MD;   Location: [facility] OR [address] [place] [address];  Service: Orthopedics;  Laterality: Right;       Time Tracking:     OT Date of Treatment: 08/19/20  OT Start Time: 0825  OT Stop Time: 0915  OT Total Time (min): 50 min    Billable Minutes:Evaluation 15  Self Care/Home Management 35    Becky Gardner OT  8/19/2020

## 2020-08-19 NOTE — ANESTHESIA POST-OP PAIN MANAGEMENT
Acute Pain Service Progress Note    Ani Montoya is a 30 y.o., female, [id number]. PMH hypopituitarism, osteoporosis, primary amenorrhea, s/p fall on R knee presenting with tibial plateau fracture.     Surgery: R ORIF, FRACTURE, TIBIA, PLATEAU    Post Op Day #: 1    Catheter type: perineural  R adductor canal    Infusion type: Ropivacaine 0.2%  8mL/hr basal    Problem List:    Active Hospital Problems    Diagnosis  POA    *Closed bicondylar fracture of right tibial plateau [S82.141A]  Yes      Resolved Hospital Problems   No resolved problems to display.       Subjective:     General appearance of: alert and orientedx3. Since surgery yesterday, patient has not taken an PRN opiate pain medication because she does not want to if she can tolerate the pain on her own. She rates her pain at a 7 at rest and 10 with movement currently in the back of her leg. Bolused catheter at bedside this morning to help referred pain. No other complaints at this time.   Pain with rest: 7    Numbers   Pain with movement: 10    Numbers   Side Effects    1. Pruritis No    2. Nausea No    3. Motor Blockade No    4. Sedation No, 1=awake and alert    Objective:     Catheter level R thigh    Catheter site clean, dry, intact      Vitals   Vitals:    08/19/20 1155   BP: 106/60   Pulse: 78   Resp: 20   Temp: 36.8 °C (98.3 °F)        Labs    Admission on 08/17/2020   Component Date Value Ref Range Status    Hemoglobin A1C 08/17/2020 5.0  4.0 - 5.6 % Final    Estimated Avg Glucose 08/17/2020 97  68 - 131 mg/dL Final    Magnesium 08/17/2020 1.8  1.6 - 2.6 mg/dL Final    Prealbumin 08/17/2020 25  20 - 43 mg/dL Final    Transferrin 08/17/2020 237  200 - 375 mg/dL Final    Group & Rh 08/17/2020 O POS   Final    Indirect Isaac 08/17/2020 NEG   Final    Specimen UA 08/18/2020 Urine, Clean Catch   Final    Color, UA 08/18/2020 Straw  Yellow, Straw, Mely Final    Appearance, UA 08/18/2020 Clear  Clear Final    pH, UA 08/18/2020 7.0  5.0 - 8.0  Final    Specific Gravity, UA 08/18/2020 1.030  1.005 - 1.030 Final    Protein, UA 08/18/2020 Negative  Negative Final    Glucose, UA 08/18/2020 Negative  Negative Final    Ketones, UA 08/18/2020 Negative  Negative Final    Bilirubin (UA) 08/18/2020 Negative  Negative Final    Occult Blood UA 08/18/2020 Negative  Negative Final    Nitrite, UA 08/18/2020 Negative  Negative Final    Leukocytes, UA 08/18/2020 Negative  Negative Final    Prothrombin Time 08/17/2020 11.3  9.0 - 12.5 sec Final    INR 08/17/2020 1.0  0.8 - 1.2 Final    aPTT 08/17/2020 21.7  21.0 - 32.0 sec Final    PTH, Intact 08/18/2020 218.0* 9.0 - 77.0 pg/mL Final    Vit D, 25-Hydroxy 08/18/2020 12* 30 - 96 ng/mL Final    TSH 08/18/2020 0.785  0.400 - 4.000 uIU/mL Final    Free T4 08/18/2020 0.89  0.71 - 1.51 ng/dL Final    Phosphorus 08/18/2020 3.0  2.7 - 4.5 mg/dL Final        Meds   Current Facility-Administered Medications   Medication Dose Route Frequency Provider Last Rate Last Dose    0.9%  NaCl infusion   Intravenous Continuous Eddie Barry MD   Stopped at 08/18/20 1158    acetaminophen tablet 1,000 mg  1,000 mg Oral Q8H Caity Carballo MD        aspirin chewable tablet 81 mg  81 mg Oral BID Gerber Rohit Chowdhury MD   81 mg at 08/19/20 0825    calcium carbonate (OS-MICAH) tablet 500 mg  500 mg Oral BID Gerber Rohit Chowdhury MD   500 mg at 08/19/20 0825    ceFAZolin injection 2 g  2 g Intravenous Q8H Gerber Rohit Chowdhury MD   2 g at 08/19/20 1137    celecoxib capsule 100 mg  100 mg Oral Daily Caity Carballo MD   100 mg at 08/19/20 0946    clindamycin in D5W 600 mg/50 mL IVPB 600 mg  600 mg Intravenous Q8H Gerber Rohit Chowdhury  mL/hr at 08/19/20 1136 600 mg at 08/19/20 1136    diphenhydrAMINE capsule 25 mg  25 mg Oral Q6H PRN Gerber Rohit Chowdhury MD   25 mg at 08/19/20 0509    docusate sodium capsule 100 mg  100 mg Oral BID Jordy Ortega MD   100 mg at 08/19/20 0825    ergocalciferol capsule 50,000 Units  50,000 Units Oral Q7  Days Jordy Ortega MD   50,000 Units at 08/19/20 1134    lidocaine (PF) 10 mg/ml (1%) injection 10 mg  1 mL Other Once Eddie Barry MD        melatonin tablet 6 mg  6 mg Oral Nightly PRN Eddie Barry MD        methocarbamoL tablet 500 mg  500 mg Oral TID Caity Carballo MD   500 mg at 08/19/20 0946    ondansetron disintegrating tablet 8 mg  8 mg Oral Q8H PRN Eddie Barry MD   8 mg at 08/18/20 1736    oxyCODONE immediate release tablet 5 mg  5 mg Oral Q4H PRN Caity Carballo MD   5 mg at 08/19/20 1133    pregabalin capsule 50 mg  50 mg Oral QHS Caity Carballo MD        promethazine (PHENERGAN) 6.25 mg in dextrose 5 % 50 mL IVPB  6.25 mg Intravenous Q6H PRN Eddie Barry  mL/hr at 08/18/20 2120 6.25 mg at 08/18/20 2120    ropivacaine (PF) 2 mg/ml (0.2%) infusion  8 mL/hr Perineural Continuous Umer Smith MD 8 mL/hr at 08/18/20 1504 8 mL/hr at 08/18/20 1504    sodium chloride 0.9% flush 10 mL  10 mL Intravenous PRN Eddie Barry MD        sodium chloride 0.9% flush 10 mL  10 mL Intravenous PRN Eddie Barry MD        vitamin D 1000 units tablet 1,000 Units  1,000 Units Oral Daily Gerber Rohit Chowdhury MD   1,000 Units at 08/19/20 0825        Anticoagulant dose: none at this time.    Assessment:     Pain control inadequate at this time.   - Patient POD1 with 7/10 pain in back of R leg which is not covered by adductor canal catheter, bolused at bedside to cover for referred pain but did not help much   - Anterior leg with no pain at this time. Adequate coverage with PNC   - Added scheduled multi-modal pain medications this morning   - Nausea overnight controlled with Phenergan and Zofran, not nauseous at time of exam   - Patient was drowsy after 1 dose of Gabapentin this morning, will switch to Lyrica nightly   - Patient was hurting too bad to work with PT, was counseled to use PRN meds    Plan:     Modify present therapy to improve control of pain .    - Scheduled Tylenol  q8, Celebrex, Robaxin TID, Lyrica nightly   - PRN Oxy 5 as needed for breakthrough pain   - Continue Adductor Canal Catheter for adequate pain control   - Will reassess pain tomorrow and continue to follow    Evaluator Caity Carballo

## 2020-08-19 NOTE — PT/OT/SLP EVAL
Physical Therapy Evaluation    Patient Name:  Ani Montoya   MRN:  [id number]    Recommendations:     Discharge Recommendations:  outpatient PT   Discharge Equipment Recommendations: walker, rolling, bedside commode, wheelchair   Barriers to discharge: decreased functional mobility requiring increased assist      Assessment:     Ani Montoya is a 30 y.o. female admitted with a medical diagnosis of Closed bicondylar fracture of right tibial plateau.  She presents with the following impairments/functional limitations:  weakness, impaired endurance, orthopedic precautions, impaired self care skills, impaired functional mobilty, decreased lower extremity function, gait instability, impaired balance, edema, pain. Pt evaluated on this date demonstrating impairments in functional mobility d/t generalized weakness and orthopedic precautions(RLE NWB). Pt required Min-CGA for all mobility but reports good support following discharge. Pt would benefit from continued skilled acute PT daily to improve functional mobility.  Recommending pt receive PT services in OPPT setting following discharge from hospital once medically cleared.     Rehab Prognosis: Good; patient would benefit from acute skilled PT services to address these deficits and reach maximum level of function.    Recent Surgery: Procedure(s) (LRB):  ORIF, FRACTURE, TIBIA, PLATEAU (Right) 1 Day Post-Op    Plan:     During this hospitalization, patient to be seen daily to address the identified rehab impairments via gait training, therapeutic activities, therapeutic exercises, neuromuscular re-education, wheelchair management/training and progress toward the following goals:    · Plan of Care Expires:  09/18/20    Subjective     Chief Complaint: 8/10 RLE pain  Patient/Family Comments/goals: Pt pleasant and willing to participate with therapy on this date.    Pain/Comfort:  · Pain Rating 1: 8/10  · Location - Side 1: Right  · Location 1: leg    Patients cultural,  spiritual, Adventist conflicts given the current situation: no    Living Environment:  Pt lives w/figina in [facility] w/[address]. Stephanie works during the day but a friend will be staying to assist PRN.   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  Equipment used at home: none.  DME owned (not currently used): none.  Upon discharge, patient will have assistance from fiance and friend.    Objective:     Communicated with NSG prior to session.  Patient found HOB elevated with perineural catheter, SCD  upon PT entry to room.    General Precautions: Standard, fall   Orthopedic Precautions:RLE non weight bearing   Braces: N/A     Exams:  · Cognitive Exam:  Patient is oriented to Person, Place, Time and Situation  · RLE ROM: limited d/t edema   · RLE Strength: not assess d/t pain   · LLE ROM: WFL  · LLE Strength: WFL    Functional Mobility:  · Bed Mobility:     · Scooting: minimum assistance(assist w/RLE)  · Bridging: minimum assistance(assist w/RLE)  · Supine to Sit: minimum assistance(assist w/RLE)  · Transfers:     · Sit to Stand:  minimum assistance with rolling walker   · Bed to BSC: minimum assistance with  rolling walker  using  Step Transfer  · BSC to Chair: minimum assistance with  rolling walker  using  Step Transfer  · Gait: 15ft CGA w/RW utilizing 3pt hop to gait pattern. Pt maintained RLE %  · Balance: Sitting: SBA   Standing: CGA    Therapeutic Activities and Exercises:   - EOB Sitting: ~6mins SBA   - Sit-to-Stand x3 trials Min A w/RW and occasional LOB noted   - Transfer Training x2 trials Min A w/RW  - Pt educated on:   -PT roles, expectations, and POC    -Safety with mobility   -Benefits of OOB activities to increase strength and functional mobility    -Performing ther ex for increasing LE ROM and strength   -Discharge recommendations     AM-PAC 6 CLICK MOBILITY  Total Score:17     Patient left up in chair with all lines intact and call button in reach.    GOALS:    Multidisciplinary Problems     Physical Therapy Goals        Problem: Physical Therapy Goal    Goal Priority Disciplines Outcome Goal Variances Interventions   Physical Therapy Goal     PT, PT/OT Ongoing, Progressing     Description: Goals to be met by: 2020    Patient will increase functional independence with mobility by performin. Supine to sit with Modified [place]  2. Sit to supine with Modified [place]  3. Sit to stand transfer with Modified [place]  4. Gait  x 20 feet with Modified [place] using LRAD  5. Lower extremity exercise program x15 reps, with independence                      History:     Past Medical History:   Diagnosis Date    Hypopituitarism     Osteoporosis     Primary amenorrhea        Past Surgical History:   Procedure Laterality Date    breast aug Bilateral     OPEN REDUCTION AND INTERNAL FIXATION (ORIF) OF FRACTURE OF TIBIAL PLATEAU Right 2020    Procedure: ORIF, FRACTURE, TIBIA, PLATEAU;  Surgeon: Jordy Ortega MD;  Location: [facility] OR [address] [place] [address];  Service: Orthopedics;  Laterality: Right;       Time Tracking:     PT Received On: 20  PT Start Time: 0825     PT Stop Time: 0916  PT Total Time (min): 51 min     Billable Minutes: Evaluation 10, Gait Training 13 and Therapeutic Activity 28      Elroy Pop, RADHA  2020

## 2020-08-19 NOTE — PROGRESS NOTES
[facility]  Orthopedics  Progress Note    Patient Name: Ani Montoya  MRN: [id number]  Admission Date: 8/17/2020  Hospital Length of Stay: 2 days  Attending Provider: Jordy Ortega MD  Primary Care Provider: Sylvie Mabry MD  Follow-up For: Procedure(s) (LRB):  ORIF, FRACTURE, TIBIA, PLATEAU (Right)    Post-Operative Day: 1 Day Post-Op  Subjective:     Principal Problem:Closed bicondylar fracture of right tibial plateau    Principal Orthopedic Problem: s/p ORIF R tibia    Interval History: NAEON. VSS. Pain controlled. Pt ambulated 15 ft with rolling walker with PT today. No complaints at this time.     Review of patient's allergies indicates:  No Known Allergies    Current Facility-Administered Medications   Medication    0.9%  NaCl infusion    acetaminophen tablet 1,000 mg    aspirin chewable tablet 81 mg    calcium carbonate (OS-MICAH) tablet 500 mg    ceFAZolin injection 2 g    celecoxib capsule 100 mg    clindamycin in D5W 600 mg/50 mL IVPB 600 mg    diphenhydrAMINE capsule 25 mg    docusate sodium capsule 100 mg    ergocalciferol capsule 50,000 Units    lidocaine (PF) 10 mg/ml (1%) injection 10 mg    melatonin tablet 6 mg    methocarbamoL tablet 500 mg    ondansetron disintegrating tablet 8 mg    oxyCODONE immediate release tablet 5 mg    pregabalin capsule 50 mg    promethazine (PHENERGAN) 6.25 mg in dextrose 5 % 50 mL IVPB    ropivacaine (PF) 2 mg/ml (0.2%) infusion    sodium chloride 0.9% flush 10 mL    sodium chloride 0.9% flush 10 mL    vitamin D 1000 units tablet 1,000 Units     Objective:     Vital Signs (Most Recent):  Temp: 96.6 °F (35.9 °C) (08/19/20 1507)  Pulse: 80 (08/19/20 1507)  Resp: 20 (08/19/20 1507)  BP: 130/73 (08/19/20 1507)  SpO2: 99 % (08/19/20 1507) Vital Signs (24h Range):  Temp:  [96.6 °F (35.9 °C)-98.5 °F (36.9 °C)] 96.6 °F (35.9 °C)  Pulse:  [64-80] 80  Resp:  [16-20] 20  SpO2:  [97 %-100 %] 99 %  BP: ()/(60-73) 130/73  "    Weight: 65.7 kg (144 lb 13.5 oz)  Height: 4' 11" (149.9 cm)  Body mass index is 29.25 kg/m².      Intake/Output Summary (Last 24 hours) at 8/19/2020 1643  Last data filed at 8/19/2020 1400  Gross per 24 hour   Intake 860 ml   Output 3277 ml   Net -2417 ml       Ortho/SPM Exam     Awake/alert/oriented x3, No acute distress, Afebrile, Vital signs stable  Good inspiratory effort with unlaboured breathing  Dressings c/d/i  NVI in operative limb       Significant Labs: All pertinent labs within the past 24 hours have been reviewed.    Significant Imaging: I have reviewed all pertinent imaging results/findings.    Assessment/Plan:     * Closed bicondylar fracture of right tibial plateau  Ani Montoya is a 30 y.o. female with R tibial plateau fracture s/p ORIF R tibia       Pain control: PNC, multimodal  PT/OT: NWB RLE; gentle ROM ok  DVT PPx: ASA 81 BID, SCDs at all times when not ambulating  Abx: postop Ancef/ Clinda  Labs: stable  Rodriguez: dced    Dispo: f/u PT recs            Gerber Chowdhury MD  Orthopedics  [facility]  "

## 2020-08-19 NOTE — PLAN OF CARE
08/19/20 0917   Post-Acute Status   Post-Acute Authorization Home Health;Placement   Home Health Status Awaiting Therapy Documentation

## 2020-08-19 NOTE — SUBJECTIVE & OBJECTIVE
"Principal Problem:Closed bicondylar fracture of right tibial plateau    Principal Orthopedic Problem: s/p ORIF R tibia    Interval History: NAEON. VSS. Pain controlled. Pt ambulated 15 ft with rolling walker with PT today. No complaints at this time.     Review of patient's allergies indicates:  No Known Allergies    Current Facility-Administered Medications   Medication    0.9%  NaCl infusion    acetaminophen tablet 1,000 mg    aspirin chewable tablet 81 mg    calcium carbonate (OS-MICAH) tablet 500 mg    ceFAZolin injection 2 g    celecoxib capsule 100 mg    clindamycin in D5W 600 mg/50 mL IVPB 600 mg    diphenhydrAMINE capsule 25 mg    docusate sodium capsule 100 mg    ergocalciferol capsule 50,000 Units    lidocaine (PF) 10 mg/ml (1%) injection 10 mg    melatonin tablet 6 mg    methocarbamoL tablet 500 mg    ondansetron disintegrating tablet 8 mg    oxyCODONE immediate release tablet 5 mg    pregabalin capsule 50 mg    promethazine (PHENERGAN) 6.25 mg in dextrose 5 % 50 mL IVPB    ropivacaine (PF) 2 mg/ml (0.2%) infusion    sodium chloride 0.9% flush 10 mL    sodium chloride 0.9% flush 10 mL    vitamin D 1000 units tablet 1,000 Units     Objective:     Vital Signs (Most Recent):  Temp: 96.6 °F (35.9 °C) (08/19/20 1507)  Pulse: 80 (08/19/20 1507)  Resp: 20 (08/19/20 1507)  BP: 130/73 (08/19/20 1507)  SpO2: 99 % (08/19/20 1507) Vital Signs (24h Range):  Temp:  [96.6 °F (35.9 °C)-98.5 °F (36.9 °C)] 96.6 °F (35.9 °C)  Pulse:  [64-80] 80  Resp:  [16-20] 20  SpO2:  [97 %-100 %] 99 %  BP: ()/(60-73) 130/73     Weight: 65.7 kg (144 lb 13.5 oz)  Height: 4' 11" (149.9 cm)  Body mass index is 29.25 kg/m².      Intake/Output Summary (Last 24 hours) at 8/19/2020 1643  Last data filed at 8/19/2020 1400  Gross per 24 hour   Intake 860 ml   Output 3277 ml   Net -2417 ml       Ortho/SPM Exam     Awake/alert/oriented x3, No acute distress, Afebrile, Vital signs stable  Good inspiratory effort with " unlaboured breathing  Dressings c/d/i  NVI in operative limb       Significant Labs: All pertinent labs within the past 24 hours have been reviewed.    Significant Imaging: I have reviewed all pertinent imaging results/findings.

## 2020-08-19 NOTE — ASSESSMENT & PLAN NOTE
Ani Montoya is a 30 y.o. female with R tibial plateau fracture s/p ORIF R tibia       Pain control: PNC, multimodal  PT/OT: NWB RLE; gentle ROM ok  DVT PPx: ASA 81 BID, SCDs at all times when not ambulating  Abx: postop Ancef/ Clinda  Labs: stable  Rodriguez: dced    Dispo: f/u PT recs

## 2020-08-19 NOTE — PLAN OF CARE
Lindaal completed and POC established     Iftikhar Pop, PT, DPT  2020       Problem: Physical Therapy Goal  Goal: Physical Therapy Goal  Description: Goals to be met by: 2020    Patient will increase functional independence with mobility by performin. Supine to sit with Modified [place]  2. Sit to supine with Modified [place]  3. Sit to stand transfer with Modified [place]  4. Gait  x 20 feet with Modified [place] using LRAD  5. Lower extremity exercise program x15 reps, with independence     Outcome: Ongoing, Progressing

## 2020-08-19 NOTE — PLAN OF CARE
08/19/20 1527   Post-Acute Status   Post-Acute Authorization Other   Other Status No Post-Acute Service Needs     O/P therapy recommended, Sw to follow with post dc needs.

## 2020-08-20 PROCEDURE — 63600175 PHARM REV CODE 636 W HCPCS: Performed by: STUDENT IN AN ORGANIZED HEALTH CARE EDUCATION/TRAINING PROGRAM

## 2020-08-20 PROCEDURE — 25000003 PHARM REV CODE 250: Performed by: ORTHOPAEDIC SURGERY

## 2020-08-20 PROCEDURE — 99231 SBSQ HOSP IP/OBS SF/LOW 25: CPT | Mod: ,,, | Performed by: ANESTHESIOLOGY

## 2020-08-20 PROCEDURE — 99231 PR SUBSEQUENT HOSPITAL CARE,LEVL I: ICD-10-PCS | Mod: ,,, | Performed by: ANESTHESIOLOGY

## 2020-08-20 PROCEDURE — 97535 SELF CARE MNGMENT TRAINING: CPT

## 2020-08-20 PROCEDURE — 25000003 PHARM REV CODE 250: Performed by: STUDENT IN AN ORGANIZED HEALTH CARE EDUCATION/TRAINING PROGRAM

## 2020-08-20 PROCEDURE — 97110 THERAPEUTIC EXERCISES: CPT | Mod: CQ

## 2020-08-20 PROCEDURE — 11000001 HC ACUTE MED/SURG PRIVATE ROOM

## 2020-08-20 PROCEDURE — 97116 GAIT TRAINING THERAPY: CPT | Mod: CQ

## 2020-08-20 RX ADMIN — ACETAMINOPHEN 1000 MG: 500 TABLET ORAL at 02:08

## 2020-08-20 RX ADMIN — CELECOXIB 100 MG: 100 CAPSULE ORAL at 08:08

## 2020-08-20 RX ADMIN — POLYETHYLENE GLYCOL 3350 17 G: 17 POWDER, FOR SOLUTION ORAL at 09:08

## 2020-08-20 RX ADMIN — METHOCARBAMOL 500 MG: 500 TABLET ORAL at 08:08

## 2020-08-20 RX ADMIN — DOCUSATE SODIUM 100 MG: 100 CAPSULE, LIQUID FILLED ORAL at 08:08

## 2020-08-20 RX ADMIN — OXYCODONE 5 MG: 5 TABLET ORAL at 09:08

## 2020-08-20 RX ADMIN — ACETAMINOPHEN 1000 MG: 500 TABLET ORAL at 06:08

## 2020-08-20 RX ADMIN — METHOCARBAMOL 500 MG: 500 TABLET ORAL at 09:08

## 2020-08-20 RX ADMIN — DOCUSATE SODIUM 100 MG: 100 CAPSULE, LIQUID FILLED ORAL at 09:08

## 2020-08-20 RX ADMIN — ACETAMINOPHEN 1000 MG: 500 TABLET ORAL at 10:08

## 2020-08-20 RX ADMIN — VITAMIN D, TAB 1000IU (100/BT) 1000 UNITS: 25 TAB at 08:08

## 2020-08-20 RX ADMIN — METHOCARBAMOL 500 MG: 500 TABLET ORAL at 02:08

## 2020-08-20 RX ADMIN — CALCIUM 500 MG: 500 TABLET ORAL at 08:08

## 2020-08-20 RX ADMIN — ROPIVACAINE HYDROCHLORIDE 8 ML/HR: 2 INJECTION, SOLUTION EPIDURAL; INFILTRATION at 06:08

## 2020-08-20 RX ADMIN — ASPIRIN 81 MG CHEWABLE TABLET 81 MG: 81 TABLET CHEWABLE at 08:08

## 2020-08-20 RX ADMIN — PREGABALIN 50 MG: 50 CAPSULE ORAL at 09:08

## 2020-08-20 RX ADMIN — CALCIUM 500 MG: 500 TABLET ORAL at 09:08

## 2020-08-20 RX ADMIN — ASPIRIN 81 MG CHEWABLE TABLET 81 MG: 81 TABLET CHEWABLE at 09:08

## 2020-08-20 NOTE — PHYSICIAN QUERY
"Name: Ani Montoya  MR #: [id number]    Musculoskeletal Clarification       CDS/: Millicent Livingston RN               Contact information: [email or web address]    This form is a permanent document in the medical record.    Query Date: August 20, 2020    By submitting this query, we are merely seeking further clarification of documentation. Please utilize your independent clinical judgment when addressing the question(s) below.    The medical record contains the following:   Indicators Supporting Clinical Findings Location in Medical Record   x "Fracture" documented Closed fracture of right tibial plateau Orthopedic surgery consult   x Chronic conditions (Osteoporosis, Malignancy, etc.) Osteoporosis (on hormones and has osteopenia on most recent DEXA) , hypopituitarism, Orthopedic surgery consult   x Radiology Findings 1. No evidence of an acute vascular injury of the right lower extremity.  2. Mildly displaced and comminuted proximal tibial fracture with a large right lipohemarthrosis.    There is generalized osteopenia, advanced for age CTA lower extremity          R knee imaging 8/17   x Treatment Does not take calcium or vitamin-D    Procedure: Open reduction internal fixation right bicondylar tibial plateau fracture     Vitamin D 25-hydroxy 12 Orthopedic surgery consult    OP note 8/18      Labs 8/18   x Other Fell from standing while walking the dog Orthopedic surgery consult     Provider, please specify the etiology and/or type of fracture:      Traumatic Fracture (please specify type):  [   ] Closed Fracture due to trauma       Non-Traumatic Fracture (please specify type):  [   ] Pathological fracture   [  x ] Osteoporotic       [   ] Other Musculoskeletal Diagnosis (please specify): _______   [  ] Clinically Undetermined      Present on admission (POA) status:   [  x ] Yes (Y)                          [  ] Clinically Undetermined (W)    [   ] No (N)                            [   ] Documentation " insufficient to determine if condition is POA (U)     Please document in your progress notes daily for the duration of treatment, until resolved, and include in your discharge summary.                                                                                                        As I have said many times it is both a pathologic osteoporotic fracture and a traumatic fracture.  The bone is weak be used only the trauma to break it.

## 2020-08-20 NOTE — PROGRESS NOTES
"[facility]  Orthopedics  Progress Note    Patient Name: Ani Montoya  MRN: [id number]  Admission Date: 8/17/2020  Hospital Length of Stay: 3 days  Attending Provider: Jordy Ortega MD  Primary Care Provider: Sylvie Mabry MD  Follow-up For: Procedure(s) (LRB):  ORIF, FRACTURE, TIBIA, PLATEAU (Right)    Post-Operative Day: 2 Days Post-Op  Subjective:     Principal Problem:Closed bicondylar fracture of right tibial plateau    Principal Orthopedic Problem: s/p ORIF R tibia    Interval History: NAEON. VSS. Pain controlled. Pt ambulated 70 ft with rolling walker with PT today.     Review of patient's allergies indicates:  No Known Allergies    Current Facility-Administered Medications   Medication    0.9%  NaCl infusion    acetaminophen tablet 1,000 mg    aspirin chewable tablet 81 mg    calcium carbonate (OS-MICAH) tablet 500 mg    celecoxib capsule 100 mg    diphenhydrAMINE capsule 25 mg    docusate sodium capsule 100 mg    ergocalciferol capsule 50,000 Units    lidocaine (PF) 10 mg/ml (1%) injection 10 mg    melatonin tablet 6 mg    methocarbamoL tablet 500 mg    ondansetron disintegrating tablet 8 mg    oxyCODONE immediate release tablet 5 mg    polyethylene glycol packet 17 g    pregabalin capsule 50 mg    promethazine (PHENERGAN) 6.25 mg in dextrose 5 % 50 mL IVPB    ropivacaine (PF) 2 mg/ml (0.2%) infusion    sodium chloride 0.9% flush 10 mL    sodium chloride 0.9% flush 10 mL    vitamin D 1000 units tablet 1,000 Units     Objective:     Vital Signs (Most Recent):  Temp: 98.6 °F (37 °C) (08/20/20 1238)  Pulse: 74 (08/20/20 1238)  Resp: 16 (08/20/20 1238)  BP: 125/83 (08/20/20 1238)  SpO2: 100 % (08/20/20 1238) Vital Signs (24h Range):  Temp:  [96.6 °F (35.9 °C)-98.9 °F (37.2 °C)] 98.6 °F (37 °C)  Pulse:  [63-82] 74  Resp:  [16-20] 16  SpO2:  [98 %-100 %] 100 %  BP: (101-130)/(57-83) 125/83     Weight: 65.7 kg (144 lb 13.5 oz)  Height: 4' 11" (149.9 cm)  Body mass " index is 29.25 kg/m².    No intake or output data in the 24 hours ending 08/20/20 1423    Ortho/SPM Exam     Awake/alert/oriented x3, No acute distress, Afebrile, Vital signs stable  Good inspiratory effort with unlaboured breathing  Dressings c/d/i  NVI in operative limb       Significant Labs: All pertinent labs within the past 24 hours have been reviewed.    Significant Imaging: I have reviewed all pertinent imaging results/findings.    Assessment/Plan:     * Closed bicondylar fracture of right tibial plateau  Ani Montyoa is a 30 y.o. female with R tibial plateau fracture s/p ORIF R tibia on 8.18.20. Discussed patients osteoporosis in detail with endocrine fellow. They do support 50,000 U vitamin D weekly and calcium 500mg BID for this pt. They would not like to see her while in-house as she will not be able to start an osteoporosis medication during this initial healing phase of the fracture. They are aware of her elevated PTH level and will order new labs for her 1 week prior to her visit in endocrine clinic on 9.22.20.      Pain control: PNC, multimodal  PT/OT: NWB RLE; gentle ROM as tolerated  DVT PPx: ASA 81 BID, SCDs at all times when not ambulating  Abx: postop Ancef/ Clinda complete  Labs: stable  Rodriguez: dced    Dispo: f/u PT recs            Gerber Chowdhury MD  Orthopedics  [facility]

## 2020-08-20 NOTE — SUBJECTIVE & OBJECTIVE
"Principal Problem:Closed bicondylar fracture of right tibial plateau    Principal Orthopedic Problem: s/p ORIF R tibia    Interval History: NAEON. VSS. Pain controlled. Pt ambulated 70 ft with rolling walker with PT today.     Review of patient's allergies indicates:  No Known Allergies    Current Facility-Administered Medications   Medication    0.9%  NaCl infusion    acetaminophen tablet 1,000 mg    aspirin chewable tablet 81 mg    calcium carbonate (OS-MICAH) tablet 500 mg    celecoxib capsule 100 mg    diphenhydrAMINE capsule 25 mg    docusate sodium capsule 100 mg    ergocalciferol capsule 50,000 Units    lidocaine (PF) 10 mg/ml (1%) injection 10 mg    melatonin tablet 6 mg    methocarbamoL tablet 500 mg    ondansetron disintegrating tablet 8 mg    oxyCODONE immediate release tablet 5 mg    polyethylene glycol packet 17 g    pregabalin capsule 50 mg    promethazine (PHENERGAN) 6.25 mg in dextrose 5 % 50 mL IVPB    ropivacaine (PF) 2 mg/ml (0.2%) infusion    sodium chloride 0.9% flush 10 mL    sodium chloride 0.9% flush 10 mL    vitamin D 1000 units tablet 1,000 Units     Objective:     Vital Signs (Most Recent):  Temp: 98.6 °F (37 °C) (08/20/20 1238)  Pulse: 74 (08/20/20 1238)  Resp: 16 (08/20/20 1238)  BP: 125/83 (08/20/20 1238)  SpO2: 100 % (08/20/20 1238) Vital Signs (24h Range):  Temp:  [96.6 °F (35.9 °C)-98.9 °F (37.2 °C)] 98.6 °F (37 °C)  Pulse:  [63-82] 74  Resp:  [16-20] 16  SpO2:  [98 %-100 %] 100 %  BP: (101-130)/(57-83) 125/83     Weight: 65.7 kg (144 lb 13.5 oz)  Height: 4' 11" (149.9 cm)  Body mass index is 29.25 kg/m².    No intake or output data in the 24 hours ending 08/20/20 1423    Ortho/SPM Exam     Awake/alert/oriented x3, No acute distress, Afebrile, Vital signs stable  Good inspiratory effort with unlaboured breathing  Dressings c/d/i  NVI in operative limb       Significant Labs: All pertinent labs within the past 24 hours have been reviewed.    Significant Imaging: I " have reviewed all pertinent imaging results/findings.

## 2020-08-20 NOTE — ADDENDUM NOTE
Addendum  created 08/20/20 1436 by Candido Mota MD    Charge Capture section accepted, Cosign clinical note with attestation

## 2020-08-20 NOTE — ANESTHESIA POST-OP PAIN MANAGEMENT
Acute Pain Service Progress Note    Ani Montoya is a 30 y.o., female, [id number]. PMH hypopituitarism, osteoporosis, primary amenorrhea, s/p fall on R knee presenting with tibial plateau fracture.     Surgery:  R ORIF, FRACTURE, TIBIA, PLATEAU    Post Op Day #: 2    Catheter type: perineural  R adductor canal    Infusion type: Ropivacaine 0.2%  8mL/hr basal    Problem List:    Active Hospital Problems    Diagnosis  POA    *Closed bicondylar fracture of right tibial plateau [S82.141A]  Yes      Resolved Hospital Problems   No resolved problems to display.       Subjective:     General appearance of alert, oriented, complains of pain in back of R leg at rest and upon movement. Denies n/v, HA, or other complaints at this time.   Pain with rest: 6    Numbers   Pain with movement: 8    Numbers   Side Effects    1. Pruritis No    2. Nausea No    3. Motor Blockade No    4. Sedation No, 1=awake and alert    Objective:     Alert orientedx3   R leg incision with dressing, CDI, no signs of infection   Catheter site clean, dry, intact      Vitals   Vitals:    08/20/20 0817   BP: 124/78   Pulse: 68   Resp: 16   Temp: 36.9 °C (98.5 °F)        Labs    Admission on 08/17/2020   Component Date Value Ref Range Status    Hemoglobin A1C 08/17/2020 5.0  4.0 - 5.6 % Final    Estimated Avg Glucose 08/17/2020 97  68 - 131 mg/dL Final    Magnesium 08/17/2020 1.8  1.6 - 2.6 mg/dL Final    Prealbumin 08/17/2020 25  20 - 43 mg/dL Final    Transferrin 08/17/2020 237  200 - 375 mg/dL Final    Group & Rh 08/17/2020 O POS   Final    Indirect Isaac 08/17/2020 NEG   Final    Specimen UA 08/18/2020 Urine, Clean Catch   Final    Color, UA 08/18/2020 Straw  Yellow, Straw, Mely Final    Appearance, UA 08/18/2020 Clear  Clear Final    pH, UA 08/18/2020 7.0  5.0 - 8.0 Final    Specific Mouthcard, UA 08/18/2020 1.030  1.005 - 1.030 Final    Protein, UA 08/18/2020 Negative  Negative Final    Glucose, UA 08/18/2020 Negative  Negative Final     Ketones, UA 08/18/2020 Negative  Negative Final    Bilirubin (UA) 08/18/2020 Negative  Negative Final    Occult Blood UA 08/18/2020 Negative  Negative Final    Nitrite, UA 08/18/2020 Negative  Negative Final    Leukocytes, UA 08/18/2020 Negative  Negative Final    Prothrombin Time 08/17/2020 11.3  9.0 - 12.5 sec Final    INR 08/17/2020 1.0  0.8 - 1.2 Final    aPTT 08/17/2020 21.7  21.0 - 32.0 sec Final    PTH, Intact 08/18/2020 218.0* 9.0 - 77.0 pg/mL Final    Vit D, 25-Hydroxy 08/18/2020 12* 30 - 96 ng/mL Final    TSH 08/18/2020 0.785  0.400 - 4.000 uIU/mL Final    Free T4 08/18/2020 0.89  0.71 - 1.51 ng/dL Final    Phosphorus 08/18/2020 3.0  2.7 - 4.5 mg/dL Final    WBC 08/19/2020 10.95  3.90 - 12.70 K/uL Final    RBC 08/19/2020 3.86* 4.00 - 5.40 M/uL Final    Hemoglobin 08/19/2020 11.8* 12.0 - 16.0 g/dL Final    Hematocrit 08/19/2020 37.5  37.0 - 48.5 % Final    Mean Corpuscular Volume 08/19/2020 97  82 - 98 fL Final    Mean Corpuscular Hemoglobin 08/19/2020 30.6  27.0 - 31.0 pg Final    Mean Corpuscular Hemoglobin Conc 08/19/2020 31.5* 32.0 - 36.0 g/dL Final    RDW 08/19/2020 13.5  11.5 - 14.5 % Final    Platelets 08/19/2020 158  150 - 350 K/uL Final    MPV 08/19/2020 11.3  9.2 - 12.9 fL Final    Immature Granulocytes 08/19/2020 0.6* 0.0 - 0.5 % Final    Gran # (ANC) 08/19/2020 8.5* 1.8 - 7.7 K/uL Final    Immature Grans (Abs) 08/19/2020 0.07* 0.00 - 0.04 K/uL Final    Lymph # 08/19/2020 1.4  1.0 - 4.8 K/uL Final    Mono # 08/19/2020 1.0  0.3 - 1.0 K/uL Final    Eos # 08/19/2020 0.0  0.0 - 0.5 K/uL Final    Baso # 08/19/2020 0.02  0.00 - 0.20 K/uL Final    nRBC 08/19/2020 0  0 /100 WBC Final    Gran% 08/19/2020 77.2* 38.0 - 73.0 % Final    Lymph% 08/19/2020 12.8* 18.0 - 48.0 % Final    Mono% 08/19/2020 8.9  4.0 - 15.0 % Final    Eosinophil% 08/19/2020 0.3  0.0 - 8.0 % Final    Basophil% 08/19/2020 0.2  0.0 - 1.9 % Final    Differential Method 08/19/2020 Automated   Final     Sodium 08/19/2020 138  136 - 145 mmol/L Final    Potassium 08/19/2020 3.7  3.5 - 5.1 mmol/L Final    Chloride 08/19/2020 103  95 - 110 mmol/L Final    CO2 08/19/2020 26  23 - 29 mmol/L Final    Glucose 08/19/2020 93  70 - 110 mg/dL Final    BUN, Bld 08/19/2020 10  6 - 20 mg/dL Final    Creatinine 08/19/2020 0.8  0.5 - 1.4 mg/dL Final    Calcium 08/19/2020 9.2  8.7 - 10.5 mg/dL Final    Total Protein 08/19/2020 7.6  6.0 - 8.4 g/dL Final    Albumin 08/19/2020 3.6  3.5 - 5.2 g/dL Final    Total Bilirubin 08/19/2020 0.2  0.1 - 1.0 mg/dL Final    Alkaline Phosphatase 08/19/2020 90  55 - 135 U/L Final    AST 08/19/2020 21  10 - 40 U/L Final    ALT 08/19/2020 11  10 - 44 U/L Final    Anion Gap 08/19/2020 9  8 - 16 mmol/L Final    eGFR if African American 08/19/2020 >60.0  >60 mL/min/1.73 m^2 Final    eGFR if non African American 08/19/2020 >60.0  >60 mL/min/1.73 m^2 Final        Meds   Current Facility-Administered Medications   Medication Dose Route Frequency Provider Last Rate Last Dose    0.9%  NaCl infusion   Intravenous Continuous Eddie Barry MD   Stopped at 08/18/20 1158    acetaminophen tablet 1,000 mg  1,000 mg Oral Q8H Caity Carballo MD   1,000 mg at 08/20/20 0639    aspirin chewable tablet 81 mg  81 mg Oral BID Gerber Chowdhury MD   81 mg at 08/20/20 0826    calcium carbonate (OS-MICAH) tablet 500 mg  500 mg Oral BID Gerber Chowdhury MD   500 mg at 08/20/20 0826    celecoxib capsule 100 mg  100 mg Oral Daily Caity Carballo MD   100 mg at 08/20/20 0827    diphenhydrAMINE capsule 25 mg  25 mg Oral Q6H PRN Gerber Chowdhury MD   25 mg at 08/19/20 0509    docusate sodium capsule 100 mg  100 mg Oral BID Jordy Ortega MD   100 mg at 08/20/20 0827    ergocalciferol capsule 50,000 Units  50,000 Units Oral Q7 Days Jordy Ortega MD   50,000 Units at 08/19/20 1134    lidocaine (PF) 10 mg/ml (1%) injection 10 mg  1 mL Other Once Eddie Barry MD        melatonin tablet 6 mg  6  mg Oral Nightly PRN Eddie Barry MD        methocarbamoL tablet 500 mg  500 mg Oral TID Caity Carballo MD   500 mg at 08/20/20 0827    ondansetron disintegrating tablet 8 mg  8 mg Oral Q8H PRN Eddie Barry MD   8 mg at 08/18/20 1736    oxyCODONE immediate release tablet 5 mg  5 mg Oral Q4H PRN Caity Carballo MD   5 mg at 08/19/20 2216    polyethylene glycol packet 17 g  17 g Oral BID Jordy Ortega MD   17 g at 08/19/20 2216    pregabalin capsule 50 mg  50 mg Oral QHS Caity Carballo MD   50 mg at 08/19/20 2209    promethazine (PHENERGAN) 6.25 mg in dextrose 5 % 50 mL IVPB  6.25 mg Intravenous Q6H PRN Eddie Barry  mL/hr at 08/18/20 2120 6.25 mg at 08/18/20 2120    ropivacaine (PF) 2 mg/ml (0.2%) infusion  8 mL/hr Perineural Continuous Umer Smith MD 8 mL/hr at 08/19/20 1830 8 mL/hr at 08/19/20 1830    sodium chloride 0.9% flush 10 mL  10 mL Intravenous PRN Eddie Barry MD        sodium chloride 0.9% flush 10 mL  10 mL Intravenous PRN Eddie Barry MD        vitamin D 1000 units tablet 1,000 Units  1,000 Units Oral Daily Gerber Rohit Chowdhury MD   1,000 Units at 08/20/20 0826        Anticoagulant dose: ASA 81 BID. SCD in place.    Assessment:     Pain control inadequate with current pain regimen, patient trying to avoid opioids   - Persistent R back leg pain, this area not covered by PNC   - On multi-modals and PRN Oxy to control this pain   - Last Oxy 5 required at 10pm yesterday, patient trying to avoid oioids   - Anterior leg / incisional pain adequately controlled with PNC   - Dispo pending PT recs: d/c to home with outpatient therapy when medically ready    Plan:     Patient doing well, continue present treatment.    - Scheduled Tylenol q8, Celebrex, Robaxin TID, Lyrica nightly              - PRN Oxy 5 as needed for breakthrough pain              - Continue Adductor Canal Catheter for adequate pain control   - Consider discharge with On-Q if decide primary decides to  d/c soon   - Patient will coordinate someone to be home with her full time              - Will reassess pain and continue to follow    Evaluator Caity Carballo

## 2020-08-20 NOTE — ADDENDUM NOTE
Addendum  created 08/20/20 1030 by Caity Carballo MD    Clinical Note Signed, Pend clinical note

## 2020-08-20 NOTE — PLAN OF CARE
08/20/20 0832   Post-Acute Status   Post-Acute Authorization Other   Other Status No Post-Acute Service Needs

## 2020-08-20 NOTE — ASSESSMENT & PLAN NOTE
Ani Montoya is a 30 y.o. female with R tibial plateau fracture s/p ORIF R tibia on 8.18.20. Discussed patients osteoporosis in detail with endocrine fellow. They do support 50,000 U vitamin D weekly and calcium 500mg BID for this pt. They would not like to see her while in-house as she will not be able to start an osteoporosis medication during this initial healing phase of the fracture. They are aware of her elevated PTH level and will order new labs for her 1 week prior to her visit in endocrine clinic on 9.22.20.      Pain control: PNC, multimodal  PT/OT: NWB RLE; gentle ROM as tolerated  DVT PPx: ASA 81 BID, SCDs at all times when not ambulating  Abx: postop Ancef/ Clinda complete  Labs: stable  Jennifer: dced    Dispo: f/u PT recs

## 2020-08-20 NOTE — PT/OT/SLP PROGRESS
"Physical Therapy Treatment    Patient Name:  Ani Montoya   MRN:  [id number]    Recommendations:     Discharge Recommendations:  outpatient PT   Discharge Equipment Recommendations: walker, rolling, bedside commode, wheelchair   Barriers to discharge: None    Assessment:     Ani Montoya is a 30 y.o. female admitted with a medical diagnosis of Closed bicondylar fracture of right tibial plateau.  She presents with the following impairments/functional limitations:  weakness, impaired endurance, impaired functional mobilty, gait instability, orthopedic precautions, decreased ROM, pain. Good progress with mobility, safe, no LOB, was able to ambulate apprx 70 ft with RW maintaining NWB RLE.     Rehab Prognosis: Good; patient would benefit from acute skilled PT services to address these deficits and reach maximum level of function.    Recent Surgery: Procedure(s) (LRB):  ORIF, FRACTURE, TIBIA, PLATEAU (Right) 2 Days Post-Op    Plan:     During this hospitalization, patient to be seen daily to address the identified rehab impairments via gait training, therapeutic activities, therapeutic exercises and progress toward the following goals:    · Plan of Care Expires:  09/18/20    Subjective     Chief Complaint: "I can't believe that I'll have to be NWB for 12 weeks."  Patient/Family Comments/goals: to be able to walk and go home  Pain/Comfort:  · Pain Rating 1: 6/10  · Location - Side 1: Right  · Location 1: leg  · Pain Addressed 1: Distraction      Objective:     Communicated with nurse prior to session.  Patient found supine with perineural catheter, SCD upon PT entry to room.     General Precautions: Standard, fall   Orthopedic Precautions:RLE non weight bearing   Braces: N/A     Functional Mobility:  · Bed Mobility:     · Supine to Sit: modified independence  · Transfers:     · Sit to Stand:  stand by assistance and contact guard assistance with rolling walker  · Gait: 70 ft with RW, NWB RLE with CGA. no LOB. 2 " standing rests.       AM-PAC 6 CLICK MOBILITY  Sitting down on and standing up from a chair with arms (e.g., wheelchair, bedside commode, etc.): 4  Moving from lying on back to sitting on the side of the bed?: 4  Moving to and from a bed to a chair (including a wheelchair)?: 4  Need to walk in hospital room?: 3  Climbing 3-5 steps with a railing?: 2       Therapeutic Activities and Exercises:   white board updated  Instr patient with ROM exercises for RLE including: seated LAQ,AP (both through little range), qs,gs, supine slr,hip abd all within pain tolerance. instr to perform 3 times daily increasing reps at tolerated, H/O provided. Good understanding.     Patient left up in chair with all lines intact, call button in reach, nurse present and SCDs donned with RLE elevated..    GOALS:   Multidisciplinary Problems     Physical Therapy Goals        Problem: Physical Therapy Goal    Goal Priority Disciplines Outcome Goal Variances Interventions   Physical Therapy Goal     PT, PT/OT Ongoing, Progressing     Description: Goals to be met by: 2020    Patient will increase functional independence with mobility by performin. Supine to sit with Modified Posey  2. Sit to supine with Modified Posey  3. Sit to stand transfer with Modified Posey  4. Gait  x 20 feet with Modified Posey using LRAD  5. Lower extremity exercise program x15 reps, with independence                      Time Tracking:     PT Received On: 20  PT Start Time: 927     PT Stop Time: 956  PT Total Time (min): 29 min     Billable Minutes: Gait Training 15 and Therapeutic Exercise 14    Treatment Type: Treatment  PT/PTA: PTA     PTA Visit Number: 1     Shandra Orellana PTA  2020

## 2020-08-20 NOTE — PT/OT/SLP PROGRESS
Occupational Therapy   Treatment    Name: Ani Montoya  MRN: [id number]  Admitting Diagnosis:  Closed bicondylar fracture of right tibial plateau  2 Days Post-Op    Recommendations:     Discharge Recommendations: outpatient PT  Discharge Equipment Recommendations:  walker, rolling, bedside commode, wheelchair  Barriers to discharge:  (Decreased functional mobility and increased level of assistance)    Assessment:     Ani Montoya is a 30 y.o. female with a medical diagnosis of Closed bicondylar fracture of right tibial plateau.  Performance deficits affecting function are weakness, impaired endurance, impaired self care skills, impaired functional mobilty, gait instability, impaired balance, orthopedic precautions, decreased ROM, pain. Patient would benefit from continued skilled acute OT services to improve functional mobility, increase independence with ADLs, and address established goals.     Rehab Prognosis:  Good; patient would benefit from acute skilled OT services to address these deficits and reach maximum level of function.       Plan:     Patient to be seen daily to address the above listed problems via self-care/home management, therapeutic activities, therapeutic exercises  · Plan of Care Expires: 09/18/20  · Plan of Care Reviewed with: patient    Subjective     Pain/Comfort:  · Pain Rating 1: 6/10  · Location - Side 1: Right  · Location - Orientation 1: generalized  · Location 1: leg  · Pain Addressed 1: Reposition, Distraction  · Pain Rating Post-Intervention 1: 6/10    Objective:     Communicated with: NSG prior to session.  Patient found up in chair with perineural catheter, SCD upon OT entry to room.    General Precautions: Standard, fall   Orthopedic Precautions:RLE non weight bearing   Braces: N/A     Occupational Performance:     Functional Mobility/Transfers:  · Patient completed Sit <> Stand Transfer with SBA<>contact guard assistance  with  rolling walker   · Patient completed Toilet  Transfer bed<>toilet with functional ambulation technique with stand by assistance<>contact guard assistance with  rolling walker    Activities of Daily Living:  · Grooming: contact guard assistance standing at sink to apply makeup and washing hands while working on standing balance.  · Toileting: contact guard assistance      [facility] 6 Click ADL: 18    Treatment & Education:  Role of OT and POC  Safety  ADL retraining  Functional mobility training    Patient left up in chair with call button in reach and all needs met. Education:      GOALS:   Multidisciplinary Problems     Occupational Therapy Goals        Problem: Occupational Therapy Goal    Goal Priority Disciplines Outcome Interventions   Occupational Therapy Goal     OT, PT/OT Ongoing, Progressing    Description: Goals to be met by: 9/19/2020     Patient will increase functional independence with ADLs by performing:    UE Dressing with Stand-by Assistance.  LE Dressing with Stand-by Assistance.  Grooming while standing at sink with Stand-by Assistance.   Toileting from bedside commode with Stand-by Assistance for hygiene and clothing management.   Toilet transfer to bedside commode with Stand-by Assistance.                     Time Tracking:     OT Date of Treatment: 08/20/20  OT Start Time: 1110  OT Stop Time: 1133  OT Total Time (min): 23 min    Billable Minutes:Self Care/Home Management 23    ROSEMARIE Sotelo  8/20/2020     None

## 2020-08-20 NOTE — PLAN OF CARE
Pt, when medically ready, will be d/c to home with outpatient therapy per recommendations. CM will order equipment as noted in PT recs. Will cont to follow.     08/20/20 1101   Discharge Reassessment   Assessment Type Discharge Planning Reassessment   Provided patient/caregiver education on the expected discharge date and the discharge plan Yes   Do you have any problems affording any of your prescribed medications? No   Discharge Plan A Home   Discharge Plan B Home with family   DME Needed Upon Discharge  walker, rolling;commode;wheelchair   Anticipated Discharge Disposition Home   Can the patient/caregiver answer the patient profile reliably? Yes, cognitively intact   How does the patient rate their overall health at the present time? Fair   Describe the patient's ability to walk at the present time. Walks with the help of equipment   How often would a person be available to care for the patient? Often   Number of comorbid conditions (as recorded on the chart) Two   Post-Acute Status   Post-Acute Authorization [facility] Status Awaiting Internal medical Clearance   Discharge Delays None known at this time   Millicent Menezes, MSN  Case Management  Ext [phone]

## 2020-08-20 NOTE — PLAN OF CARE
Problem: Occupational Therapy Goal  Goal: Occupational Therapy Goal  Description: Goals to be met by: 9/19/2020     Patient will increase functional independence with ADLs by performing:    UE Dressing with Stand-by Assistance.  LE Dressing with Stand-by Assistance.  Grooming while standing at sink with Stand-by Assistance.   Toileting from bedside commode with Stand-by Assistance for hygiene and clothing management.   Toilet transfer to bedside commode with Stand-by Assistance.    Outcome: Ongoing, Progressing   Patient's goals are appropriate.   ROSEMARIE Sotelo  8/20/2020

## 2020-08-21 VITALS
BODY MASS INDEX: 29.19 KG/M2 | HEIGHT: 59 IN | DIASTOLIC BLOOD PRESSURE: 82 MMHG | OXYGEN SATURATION: 96 % | SYSTOLIC BLOOD PRESSURE: 131 MMHG | TEMPERATURE: 99 F | RESPIRATION RATE: 20 BRPM | HEART RATE: 78 BPM | WEIGHT: 144.81 LBS

## 2020-08-21 PROBLEM — M80.00XA OSTEOPOROSIS WITH CURRENT PATHOLOGICAL FRACTURE: Status: ACTIVE | Noted: 2020-08-21

## 2020-08-21 PROCEDURE — 97110 THERAPEUTIC EXERCISES: CPT | Mod: CQ

## 2020-08-21 PROCEDURE — 25000003 PHARM REV CODE 250: Performed by: STUDENT IN AN ORGANIZED HEALTH CARE EDUCATION/TRAINING PROGRAM

## 2020-08-21 PROCEDURE — 99231 PR SUBSEQUENT HOSPITAL CARE,LEVL I: ICD-10-PCS | Mod: ,,, | Performed by: ANESTHESIOLOGY

## 2020-08-21 PROCEDURE — 97535 SELF CARE MNGMENT TRAINING: CPT

## 2020-08-21 PROCEDURE — 25000003 PHARM REV CODE 250: Performed by: ORTHOPAEDIC SURGERY

## 2020-08-21 PROCEDURE — 99231 SBSQ HOSP IP/OBS SF/LOW 25: CPT | Mod: ,,, | Performed by: ANESTHESIOLOGY

## 2020-08-21 PROCEDURE — 97116 GAIT TRAINING THERAPY: CPT | Mod: CQ

## 2020-08-21 PROCEDURE — 97110 THERAPEUTIC EXERCISES: CPT

## 2020-08-21 PROCEDURE — 97530 THERAPEUTIC ACTIVITIES: CPT | Mod: CQ

## 2020-08-21 RX ORDER — ASPIRIN 81 MG/1
81 TABLET ORAL 2 TIMES DAILY
Qty: 60 TABLET | Refills: 0 | Status: SHIPPED | OUTPATIENT
Start: 2020-08-21 | End: 2020-12-21

## 2020-08-21 RX ORDER — METHOCARBAMOL 750 MG/1
750 TABLET, FILM COATED ORAL 3 TIMES DAILY PRN
Qty: 21 TABLET | Refills: 0 | Status: SHIPPED | OUTPATIENT
Start: 2020-08-21 | End: 2020-09-01

## 2020-08-21 RX ORDER — HYDROCODONE BITARTRATE AND ACETAMINOPHEN 10; 325 MG/1; MG/1
1 TABLET ORAL EVERY 4 HOURS PRN
Qty: 42 TABLET | Refills: 0 | Status: SHIPPED | OUTPATIENT
Start: 2020-08-21 | End: 2020-09-01

## 2020-08-21 RX ADMIN — VITAMIN D, TAB 1000IU (100/BT) 1000 UNITS: 25 TAB at 08:08

## 2020-08-21 RX ADMIN — METHOCARBAMOL 500 MG: 500 TABLET ORAL at 08:08

## 2020-08-21 RX ADMIN — CELECOXIB 100 MG: 100 CAPSULE ORAL at 08:08

## 2020-08-21 RX ADMIN — DOCUSATE SODIUM 100 MG: 100 CAPSULE, LIQUID FILLED ORAL at 08:08

## 2020-08-21 RX ADMIN — ACETAMINOPHEN 1000 MG: 500 TABLET ORAL at 02:08

## 2020-08-21 RX ADMIN — METHOCARBAMOL 500 MG: 500 TABLET ORAL at 02:08

## 2020-08-21 RX ADMIN — POLYETHYLENE GLYCOL 3350 17 G: 17 POWDER, FOR SOLUTION ORAL at 08:08

## 2020-08-21 RX ADMIN — CALCIUM 500 MG: 500 TABLET ORAL at 08:08

## 2020-08-21 RX ADMIN — ASPIRIN 81 MG CHEWABLE TABLET 81 MG: 81 TABLET CHEWABLE at 08:08

## 2020-08-21 NOTE — PROGRESS NOTES
Pt and family present, consent to converting adductor PNC to On Q.  Site CDI.  Educated regarding continued pain management, fall risk, signs of complications, continued monitoring, as well as discontinuing catheter on 8/23.  Two numbers obtained for APS to follow up.  Understanding verbalized.

## 2020-08-21 NOTE — PT/OT/SLP PROGRESS
Occupational Therapy   Treatment    Name: Ani Montoya  MRN: [id number]  Admitting Diagnosis:  Closed bicondylar fracture of right tibial plateau  3 Days Post-Op    Recommendations:     Discharge Recommendations: outpatient PT  Discharge Equipment Recommendations:  walker, rolling, bedside commode, wheelchair  Barriers to discharge:  (Decreased functional mobility and increased level of assistance)    Assessment:     Ani Montoya is a 30 y.o. female with a medical diagnosis of Closed bicondylar fracture of right tibial plateau. Performance deficits affecting function are weakness, impaired endurance, impaired self care skills, impaired functional mobilty, gait instability, impaired balance, orthopedic precautions, decreased ROM, pain. Patient tolerated treatment session and was motivated to complete tasks.    Rehab Prognosis:  Good; patient would benefit from acute skilled OT services to address these deficits and reach maximum level of function.       Plan:     Patient to be seen daily to address the above listed problems via self-care/home management, therapeutic activities, therapeutic exercises  · Plan of Care Expires: 09/18/20  · Plan of Care Reviewed with: patient    Subjective     Pain/Comfort:  · Pain Rating 1: 4/10  · Location - Side 1: Right  · Location - Orientation 1: generalized  · Location 1: leg  · Pain Addressed 1: Reposition, Distraction  · Pain Rating Post-Intervention 1: 4/10    Objective:     Communicated with: NSG prior to session.  Patient found up in chair with perineural catheter, SCD upon OT entry to room.    General Precautions: Standard, fall   Orthopedic Precautions:RLE non weight bearing   Braces:  N/A    Occupational Performance:     Functional Mobility/Transfers:  · Patient completed Sit <> Stand Transfer with supervision  with  no assistive device   · Patient completed Toilet Transfer bed<>toilet with functional ambulation technique with stand by assistance with occasional contact  guard assistance with  rolling walker and IV pole in tow    Activities of Daily Living:  · Grooming: stand by assistance standing at sink to wash/dry hands  · Toileting: stand by assistance        [facility] 6 Click ADL: 19    Treatment & Education:  Role of OT and POC  Safety  ADL retraining  Functional mobility training    Patient educated on performing green theraband exercises for shoulder abduction/adduction and bicep/tricep extension 3 x 10 to improve strength for daily tasks.    Patient educated on performing chair pushups 1 x 5 in order to improve strength in B UEs for functional transfers.     Patient left up in chair with call button in reach and all needs met. Education:      GOALS:   Multidisciplinary Problems     Occupational Therapy Goals        Problem: Occupational Therapy Goal    Goal Priority Disciplines Outcome Interventions   Occupational Therapy Goal     OT, PT/OT Ongoing, Progressing    Description: Goals to be met by: 9/19/2020     Patient will increase functional independence with ADLs by performing:    UE Dressing with Stand-by Assistance.  LE Dressing with Stand-by Assistance.  Grooming while standing at sink with Stand-by Assistance. Met  Toileting from bedside commode with Stand-by Assistance for hygiene and clothing management. Met  Toilet transfer to bedside commode with Stand-by Assistance.  Added goal: Patient will be independent with B UE exercises 3 x 10 reps.                     Time Tracking:     OT Date of Treatment: 08/21/20  OT Start Time: 1139  OT Stop Time: 1202  OT Total Time (min): 23 min    Billable Minutes:Self Care/Home Management 15  Therapeutic Exercise 8    ROSEMARIE Sotelo  8/21/2020

## 2020-08-21 NOTE — PLAN OF CARE
Pt calm and cooperative. VSS. Discharge instructions given to pt and family member at bedside. Verbalized understanding. Transport ordered.   Problem: Infection  Goal: Infection Symptom Resolution  Outcome: Ongoing, Progressing     Problem: Skin Injury Risk Increased  Goal: Skin Health and Integrity  Outcome: Ongoing, Progressing     Problem: Fall Injury Risk  Goal: Absence of Fall and Fall-Related Injury  Outcome: Met     Problem: Adult Inpatient Plan of Care  Goal: Plan of Care Review  Outcome: Met  Goal: Patient-Specific Goal (Individualization)  Outcome: Met  Goal: Absence of Hospital-Acquired Illness or Injury  Outcome: Met  Goal: Optimal Comfort and Wellbeing  Outcome: Met  Goal: Readiness for Transition of Care  Outcome: Met  Goal: Rounds/Family Conference  Outcome: Met

## 2020-08-21 NOTE — PT/OT/SLP PROGRESS
"Physical Therapy Treatment    Patient Name:  Ani Montoya   MRN:  [id number]    Recommendations:     Discharge Recommendations:  outpatient PT   Discharge Equipment Recommendations: walker, rolling, bedside commode, wheelchair   Barriers to discharge: None    Assessment:     Ani Montoya is a 30 y.o. female admitted with a medical diagnosis of Closed bicondylar fracture of right tibial plateau.  She presents with the following impairments/functional limitations:  weakness, impaired endurance, impaired self care skills, impaired functional mobilty, gait instability, impaired balance, orthopedic precautions, decreased ROM, pain.    Rehab Prognosis: Good; patient would benefit from acute skilled PT services to address these deficits and reach maximum level of function.    Recent Surgery: Procedure(s) (LRB):  ORIF, FRACTURE, TIBIA, PLATEAU (Right) 3 Days Post-Op    Plan:     During this hospitalization, patient to be seen daily to address the identified rehab impairments via gait training, therapeutic activities, therapeutic exercises and progress toward the following goals:    · Plan of Care Expires:  09/18/20    Subjective     Chief Complaint: none  Patient/Family Comments/goals: "I'm worried about getting an infection"  Pain/Comfort:  · Pain Rating 1: 4/10  · Location - Side 1: Right  · Location - Orientation 1: generalized  · Location 1: leg  · Pain Addressed 1: Reposition      Objective:     Communicated with nurse prior to session.  Patient found supine with perineural catheter, SCD upon PT entry to room.     General Precautions: Standard, fall   Orthopedic Precautions:RLE non weight bearing   Braces: N/A     Functional Mobility:  · Bed Mobility:     · Supine to Sit: independence  · Transfers:     · Sit to Stand:  independence with rolling walker  · Gait: 50 ft with RW and Good tech maintaining NWB RLE.      AM-PAC 6 CLICK MOBILITY  Turning over in bed (including adjusting bedclothes, sheets and blankets)?: " 4  Sitting down on and standing up from a chair with arms (e.g., wheelchair, bedside commode, etc.): 4  Moving from lying on back to sitting on the side of the bed?: 4  Moving to and from a bed to a chair (including a wheelchair)?: 4  Need to walk in hospital room?: 3  Climbing 3-5 steps with a railing?: 2  Basic Mobility Total Score: 21       Therapeutic Activities and Exercises:   white board updated  Reviewed all exercises and educ patient on ROM importance and gentle stretches to R gastroc/quad. instr to use boot when laying in bed to prevent R gastroc tightness.   Wheelchair mobility and managing: was able to propel in w/c 150 ft maneuvering turns with I.     Patient left up in chair with all lines intact, call button in reach and nurse notified..    GOALS:   Multidisciplinary Problems     Physical Therapy Goals        Problem: Physical Therapy Goal    Goal Priority Disciplines Outcome Goal Variances Interventions   Physical Therapy Goal     PT, PT/OT Ongoing, Progressing     Description: Goals to be met by: 2020    Patient will increase functional independence with mobility by performin. Supine to sit with Modified [place]  2. Sit to supine with Modified [place]  3. Sit to stand transfer with Modified [place]  4. Gait  x 20 feet with Modified [place] using LRAD  5. Lower extremity exercise program x15 reps, with independence                      Time Tracking:     PT Received On: 20  PT Start Time: 913     PT Stop Time: 959  PT Total Time (min): 46 min     Billable Minutes: Gait Training 15, Therapeutic Activity 16 and Therapeutic Exercise 15    Treatment Type: Treatment  PT/PTA: PTA     PTA Visit Number: Steffanie Orellana, PTA  2020

## 2020-08-21 NOTE — PLAN OF CARE
Problem: Occupational Therapy Goal  Goal: Occupational Therapy Goal  Description: Goals to be met by: 9/19/2020     Patient will increase functional independence with ADLs by performing:    UE Dressing with Stand-by Assistance.  LE Dressing with Stand-by Assistance.  Grooming while standing at sink with Stand-by Assistance. Met  Toileting from bedside commode with Stand-by Assistance for hygiene and clothing management. Met  Toilet transfer to bedside commode with Stand-by Assistance.  Added goal: Patient will be independent with B UE exercises 3 x 10 reps.    Outcome: Ongoing, Progressing   Patient's goals are appropriate. Added B UE exercise goal.   ROSEMARIE Sotelo  8/21/2020

## 2020-08-21 NOTE — PROGRESS NOTES
[facility]  Orthopedics  Progress Note    Attg Note:  Patient seen and examined.  I agree with the resident's assessment and plan.  Ergocalciferol 04146 units a week for the next 6 weeks.  Patient has appointment with endocrinology in 4 weeks.    Jordy Ortega MD      Patient Name: Ani Montoya  MRN: [id number]  Admission Date: 8/17/2020  Hospital Length of Stay: 4 days  Attending Provider: Jordy Ortega MD  Primary Care Provider: Sylvie Mabry MD  Follow-up For: Procedure(s) (LRB):  ORIF, FRACTURE, TIBIA, PLATEAU (Right)    Post-Operative Day: 3 Days Post-Op  Subjective:     Principal Problem:Closed bicondylar fracture of right tibial plateau    Principal Orthopedic Problem: s/p ORIF R tibia    Interval History: NAEON. VSS. Pain controlled. DC home today.    Review of patient's allergies indicates:  No Known Allergies    Current Facility-Administered Medications   Medication    0.9%  NaCl infusion    acetaminophen tablet 1,000 mg    aspirin chewable tablet 81 mg    calcium carbonate (OS-MICAH) tablet 500 mg    celecoxib capsule 100 mg    diphenhydrAMINE capsule 25 mg    docusate sodium capsule 100 mg    ergocalciferol capsule 50,000 Units    lidocaine (PF) 10 mg/ml (1%) injection 10 mg    melatonin tablet 6 mg    methocarbamoL tablet 500 mg    ondansetron disintegrating tablet 8 mg    oxyCODONE immediate release tablet 5 mg    polyethylene glycol packet 17 g    pregabalin capsule 50 mg    promethazine (PHENERGAN) 6.25 mg in dextrose 5 % 50 mL IVPB    ropivacaine (PF) 2 mg/ml (0.2%) infusion    ropivacaine 0.2% ON-Q C-BLOC 400 ML (SELECT A FLOW)    sodium chloride 0.9% flush 10 mL    sodium chloride 0.9% flush 10 mL    vitamin D 1000 units tablet 1,000 Units     Objective:     Vital Signs (Most Recent):  Temp: 98.5 °F (36.9 °C) (08/21/20 1146)  Pulse: 77 (08/21/20 1146)  Resp: 20 (08/21/20 1146)  BP: 111/69 (08/21/20 1146)  SpO2: 97 % (08/21/20 1146) Vital Signs  "(24h Range):  Temp:  [97.7 °F (36.5 °C)-98.8 °F (37.1 °C)] 98.5 °F (36.9 °C)  Pulse:  [65-81] 77  Resp:  [14-20] 20  SpO2:  [96 %-100 %] 97 %  BP: (103-124)/(69-87) 111/69     Weight: 65.7 kg (144 lb 13.5 oz)  Height: 4' 11" (149.9 cm)  Body mass index is 29.25 kg/m².    No intake or output data in the 24 hours ending 08/21/20 1724    Ortho/SPM Exam     Awake/alert/oriented x3, No acute distress, Afebrile, Vital signs stable  Good inspiratory effort with unlaboured breathing  Dressings c/d/i  NVI in operative limb       Significant Labs: All pertinent labs within the past 24 hours have been reviewed.    Significant Imaging: I have reviewed all pertinent imaging results/findings.    Assessment/Plan:     * Closed bicondylar fracture of right tibial plateau  Ani Montoya is a 30 y.o. female with R tibial plateau fracture s/p ORIF R tibia on 8.18.20. Discussed patients osteoporosis in detail with endocrine fellow. They do support 50,000 U vitamin D weekly and calcium 500mg BID for this pt. They would not like to see her while in-house as she will not be able to start an osteoporosis medication during this initial healing phase of the fracture. They are aware of her elevated PTH level and will order new labs for her 1 week prior to her visit in endocrine clinic on 9.22.20.      Pain control: PNC, multimodal  PT/OT: NWB RLE; gentle ROM as tolerated  DVT PPx: ASA 81 BID, SCDs at all times when not ambulating  Abx: postop Ancef/ Clinda complete  Labs: stable  Rodriguez: dced    Dispo: f/u PT recs; home today            Gerber Chowdhury MD  Orthopedics  [facility]  "

## 2020-08-21 NOTE — SUBJECTIVE & OBJECTIVE
"Principal Problem:Closed bicondylar fracture of right tibial plateau    Principal Orthopedic Problem: s/p ORIF R tibia    Interval History: NAEON. VSS. Pain controlled. DC home today.    Review of patient's allergies indicates:  No Known Allergies    Current Facility-Administered Medications   Medication    0.9%  NaCl infusion    acetaminophen tablet 1,000 mg    aspirin chewable tablet 81 mg    calcium carbonate (OS-MICAH) tablet 500 mg    celecoxib capsule 100 mg    diphenhydrAMINE capsule 25 mg    docusate sodium capsule 100 mg    ergocalciferol capsule 50,000 Units    lidocaine (PF) 10 mg/ml (1%) injection 10 mg    melatonin tablet 6 mg    methocarbamoL tablet 500 mg    ondansetron disintegrating tablet 8 mg    oxyCODONE immediate release tablet 5 mg    polyethylene glycol packet 17 g    pregabalin capsule 50 mg    promethazine (PHENERGAN) 6.25 mg in dextrose 5 % 50 mL IVPB    ropivacaine (PF) 2 mg/ml (0.2%) infusion    ropivacaine 0.2% ON-Q C-BLOC 400 ML (SELECT A FLOW)    sodium chloride 0.9% flush 10 mL    sodium chloride 0.9% flush 10 mL    vitamin D 1000 units tablet 1,000 Units     Objective:     Vital Signs (Most Recent):  Temp: 98.5 °F (36.9 °C) (08/21/20 1146)  Pulse: 77 (08/21/20 1146)  Resp: 20 (08/21/20 1146)  BP: 111/69 (08/21/20 1146)  SpO2: 97 % (08/21/20 1146) Vital Signs (24h Range):  Temp:  [97.7 °F (36.5 °C)-98.8 °F (37.1 °C)] 98.5 °F (36.9 °C)  Pulse:  [65-81] 77  Resp:  [14-20] 20  SpO2:  [96 %-100 %] 97 %  BP: (103-124)/(69-87) 111/69     Weight: 65.7 kg (144 lb 13.5 oz)  Height: 4' 11" (149.9 cm)  Body mass index is 29.25 kg/m².    No intake or output data in the 24 hours ending 08/21/20 1724    Ortho/SPM Exam     Awake/alert/oriented x3, No acute distress, Afebrile, Vital signs stable  Good inspiratory effort with unlaboured breathing  Dressings c/d/i  NVI in operative limb       Significant Labs: All pertinent labs within the past 24 hours have been " reviewed.    Significant Imaging: I have reviewed all pertinent imaging results/findings.

## 2020-08-21 NOTE — ANESTHESIA POST-OP PAIN MANAGEMENT
Acute Pain Service Progress Note    Ani Montoya is a 30 y.o., female, [id number]. PMH hypopituitarism, osteoporosis, primary amenorrhea, s/p fall on R knee presenting with tibial plateau fracture.     Surgery:  R ORIF, FRACTURE, TIBIA, PLATEAU    Post Op Day #: 3    Catheter type: perineural  R adductor canal     Infusion type: Ropivacaine 0.2%  8mL/hr basal    Problem List:    Active Hospital Problems    Diagnosis  POA    *Closed bicondylar fracture of right tibial plateau [S82.141A]  Yes      Resolved Hospital Problems   No resolved problems to display.       Subjective:     General appearance of alert, oriented. Still feels pain in back of R leg at rest, but none on anterior leg or at incision site. Complains of new onset sore throat, productive cough with chills at night. Denies HA, fever, abdominal pain, or any other complaints at this time. Required Oxy 5 at 9pm last night due to pain.    Pain with rest: 6    Numbers   Pain with movement: 7    Numbers   Side Effects    1. Pruritis No    2. Nausea No    3. Motor Blockade No    4. Sedation No, 1=awake and alert    Objective:     Catheter site clean, dry, intact     Vitals   Vitals:    08/21/20 0840   BP: 124/78   Pulse: 81   Resp: 14   Temp: 36.7 °C (98.1 °F)        Labs    Admission on 08/17/2020   Component Date Value Ref Range Status    Hemoglobin A1C 08/17/2020 5.0  4.0 - 5.6 % Final    Estimated Avg Glucose 08/17/2020 97  68 - 131 mg/dL Final    Magnesium 08/17/2020 1.8  1.6 - 2.6 mg/dL Final    Prealbumin 08/17/2020 25  20 - 43 mg/dL Final    Transferrin 08/17/2020 237  200 - 375 mg/dL Final    Group & Rh 08/17/2020 O POS   Final    Indirect Isaac 08/17/2020 NEG   Final    Specimen UA 08/18/2020 Urine, Clean Catch   Final    Color, UA 08/18/2020 Straw  Yellow, Straw, Mely Final    Appearance, UA 08/18/2020 Clear  Clear Final    pH, UA 08/18/2020 7.0  5.0 - 8.0 Final    Specific [place], UA 08/18/2020 1.030  1.005 - 1.030 Final    Protein,  UA 08/18/2020 Negative  Negative Final    Glucose, UA 08/18/2020 Negative  Negative Final    Ketones, UA 08/18/2020 Negative  Negative Final    Bilirubin (UA) 08/18/2020 Negative  Negative Final    Occult Blood UA 08/18/2020 Negative  Negative Final    Nitrite, UA 08/18/2020 Negative  Negative Final    Leukocytes, UA 08/18/2020 Negative  Negative Final    Prothrombin Time 08/17/2020 11.3  9.0 - 12.5 sec Final    INR 08/17/2020 1.0  0.8 - 1.2 Final    aPTT 08/17/2020 21.7  21.0 - 32.0 sec Final    PTH, Intact 08/18/2020 218.0* 9.0 - 77.0 pg/mL Final    Vit D, 25-Hydroxy 08/18/2020 12* 30 - 96 ng/mL Final    TSH 08/18/2020 0.785  0.400 - 4.000 uIU/mL Final    Free T4 08/18/2020 0.89  0.71 - 1.51 ng/dL Final    Phosphorus 08/18/2020 3.0  2.7 - 4.5 mg/dL Final    WBC 08/19/2020 10.95  3.90 - 12.70 K/uL Final    RBC 08/19/2020 3.86* 4.00 - 5.40 M/uL Final    Hemoglobin 08/19/2020 11.8* 12.0 - 16.0 g/dL Final    Hematocrit 08/19/2020 37.5  37.0 - 48.5 % Final    Mean Corpuscular Volume 08/19/2020 97  82 - 98 fL Final    Mean Corpuscular Hemoglobin 08/19/2020 30.6  27.0 - 31.0 pg Final    Mean Corpuscular Hemoglobin Conc 08/19/2020 31.5* 32.0 - 36.0 g/dL Final    RDW 08/19/2020 13.5  11.5 - 14.5 % Final    Platelets 08/19/2020 158  150 - 350 K/uL Final    MPV 08/19/2020 11.3  9.2 - 12.9 fL Final    Immature Granulocytes 08/19/2020 0.6* 0.0 - 0.5 % Final    Gran # (ANC) 08/19/2020 8.5* 1.8 - 7.7 K/uL Final    Immature Grans (Abs) 08/19/2020 0.07* 0.00 - 0.04 K/uL Final    Lymph # 08/19/2020 1.4  1.0 - 4.8 K/uL Final    Mono # 08/19/2020 1.0  0.3 - 1.0 K/uL Final    Eos # 08/19/2020 0.0  0.0 - 0.5 K/uL Final    Baso # 08/19/2020 0.02  0.00 - 0.20 K/uL Final    nRBC 08/19/2020 0  0 /100 WBC Final    Gran% 08/19/2020 77.2* 38.0 - 73.0 % Final    Lymph% 08/19/2020 12.8* 18.0 - 48.0 % Final    Mono% 08/19/2020 8.9  4.0 - 15.0 % Final    Eosinophil% 08/19/2020 0.3  0.0 - 8.0 % Final     Basophil% 08/19/2020 0.2  0.0 - 1.9 % Final    Differential Method 08/19/2020 Automated   Final    Sodium 08/19/2020 138  136 - 145 mmol/L Final    Potassium 08/19/2020 3.7  3.5 - 5.1 mmol/L Final    Chloride 08/19/2020 103  95 - 110 mmol/L Final    CO2 08/19/2020 26  23 - 29 mmol/L Final    Glucose 08/19/2020 93  70 - 110 mg/dL Final    BUN, Bld 08/19/2020 10  6 - 20 mg/dL Final    Creatinine 08/19/2020 0.8  0.5 - 1.4 mg/dL Final    Calcium 08/19/2020 9.2  8.7 - 10.5 mg/dL Final    Total Protein 08/19/2020 7.6  6.0 - 8.4 g/dL Final    Albumin 08/19/2020 3.6  3.5 - 5.2 g/dL Final    Total Bilirubin 08/19/2020 0.2  0.1 - 1.0 mg/dL Final    Alkaline Phosphatase 08/19/2020 90  55 - 135 U/L Final    AST 08/19/2020 21  10 - 40 U/L Final    ALT 08/19/2020 11  10 - 44 U/L Final    Anion Gap 08/19/2020 9  8 - 16 mmol/L Final    eGFR if African American 08/19/2020 >60.0  >60 mL/min/1.73 m^2 Final    eGFR if non African American 08/19/2020 >60.0  >60 mL/min/1.73 m^2 Final        Meds   Current Facility-Administered Medications   Medication Dose Route Frequency Provider Last Rate Last Dose    0.9%  NaCl infusion   Intravenous Continuous Eddie Barry MD   Stopped at 08/18/20 1158    acetaminophen tablet 1,000 mg  1,000 mg Oral Q8H aCity Carballo MD   1,000 mg at 08/20/20 2219    aspirin chewable tablet 81 mg  81 mg Oral BID Gerber Chowdhury MD   81 mg at 08/21/20 0842    calcium carbonate (OS-MICAH) tablet 500 mg  500 mg Oral BID Gerber Chowdhury MD   500 mg at 08/21/20 0843    celecoxib capsule 100 mg  100 mg Oral Daily Caity Carballo MD   100 mg at 08/21/20 0844    diphenhydrAMINE capsule 25 mg  25 mg Oral Q6H PRN Gerber Chowdhury, MD   25 mg at 08/19/20 0509    docusate sodium capsule 100 mg  100 mg Oral BID Jordy Ortega MD   100 mg at 08/21/20 0845    ergocalciferol capsule 50,000 Units  50,000 Units Oral Q7 Days Jordy Ortega MD   50,000 Units at 08/19/20 1134    lidocaine (PF) 10  mg/ml (1%) injection 10 mg  1 mL Other Once Eddie Barry MD        melatonin tablet 6 mg  6 mg Oral Nightly PRN Eddie Barry MD        methocarbamoL tablet 500 mg  500 mg Oral TID Caity Carballo MD   500 mg at 08/21/20 0844    ondansetron disintegrating tablet 8 mg  8 mg Oral Q8H PRN Eddie Barry MD   8 mg at 08/18/20 1736    oxyCODONE immediate release tablet 5 mg  5 mg Oral Q4H PRN Caity Carballo MD   5 mg at 08/20/20 2154    polyethylene glycol packet 17 g  17 g Oral BID Jordy Ortega MD   17 g at 08/21/20 0845    pregabalin capsule 50 mg  50 mg Oral QHS Caity Carballo MD   50 mg at 08/20/20 2154    promethazine (PHENERGAN) 6.25 mg in dextrose 5 % 50 mL IVPB  6.25 mg Intravenous Q6H PRN Eddie Barry  mL/hr at 08/18/20 2120 6.25 mg at 08/18/20 2120    ropivacaine (PF) 2 mg/ml (0.2%) infusion  8 mL/hr Perineural Continuous Umer Smith MD 8 mL/hr at 08/20/20 1812 8 mL/hr at 08/20/20 1812    sodium chloride 0.9% flush 10 mL  10 mL Intravenous PRN Eddie Barry MD        sodium chloride 0.9% flush 10 mL  10 mL Intravenous PRN Eddie Barry MD        vitamin D 1000 units tablet 1,000 Units  1,000 Units Oral Daily Gerber Rohit Chowdhury MD   1,000 Units at 08/21/20 0845        Anticoagulant dose ASA 81 BID.    Assessment:     Pain control adequate with current pain regimen   - Patient feels no pain on anterior R leg at rest, good response to PNC   - 6/10 pain on back of L leg controlled with multi-modals    - Patient is intentionally avoiding opiates until pain is unbearable, required 1 dose at 9pm    - Patient able to work with PT and ambulating well   - Per primary team, discharge depending on PT recs    Plan:     Patient doing well, continue present treatment.    - Continue multimodals: Tylenol, Celebrex, Robaxin, Lyrica   - Continue PRN Oxy 5 for breakthrough pain   - If discharge today, will send patient home with On-Q   - Patient coordinated people to stay with her  full time   - Will reassess pain tomorrow or follow at home with On-Q    Evaluator Caity Carballo

## 2020-08-21 NOTE — ASSESSMENT & PLAN NOTE
Ani Montoya is a 30 y.o. female with R tibial plateau fracture s/p ORIF R tibia on 8.18.20. Discussed patients osteoporosis in detail with endocrine fellow. They do support 50,000 U vitamin D weekly and calcium 500mg BID for this pt. They would not like to see her while in-house as she will not be able to start an osteoporosis medication during this initial healing phase of the fracture. They are aware of her elevated PTH level and will order new labs for her 1 week prior to her visit in endocrine clinic on 9.22.20.      Pain control: PNC, multimodal  PT/OT: NWB RLE; gentle ROM as tolerated  DVT PPx: ASA 81 BID, SCDs at all times when not ambulating  Abx: postop Ancef/ Clinda complete  Labs: stable  Rodriguez: dced    Dispo: f/u PT recs; home today

## 2020-08-21 NOTE — PLAN OF CARE
Pt remains free from falls and injury. Provided with Prn analgesic with positive results. Bed low and locked, Call light within reach.

## 2020-08-21 NOTE — PLAN OF CARE
08/21/20 0806   Post-Acute Status   Post-Acute Authorization Other;HME   HME Status Referrals Sent   Other Status No Post-Acute Service Needs

## 2020-08-22 NOTE — DISCHARGE SUMMARY
[facility]  Orthopedics  Discharge Summary      Patient Name: Ani Montoya  MRN: [id number]  Admission Date: 8/17/2020  Hospital Length of Stay: 4 days  Discharge Date and Time: 8/21/2020  7:07 PM  Attending Physician: No att. providers found   Discharging Provider: Tonia Ortega MD  Primary Care Provider: Sylvie Mabry MD    HPI:  30-year-old female with a history of hypopituitarism fell while walking her dog sustaining a right tibial plateau fracture.  She has a known history of osteoporosis.  Date of injury was 08/17/2020.  She was transferred from [place] to [facility].      Procedure(s) (LRB):  ORIF, FRACTURE, TIBIA, PLATEAU (Right)      Hospital Course: She was taken to the operating room on 08/18/2020 for ORIF of her right tibial plateau fracture.  Patient was given 39261 units of ergocalciferol for her vitamin-D of 12.  She has done well postoperatively and is ambulating well with a walker without weight-bearing on the right lower extremity with PT.  She also had a significantly elevated PTH.  She was started on daily vitamin-D and calcium by Endocrinology and will see endocrinology in clinic in the next month.  She is now ready for discharge home.  She will be nonweightbearing on the right lower extremity with range of motion as tolerated.  Will begin weight-bearing likely at 10-12 weeks postoperative.  She will be maintained on a multimodal pain regimen with the goal of getting her off of narcotic pain medication as quickly as possible.    Consults (From admission, onward)        Status Ordering Provider     Inpatient consult to Midline team  Once     Provider:  (Not yet assigned)    Completed TONIA ORTEGA     Inpatient consult to Orthopedic Surgery  Once     Provider:  (Not yet assigned)    Completed LANNY INIGUEZ              Pending Diagnostic Studies:     None        Final Active Diagnoses:    Diagnosis Date Noted POA    PRINCIPAL PROBLEM:  Closed bicondylar  "fracture of right tibial plateau [S82.141A] 08/17/2020 Yes    Osteoporosis with current pathological fracture [M80.00XA] 08/21/2020 Yes      Problems Resolved During this Admission:      Discharged Condition: good    Disposition: Home or Self Care    Follow Up:    Patient Instructions:      WALKER FOR HOME USE     Order Specific Question Answer Comments   Type of Walker: Adult (5'4"-6'6")    With wheels? Yes    Height: 4' 11" (1.499 m)    Weight: 65.7 kg (144 lb 13.5 oz)    Length of need (1-99 months): 999    Does patient have medical equipment at home? none    Please check all that apply: Patient is unable to safely ambulate without equipment.    Vendor: [facility]    Expected Date of Delivery: 8/20/2020      COMMODE FOR HOME USE   Order Comments: Post surgery, will need to be delivered to home address     Order Specific Question Answer Comments   Type: Standard    Height: 4' 11" (1.499 m)    Weight: 65.7 kg (144 lb 13.5 oz)    Does patient have medical equipment at home? none    Length of need (1-99 months): 99    Vendor: [facility]    Expected Date of Delivery: 8/20/2020      WHEELCHAIR FOR HOME USE   Order Comments: Please deliver to room.     Order Specific Question Answer Comments   Hours in W/C per day: 24    Type of Wheelchair: Standard    Size(Width): 18"(STD adult)    Leg Support: Elevating leg rests    Lap Belt: Velcro    Cushion: Basic    Height: 4' 11" (1.499 m)    Weight: 65.7 kg (144 lb 13.5 oz)    Does patient have medical equipment at home? none    Length of need (1-99 months): 99    Please check all that apply: Caregiver is capable and willing to operate wheelchair safely.    Vendor: Other (use comments)    Expected Date of Delivery: 8/20/2020      Medications:  Reconciled Home Medications:      Medication List      START taking these medications    aspirin 81 MG EC tablet  Commonly known as: ECOTRIN  Take 1 tablet (81 mg total) by mouth 2 (two) times daily.     calcium-vitamin D3 500 " mg(1,250mg) -200 unit per tablet  Commonly known as: OS-MICAH 500 + D3  Take 1 tablet by mouth 2 (two) times daily with meals.     ergocalciferol 50,000 unit Cap  Commonly known as: ERGOCALCIFEROL  Take 1 capsule (50,000 Units total) by mouth every 7 days.     HYDROcodone-acetaminophen  mg per tablet  Commonly known as: NORCO  Take 1 tablet by mouth every 4 (four) hours as needed for Pain.     methocarbamoL 750 MG Tab  Commonly known as: ROBAXIN  Take 1 tablet (750 mg total) by mouth 3 (three) times daily as needed.        CONTINUE taking these medications    * estradioL 0.5 MG tablet  Commonly known as: ESTRACE  Take 1 tablet (0.5 mg total) by mouth once daily.     * estradioL 0.01 % (0.1 mg/gram) vaginal cream  Commonly known as: ESTRACE  Place pea-sized amount vaginally with finger every night for 3 weeks, then 2-3 times per week.     FAMVIR ORAL  Take 500 mg by mouth.     ibuprofen 800 MG tablet  Commonly known as: ADVIL,MOTRIN  Take 1 tablet (800 mg total) by mouth every 6 (six) hours as needed for Pain.     medroxyPROGESTERone 10 MG tablet  Commonly known as: PROVERA  Take 0.5 tablets (5 mg total) by mouth once daily.         * This list has 2 medication(s) that are the same as other medications prescribed for you. Read the directions carefully, and ask your doctor or other care provider to review them with you.                Jordy Ortega MD  Orthopedics  [facility]

## 2020-08-22 NOTE — ANESTHESIA POST-OP PAIN MANAGEMENT
Called patient at [phone]. She reports pain manageable at this time.  PNC infusing without issues. Reiterated plan for PNC removal tomorrow. Patient stated understanding. Will follow up.

## 2020-08-23 NOTE — ANESTHESIA POST-OP PAIN MANAGEMENT
Called patient at [phone]. PNC removed with blue tip intact. Encouraged patient to call with any questions/concerns.

## 2020-08-23 NOTE — ADDENDUM NOTE
Addendum  created 08/23/20 0111 by Tad Smith MD    Clinical Note Signed, Intraprocedure Event edited

## 2020-08-26 ENCOUNTER — PATIENT MESSAGE (OUTPATIENT)
Dept: ORTHOPEDICS | Facility: CLINIC | Age: 30
End: 2020-08-26

## 2020-08-26 ENCOUNTER — TELEPHONE (OUTPATIENT)
Dept: ORTHOPEDICS | Facility: CLINIC | Age: 30
End: 2020-08-26

## 2020-08-26 ENCOUNTER — HOSPITAL ENCOUNTER (OUTPATIENT)
Dept: RADIOLOGY | Facility: HOSPITAL | Age: 30
Discharge: HOME | End: 2020-08-26
Attending: NURSE PRACTITIONER
Payer: COMMERCIAL

## 2020-08-26 DIAGNOSIS — S82.141A CLOSED BICONDYLAR FRACTURE OF RIGHT TIBIAL PLATEAU: ICD-10-CM

## 2020-08-26 DIAGNOSIS — M79.661 RIGHT CALF PAIN: Primary | ICD-10-CM

## 2020-08-26 DIAGNOSIS — M79.661 RIGHT CALF PAIN: ICD-10-CM

## 2020-08-26 DIAGNOSIS — S82.141A CLOSED BICONDYLAR FRACTURE OF RIGHT TIBIAL PLATEAU: Primary | ICD-10-CM

## 2020-08-26 DIAGNOSIS — Z98.890 POST-OPERATIVE STATE: ICD-10-CM

## 2020-08-26 PROCEDURE — 93971 EXTREMITY STUDY: CPT | Mod: TC,RT

## 2020-08-26 PROCEDURE — 93971 US LOWER EXTREMITY VEINS RIGHT: ICD-10-PCS | Mod: 26,RT,, | Performed by: RADIOLOGY

## 2020-08-26 PROCEDURE — 93971 EXTREMITY STUDY: CPT | Mod: 26,RT,, | Performed by: RADIOLOGY

## 2020-08-26 RX ORDER — IBUPROFEN 600 MG/1
600 TABLET ORAL EVERY 6 HOURS PRN
Qty: 30 TABLET | Refills: 0 | Status: SHIPPED | OUTPATIENT
Start: 2020-08-26 | End: 2020-09-01 | Stop reason: SDUPTHER

## 2020-08-26 RX ORDER — IBUPROFEN 600 MG/1
600 TABLET ORAL EVERY 6 HOURS PRN
Qty: 30 TABLET | Refills: 0 | Status: SHIPPED | OUTPATIENT
Start: 2020-08-26 | End: 2020-08-26

## 2020-08-26 NOTE — PROGRESS NOTES
Patient called office with c/c severe right calf pain since her surgery.  She is currently on estrace and Provera daily.  Taking ASA 81 mg bid.  Will order ultrasound of right leg to evaluate for DVT.

## 2020-08-26 NOTE — TELEPHONE ENCOUNTER
Patient called with results of right leg ultrasound.  No DVT.  Will send in prescription for Motrin 600 mg q6h PRN.  She will continue her other prescribed medications.  States pain to calf noted when she moves leg from elevated to dependent position.  She endorses she has applied ice and used Motrin 200 mg PRN with little benefit.  Denies fever or injuries.      Advised her where to send disability paperwork and I will see her on 9/1/2020 as schedule.  She will call if pain or swelling worsens.

## 2020-08-27 NOTE — ADDENDUM NOTE
Addendum  created 08/27/20 1039 by Abhishek Don MD    Attestation recorded in Intraprocedure, Intraprocedure Attestations filed

## 2020-08-31 ENCOUNTER — DOCUMENTATION ONLY (OUTPATIENT)
Dept: ORTHOPEDICS | Facility: CLINIC | Age: 30
End: 2020-08-31

## 2020-08-31 NOTE — PROGRESS NOTES
Forwarded [facility] paperwork to the disability department for processing.

## 2020-09-01 ENCOUNTER — OFFICE VISIT (OUTPATIENT)
Dept: ORTHOPEDICS | Facility: CLINIC | Age: 30
End: 2020-09-01
Payer: COMMERCIAL

## 2020-09-01 VITALS
BODY MASS INDEX: 29.19 KG/M2 | HEIGHT: 59 IN | WEIGHT: 144.81 LBS | HEART RATE: 69 BPM | SYSTOLIC BLOOD PRESSURE: 115 MMHG | DIASTOLIC BLOOD PRESSURE: 81 MMHG

## 2020-09-01 DIAGNOSIS — S82.141A CLOSED BICONDYLAR FRACTURE OF RIGHT TIBIAL PLATEAU: Primary | ICD-10-CM

## 2020-09-01 DIAGNOSIS — Z98.890 POST-OPERATIVE STATE: ICD-10-CM

## 2020-09-01 PROCEDURE — 99999 PR PBB SHADOW E&M-EST. PATIENT-LVL III: ICD-10-PCS | Mod: PBBFAC,,, | Performed by: NURSE PRACTITIONER

## 2020-09-01 PROCEDURE — 99999 PR PBB SHADOW E&M-EST. PATIENT-LVL III: CPT | Mod: PBBFAC,,, | Performed by: NURSE PRACTITIONER

## 2020-09-01 PROCEDURE — 99024 POSTOP FOLLOW-UP VISIT: CPT | Mod: S$GLB,,, | Performed by: NURSE PRACTITIONER

## 2020-09-01 PROCEDURE — 99024 PR POST-OP FOLLOW-UP VISIT: ICD-10-PCS | Mod: S$GLB,,, | Performed by: NURSE PRACTITIONER

## 2020-09-01 RX ORDER — IBUPROFEN 600 MG/1
600 TABLET ORAL EVERY 6 HOURS PRN
Qty: 30 TABLET | Refills: 0 | Status: SHIPPED | OUTPATIENT
Start: 2020-09-01 | End: 2020-09-25 | Stop reason: SDUPTHER

## 2020-09-01 RX ORDER — HYDROCODONE BITARTRATE AND ACETAMINOPHEN 10; 325 MG/1; MG/1
1 TABLET ORAL EVERY 6 HOURS PRN
Qty: 28 TABLET | Refills: 0 | Status: SHIPPED | OUTPATIENT
Start: 2020-09-01 | End: 2020-09-22 | Stop reason: SDUPTHER

## 2020-09-01 RX ORDER — GABAPENTIN 100 MG/1
100 CAPSULE ORAL NIGHTLY
Qty: 30 CAPSULE | Refills: 0 | Status: SHIPPED | OUTPATIENT
Start: 2020-09-01 | End: 2020-09-22 | Stop reason: ALTCHOICE

## 2020-09-01 NOTE — PROGRESS NOTES
Ms. Montoya is here today for a post-operative visit after undergoing an ORIF for her right bicondylar tibial plateau fracture by Dr. Ortega on 8/18/2020.    Interval History:  She reports that she is doing ok.  Pain is tolerable with prescribed medication.  She is taking pain medication.  She has stopped the Robaxin and tries to limit use of Norco to q6h.  She would like a refill today.  She is also reporting intermittent swelling but occurs when her leg dangles.  Swelling improves when she elevates it.  She is reporting numbness just below her knee joint to mid lower leg and intermittent shooting pain to her ankle.  She denies fever, chills, and sweats since the time of the surgery.     Physical exam:  Dressing taken down.  Incision is clean, dry and intact.  Incision is secured with dermabond, there is no drainage or redness.  She has tactile stimulation.  PPP x 2.  Incision cleaned with betadine today and left open to air.      RADS: none done today    Assessment:  Post-op visit (2 weeks)    Plan:    ICD-10-CM ICD-9-CM   1. Closed bicondylar fracture of right tibial plateau  S82.141A 823.00   2. Post-operative state  Z98.890 V45.89     Current care, treatment plan, precautions, activity level/ modifications, limitations, rehabilitation exercises and proposed future treatment were discussed with the patient. We discussed the need to monitor for changes in symptoms and condition and report them to the physician.  Discussed importance of compliance with all appointments and follow up examinations.       - Pain medication: refill was needed, will prescribe neurontin 100 mg qhs and refill Norco 10/325 mg PRN but change frequency to q6h PRN and refill Motrin 600 mg tid PRN.  - Pain medication refill policy provided to patient for review, yes   - PTH is elevated and Vitamin D is low.  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  She has an appointment with Endocrine on 9/11/2020.  She is using Vitamin  D 50,000 units weekly and taking calcium supplement bid.  - Patient is to return to clinic in 4 weeks  - At time x-ray of her right tib/fib is needed  - Will refer to Endocrine for assistance of hypopituitarism and osteoporosis.  - Per post-op note, it is recommended NWB to RLE for 10-12 weeks pending fracture healing.      -Ani was advised to keep the incision clean and dry for the next 24 hours after which she may wash the area with antibacterial soap in the shower.   -She not submerge until the incision is completely healed  -Patient was advised to monitor wound closely and multiple times daily for any problems. Call clinic immediately or report to ED for immediate medical attention for any complications including reopening of wound, drainage, purulence, redness, streaking, odor, pain out of proportion, fever, chills, etc.        If there are any questions prior to scheduled follow up, the patient was instructed to contact the office

## 2020-09-22 ENCOUNTER — OFFICE VISIT (OUTPATIENT)
Dept: ENDOCRINOLOGY | Facility: CLINIC | Age: 30
End: 2020-09-22
Payer: COMMERCIAL

## 2020-09-22 ENCOUNTER — PATIENT MESSAGE (OUTPATIENT)
Dept: ORTHOPEDICS | Facility: CLINIC | Age: 30
End: 2020-09-22

## 2020-09-22 ENCOUNTER — OFFICE VISIT (OUTPATIENT)
Dept: ORTHOPEDICS | Facility: CLINIC | Age: 30
End: 2020-09-22
Payer: COMMERCIAL

## 2020-09-22 ENCOUNTER — HOSPITAL ENCOUNTER (OUTPATIENT)
Dept: RADIOLOGY | Facility: HOSPITAL | Age: 30
Discharge: HOME | End: 2020-09-22
Attending: NURSE PRACTITIONER
Payer: COMMERCIAL

## 2020-09-22 VITALS
BODY MASS INDEX: 22.18 KG/M2 | HEART RATE: 52 BPM | DIASTOLIC BLOOD PRESSURE: 82 MMHG | SYSTOLIC BLOOD PRESSURE: 122 MMHG | TEMPERATURE: 99 F | HEIGHT: 59 IN | RESPIRATION RATE: 18 BRPM | WEIGHT: 110 LBS

## 2020-09-22 DIAGNOSIS — M80.00XA OSTEOPOROSIS WITH CURRENT PATHOLOGICAL FRACTURE, UNSPECIFIED OSTEOPOROSIS TYPE, INITIAL ENCOUNTER: ICD-10-CM

## 2020-09-22 DIAGNOSIS — S82.141A CLOSED BICONDYLAR FRACTURE OF RIGHT TIBIAL PLATEAU: Primary | ICD-10-CM

## 2020-09-22 DIAGNOSIS — S82.141A CLOSED BICONDYLAR FRACTURE OF RIGHT TIBIAL PLATEAU: ICD-10-CM

## 2020-09-22 DIAGNOSIS — N91.0 PRIMARY AMENORRHEA: ICD-10-CM

## 2020-09-22 DIAGNOSIS — E55.9 VITAMIN D DEFICIENCY: ICD-10-CM

## 2020-09-22 DIAGNOSIS — M81.0 OSTEOPOROSIS, UNSPECIFIED OSTEOPOROSIS TYPE, UNSPECIFIED PATHOLOGICAL FRACTURE PRESENCE: Primary | ICD-10-CM

## 2020-09-22 PROCEDURE — 99999 PR PBB SHADOW E&M-EST. PATIENT-LVL III: ICD-10-PCS | Mod: PBBFAC,,, | Performed by: NURSE PRACTITIONER

## 2020-09-22 PROCEDURE — 99999 PR PBB SHADOW E&M-EST. PATIENT-LVL V: ICD-10-PCS | Mod: PBBFAC,,, | Performed by: GENERAL ACUTE CARE HOSPITAL

## 2020-09-22 PROCEDURE — 99999 PR PBB SHADOW E&M-EST. PATIENT-LVL III: CPT | Mod: PBBFAC,,, | Performed by: NURSE PRACTITIONER

## 2020-09-22 PROCEDURE — 99024 POSTOP FOLLOW-UP VISIT: CPT | Mod: S$GLB,,, | Performed by: NURSE PRACTITIONER

## 2020-09-22 PROCEDURE — 3008F PR BODY MASS INDEX (BMI) DOCUMENTED: ICD-10-PCS | Mod: CPTII,S$GLB,, | Performed by: GENERAL ACUTE CARE HOSPITAL

## 2020-09-22 PROCEDURE — 99024 PR POST-OP FOLLOW-UP VISIT: ICD-10-PCS | Mod: S$GLB,,, | Performed by: NURSE PRACTITIONER

## 2020-09-22 PROCEDURE — 73590 X-RAY EXAM OF LOWER LEG: CPT | Mod: TC,RT

## 2020-09-22 PROCEDURE — 99999 PR PBB SHADOW E&M-EST. PATIENT-LVL V: CPT | Mod: PBBFAC,,, | Performed by: GENERAL ACUTE CARE HOSPITAL

## 2020-09-22 PROCEDURE — 73590 XR TIBIA FIBULA 2 VIEW RIGHT: ICD-10-PCS | Mod: 26,RT,, | Performed by: RADIOLOGY

## 2020-09-22 PROCEDURE — 99204 OFFICE O/P NEW MOD 45 MIN: CPT | Mod: S$GLB,,, | Performed by: GENERAL ACUTE CARE HOSPITAL

## 2020-09-22 PROCEDURE — 99204 PR OFFICE/OUTPT VISIT, NEW, LEVL IV, 45-59 MIN: ICD-10-PCS | Mod: S$GLB,,, | Performed by: GENERAL ACUTE CARE HOSPITAL

## 2020-09-22 PROCEDURE — 3008F BODY MASS INDEX DOCD: CPT | Mod: CPTII,S$GLB,, | Performed by: GENERAL ACUTE CARE HOSPITAL

## 2020-09-22 PROCEDURE — 73590 X-RAY EXAM OF LOWER LEG: CPT | Mod: 26,RT,, | Performed by: RADIOLOGY

## 2020-09-22 RX ORDER — ERGOCALCIFEROL 1.25 MG/1
50000 CAPSULE ORAL
Qty: 12 CAPSULE | Refills: 1 | Status: SHIPPED | OUTPATIENT
Start: 2020-09-22

## 2020-09-22 RX ORDER — AMITRIPTYLINE HYDROCHLORIDE 25 MG/1
25 TABLET, FILM COATED ORAL NIGHTLY PRN
Qty: 30 TABLET | Refills: 0 | Status: SHIPPED | OUTPATIENT
Start: 2020-09-22 | End: 2021-09-22

## 2020-09-22 RX ORDER — FERROUS SULFATE, DRIED 160(50) MG
1 TABLET, EXTENDED RELEASE ORAL 2 TIMES DAILY WITH MEALS
Qty: 60 TABLET | Refills: 11 | Status: SHIPPED | OUTPATIENT
Start: 2020-09-22 | End: 2020-11-06 | Stop reason: SDUPTHER

## 2020-09-22 RX ORDER — HYDROCODONE BITARTRATE AND ACETAMINOPHEN 10; 325 MG/1; MG/1
1 TABLET ORAL EVERY 6 HOURS PRN
Qty: 28 TABLET | Refills: 0 | Status: SHIPPED | OUTPATIENT
Start: 2020-09-22 | End: 2020-10-20 | Stop reason: ALTCHOICE

## 2020-09-22 RX ORDER — VIT C/E/ZN/COPPR/LUTEIN/ZEAXAN 250MG-90MG
1000 CAPSULE ORAL DAILY
Qty: 90 CAPSULE | Refills: 3 | Status: SHIPPED | OUTPATIENT
Start: 2020-09-22

## 2020-09-22 NOTE — ASSESSMENT & PLAN NOTE
-Diagnosed August 2020 s/p a fragility fracture   -Fractures: Tibial August 2020 s/p surgery   -Last vitamin D level: 12 on 8/18/2020  -Medications: Estradiol 0.5mg PO daily (started 5 to 6 years ago with some improvement of her bone density)  -Vitamin D and Calcium supplements: Taking calcium 1000mg daily and 50,000 vitamin D weekly  -PHPT: 218 August 2020  -Steroids: None  -ETOH: Socially  -Smoking: No   -Hx of primary amenorrhea    BMD from 2014 showed osteoporosis to lumbar spine, total hip and femoral neck   NM bone age scan in 2014, showed bone age of a 14 year old       Dexa December 2019  -L1 to L4 T score of  -2.1. There has been a 4.7% bone mineral density gain compared to the prior exam.  -The left femoral neck bone T score of  -1.5.  Prior T-score was -1.7.  -The right femoral neck bone T score of -1.6.  Prior T-score was -1.8.  -The neck mean  bone  T score of -1.5.  Prior T-score was -1.7.  Impression: Osteopenia    Plan  Recommended to keep taking vitamin D 50,000 units weekly plus calcium oscal twice a day  In addition recommended to take extra Vitamin D3 1000 units PO daily  Continue Estradiol 0.5mg PO daily  Once vitamin D normalized will discuss treatment options  Repeat vitamin D level and PTH in 6 weeks  RTC in 6 weeks with labs prior

## 2020-09-22 NOTE — PROGRESS NOTES
"Subjective:      Patient ID: Ani Montoya is a 30 y.o. female.    Chief Complaint:  Osteoporosis      History of Present Illness    30 year old female who was seen by  in the past for primary amenorrhea and short stature    This is the patient's first visit with me for management of osteoporosis, was walking dog and tripped and used her right foot to brake the fall and her tibia broke (patient never fell). Underwent surgery August 18, 2020 (an ORIF of her right bicondylar tibial plateau fracture by Jordan Bradley). Since the surgery in a lot of pain, has been on pain (her left ankle), was told to not put to much pressure on her leg). Per patient will start therapy until November 17, 2020.     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   Patient used to be in GH therapy in the past and was held due to patient complaining of HA. Plan was to re-start her GH in 2014 but did not resumed because of insurance coverage    She is 4'11" , all of her family members are above 5'10".   She is sexually active with one partner. She has been to ER once in 2011 with vaginal laceration and required operative management.   Never had breast development. Had breast augmentation surgery in 2014       Regarding osteoporosis  -Diagnosed August 2020 s/p a fragility fracture (was walking dog and tripped and used her right foot to brake the fall and her tibia broke (patient never fell)  -Fractures: Tibial August 2020 s/p surgery an ORIF of her right bicondylar tibial plateau fracture  -Falls: None recently  -Last vitamin D level: 12 on 8/18/2020  -Medications: Estradiol 0.5mg PO daily (started 5 to 6 years ago with some improvement of her bone density)  -Vitamin D and Calcium supplements: Taking calcium 1000mg daily and 50,000 vitamin D weekly  -Decrease in height: No decrease in height  -PHPT: 218 August 2020  -Steroids: None  -ETOH: " "Socially  -Smoking: No   -Hx of primary amenorrhea    BMD from 2014 showed osteoporosis to lumbar spine, total hip and femoral neck   NM bone age scan in 2014, showed bone age of a 14 year old      Dexa December 2019  The L1 to L4 vertebral bone mineral density is equal to 0.934 g/cm squared with a T score of  -2.1.  There has been a 4.7% bone mineral density gain compared to the prior exam.  The left femoral neck bone mineral density is equal to 0.831 g/cm squared with a T score of  -1.5.  Prior T-score was -1.7.  The right femoral neck bone mineral density is equal to 0.814 g/cm squared with a T score of -1.6.  Prior T-score was -1.8.  The neck mean  bone mineral density is equal to 0.823 g/cm squared with a T score of -1.5.  Prior T-score was -1.7.  Impression: Osteopenia    Review of Systems   Constitutional: Negative for activity change and unexpected weight change.   HENT: Negative for sore throat and voice change.    Eyes: Negative for visual disturbance.   Respiratory: Negative for shortness of breath.    Cardiovascular: Negative for chest pain.   Gastrointestinal: Negative for abdominal pain, constipation, diarrhea, nausea and vomiting.   Genitourinary: Negative for urgency.   Musculoskeletal: Negative for arthralgias.   Skin: Negative for wound.   Neurological: Negative for headaches.   Psychiatric/Behavioral: Negative for confusion and sleep disturbance.       Social and family history reviewed  Current medications and allergies reviewed    Objective:   /82 (BP Location: Right arm, Patient Position: Sitting, BP Method: Small (Manual))   Pulse (!) 52   Temp 98.8 °F (37.1 °C) (Oral)   Resp 18   Ht 4' 11" (1.499 m)   Wt 49.9 kg (110 lb)   BMI 22.22 kg/m²   Physical Exam  Vitals signs and nursing note reviewed.   Constitutional:       General: She is not in acute distress.     Appearance: She is well-developed.      Comments: Patient in wheelchair to prevent placing weight on her injured leg "   HENT:      Head: Normocephalic and atraumatic.      Right Ear: External ear normal.      Left Ear: External ear normal.      Nose: Nose normal.   Eyes:      General: No scleral icterus.     Conjunctiva/sclera: Conjunctivae normal.   Neck:      Musculoskeletal: Normal range of motion and neck supple. No neck rigidity.      Thyroid: No thyromegaly.      Trachea: No tracheal deviation.      Comments: No thyroid enlargement noted on exam  Cardiovascular:      Rate and Rhythm: Normal rate.      Heart sounds: Normal heart sounds. No murmur.   Pulmonary:      Effort: Pulmonary effort is normal.      Breath sounds: Normal breath sounds.   Abdominal:      Palpations: Abdomen is soft. There is no mass.      Tenderness: There is no abdominal tenderness.   Musculoskeletal: Normal range of motion.      Comments: In wheelchair, told not to put weight in her right leg until November 2020   Lymphadenopathy:      Cervical: No cervical adenopathy.   Skin:     General: Skin is warm.      Findings: No rash.   Neurological:      Mental Status: She is alert and oriented to person, place, and time.      Sensory: No sensory deficit.      Coordination: Coordination normal.   Psychiatric:         Judgment: Judgment normal.       BP Readings from Last 1 Encounters:   09/22/20 122/82      Wt Readings from Last 1 Encounters:   09/22/20 1015 49.9 kg (110 lb)     Body mass index is 22.22 kg/m².    Lab Review:   Lab Results   Component Value Date    HGBA1C 5.0 08/17/2020     Lab Results   Component Value Date    CHOL 250 (H) 12/13/2019    HDL 71 12/13/2019    LDLCALC 158.8 12/13/2019    TRIG 101 12/13/2019    CHOLHDL 28.4 12/13/2019     Lab Results   Component Value Date     08/19/2020    K 3.7 08/19/2020     08/19/2020    CO2 26 08/19/2020    GLU 93 08/19/2020    BUN 10 08/19/2020    CREATININE 0.8 08/19/2020    CALCIUM 9.2 08/19/2020    PROT 7.6 08/19/2020    ALBUMIN 3.6 08/19/2020    BILITOT 0.2 08/19/2020    ALKPHOS 90 08/19/2020     AST 21 08/19/2020    ALT 11 08/19/2020    ANIONGAP 9 08/19/2020    ESTGFRAFRICA >60.0 08/19/2020    EGFRNONAA >60.0 08/19/2020    TSH 0.785 08/18/2020       All pertinent labs reviewed    Assessment and Plan     Osteoporosis with current pathological fracture  -Diagnosed August 2020 s/p a fragility fracture   -Fractures: Tibial August 2020 s/p surgery   -Last vitamin D level: 12 on 8/18/2020  -Medications: Estradiol 0.5mg PO daily (started 5 to 6 years ago with some improvement of her bone density)  -Vitamin D and Calcium supplements: Taking calcium 1000mg daily and 50,000 vitamin D weekly  -PHPT: 218 August 2020  -Steroids: None  -ETOH: Socially  -Smoking: No   -Hx of primary amenorrhea    BMD from 2014 showed osteoporosis to lumbar spine, total hip and femoral neck   NM bone age scan in 2014, showed bone age of a 15 year old       Dexa December 2019  -L1 to L4 T score of  -2.1. There has been a 4.7% bone mineral density gain compared to the prior exam.  -The left femoral neck bone T score of  -1.5.  Prior T-score was -1.7.  -The right femoral neck bone T score of -1.6.  Prior T-score was -1.8.  -The neck mean  bone  T score of -1.5.  Prior T-score was -1.7.  Impression: Osteopenia    Plan  Recommended to keep taking vitamin D 50,000 units weekly plus calcium oscal twice a day  In addition recommended to take extra Vitamin D3 1000 units PO daily  Continue Estradiol 0.5mg PO daily  Once vitamin D normalized will discuss treatment options  Repeat vitamin D level and PTH in 6 weeks  RTC in 6 weeks with labs prior          Primary amenorrhea  Continue Estradiol 0.5mg PO daily

## 2020-09-22 NOTE — PATIENT INSTRUCTIONS
RTC in 6 week with labs prior    Recommended to keep taking vitamin D 50,000 units weekly plus calcium oscal twice a day    In addition recommended to take extra Vitamin D3 1000 units PO daily    Will discuss in next visit options for treatment

## 2020-09-22 NOTE — PROGRESS NOTES
"Ms. Montoya is here today for a post-operative visit after undergoing an ORIF for her right bicondylar tibial plateau fracture by Dr. Ortega on 8/18/2020.    Interval History:  Patient comes in today with c/c right knee pain and dorsum of foot tenderness.  She reports her left ankle gave way the other day and accidentally put about "30%" weight down onto her right foot.  She was concerned she may have done something to her knee and therefore came in today.  Her main concern is the numbness and pain to her lower leg and foot.  She has been using 200 mg of Neurontin but finds it is not very helpful with the hypersensitivity of her lower leg/foot.  She is still using Norco PRN.  She reports her sensitivity follows a vein from dorsum of foot up to mid-lower leg.  She reports intermittent swelling to the lower leg but improves when she elevates it.  She denies fever, chills, and sweats since the time of the surgery.     Physical exam:  Incision to medial knee is open to air and healing well.  There is no drainage or redness.  She has tactile stimulation.  PPP x 2.  She can flex and extend knee from 0-90 degrees.  ROM of ankle is unremarkable.      RADS: right tib/fib x-ray obtained and personally reviewed by me.  Hardware to right proximal tibial plateau fracture is in good position without evidence of hardware failure.  No new fractures seen.  Visualization of ankle joint reveals no fractures there as well.    Assessment:  Post-op visit (5 weeks)    Plan:    ICD-10-CM ICD-9-CM   1. Closed bicondylar fracture of right tibial plateau  S82.141A 823.00     Current care, treatment plan, precautions, activity level/ modifications, limitations, rehabilitation exercises and proposed future treatment were discussed with the patient. We discussed the need to monitor for changes in symptoms and condition and report them to the physician.  Discussed importance of compliance with all appointments and follow up examinations. "       - Pain medication: refill was needed, will prescribe refill Norco 10/325 mg PRN and change Neurontin to Elavil 25 mg qhs.  She will continue Motrin as previously prescribed.  - Pain medication refill policy provided to patient for review, yes   - PTH is elevated and Vitamin D is low.  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  She has an appointment with Endocrine on 9/11/2020.  She is using Vitamin D 50,000 units weekly and taking calcium supplement bid.  - Patient is to return to clinic in 4 weeks  - At time x-ray of her right tib/fib and 2 view of right knee is needed  - Seen by Endocrine earlier today, note still pending.   - Per post-op note, it is recommended NWB to RLE for 10-12 weeks pending fracture healing.           If there are any questions prior to scheduled follow up, the patient was instructed to contact the office

## 2020-09-25 ENCOUNTER — PATIENT MESSAGE (OUTPATIENT)
Dept: ADMINISTRATIVE | Facility: OTHER | Age: 30
End: 2020-09-25

## 2020-09-25 DIAGNOSIS — S82.141A CLOSED BICONDYLAR FRACTURE OF RIGHT TIBIAL PLATEAU: ICD-10-CM

## 2020-09-25 DIAGNOSIS — Z98.890 POST-OPERATIVE STATE: ICD-10-CM

## 2020-09-25 RX ORDER — IBUPROFEN 600 MG/1
600 TABLET ORAL EVERY 6 HOURS PRN
Qty: 30 TABLET | Refills: 0 | Status: SHIPPED | OUTPATIENT
Start: 2020-09-25 | End: 2020-12-21 | Stop reason: SDUPTHER

## 2020-10-02 ENCOUNTER — PATIENT MESSAGE (OUTPATIENT)
Dept: ENDOCRINOLOGY | Facility: CLINIC | Age: 30
End: 2020-10-02

## 2020-10-02 ENCOUNTER — TELEPHONE (OUTPATIENT)
Dept: ENDOCRINOLOGY | Facility: HOSPITAL | Age: 30
End: 2020-10-02

## 2020-10-02 DIAGNOSIS — N91.0 PRIMARY AMENORRHEA: ICD-10-CM

## 2020-10-02 DIAGNOSIS — M81.0 OSTEOPOROSIS, UNSPECIFIED OSTEOPOROSIS TYPE, UNSPECIFIED PATHOLOGICAL FRACTURE PRESENCE: Primary | ICD-10-CM

## 2020-10-20 ENCOUNTER — HOSPITAL ENCOUNTER (OUTPATIENT)
Dept: RADIOLOGY | Facility: HOSPITAL | Age: 30
Discharge: HOME | End: 2020-10-20
Attending: NURSE PRACTITIONER
Payer: COMMERCIAL

## 2020-10-20 ENCOUNTER — OFFICE VISIT (OUTPATIENT)
Dept: ORTHOPEDICS | Facility: CLINIC | Age: 30
End: 2020-10-20
Payer: COMMERCIAL

## 2020-10-20 ENCOUNTER — TELEPHONE (OUTPATIENT)
Dept: ORTHOPEDICS | Facility: CLINIC | Age: 30
End: 2020-10-20

## 2020-10-20 VITALS
BODY MASS INDEX: 22.18 KG/M2 | HEART RATE: 94 BPM | HEIGHT: 59 IN | SYSTOLIC BLOOD PRESSURE: 106 MMHG | WEIGHT: 110 LBS | DIASTOLIC BLOOD PRESSURE: 75 MMHG

## 2020-10-20 DIAGNOSIS — M25.561 RIGHT KNEE PAIN, UNSPECIFIED CHRONICITY: ICD-10-CM

## 2020-10-20 DIAGNOSIS — M25.561 RIGHT KNEE PAIN, UNSPECIFIED CHRONICITY: Primary | ICD-10-CM

## 2020-10-20 DIAGNOSIS — M79.604 PAIN OF RIGHT LOWER EXTREMITY: Primary | ICD-10-CM

## 2020-10-20 DIAGNOSIS — M79.604 PAIN OF RIGHT LOWER EXTREMITY: ICD-10-CM

## 2020-10-20 PROCEDURE — 99024 PR POST-OP FOLLOW-UP VISIT: ICD-10-PCS | Mod: S$GLB,,, | Performed by: NURSE PRACTITIONER

## 2020-10-20 PROCEDURE — 73590 X-RAY EXAM OF LOWER LEG: CPT | Mod: TC,RT

## 2020-10-20 PROCEDURE — 99999 PR PBB SHADOW E&M-EST. PATIENT-LVL IV: CPT | Mod: PBBFAC,,, | Performed by: NURSE PRACTITIONER

## 2020-10-20 PROCEDURE — 73560 XR KNEE 1 OR 2 VIEW RIGHT: ICD-10-PCS | Mod: 26,RT,, | Performed by: RADIOLOGY

## 2020-10-20 PROCEDURE — 73590 XR TIBIA FIBULA 2 VIEW RIGHT: ICD-10-PCS | Mod: 26,RT,, | Performed by: RADIOLOGY

## 2020-10-20 PROCEDURE — 73560 X-RAY EXAM OF KNEE 1 OR 2: CPT | Mod: 26,RT,, | Performed by: RADIOLOGY

## 2020-10-20 PROCEDURE — 99999 PR PBB SHADOW E&M-EST. PATIENT-LVL IV: ICD-10-PCS | Mod: PBBFAC,,, | Performed by: NURSE PRACTITIONER

## 2020-10-20 PROCEDURE — 99024 POSTOP FOLLOW-UP VISIT: CPT | Mod: S$GLB,,, | Performed by: NURSE PRACTITIONER

## 2020-10-20 PROCEDURE — 73560 X-RAY EXAM OF KNEE 1 OR 2: CPT | Mod: TC,RT

## 2020-10-20 PROCEDURE — 73590 X-RAY EXAM OF LOWER LEG: CPT | Mod: 26,RT,, | Performed by: RADIOLOGY

## 2020-10-20 NOTE — TELEPHONE ENCOUNTER
----- Message from Helder Charlton sent at 10/20/2020  8:37 AM CDT -----  Contact: Patient  The pt called and would like to know why her appt  for today needs to be rescheduled    The pt can be reached at [phone]

## 2020-10-20 NOTE — PROGRESS NOTES
Ms. Montoya is here today for a post-operative visit after undergoing an ORIF for her right bicondylar tibial plateau fracture by Dr. Ortega on 8/18/2020.    Interval History:  Patient comes in today for follow up for her right proximal tibial plateau fracture.  She reports her foot pain improved since starting Elavil.  She currently has minimal pain.  She is eager to start walking.  She has kept her weight off of her right leg as instructed.  She also wants to know when it would be ok to return to work.  She denies fever, chills, numbness or tingling sensation to her lower leg.  She is no longer taking Norco.       Physical exam:  Incision to medial knee is open to air and healing well.  There is no drainage or redness.  She has tactile stimulation.  PPP x 2.  She can flex and extend knee from 0-90 degrees.  ROM of ankle is unremarkable.      RADS: right tib/fib x-ray obtained and personally reviewed by me.  Hardware to right proximal tibial plateau fracture is in good position without evidence of hardware failure.  No new fractures seen.  Visualization of ankle joint reveals no fractures there as well.    Assessment:  Post-op visit (9 weeks)    Plan:    ICD-10-CM ICD-9-CM   1. Pain of right lower extremity  M79.604 729.5     Current care, treatment plan, precautions, activity level/ modifications, limitations, rehabilitation exercises and proposed future treatment were discussed with the patient. We discussed the need to monitor for changes in symptoms and condition and report them to the physician.  Discussed importance of compliance with all appointments and follow up examinations.       - Pain medication: refill was not needed continue Elavil 25 mg qhs.  She will continue Motrin as previously prescribed.  - Pain medication refill policy provided to patient for review, yes   - PTH is elevated and Vitamin D is low.  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  She has an appointment with  Endocrine on 11/6/2020.  She is using Vitamin D 50,000 units weekly and taking calcium supplement bid.  - Patient is to return to clinic in 3 weeks  - At time x-ray of her right tib/fib and 2 view of right knee is needed  - If fracture is stable, will refer to therapy to begin weight bear as tolerated.  - Advise will consider releasing back to work on light duty at next follow up.  - No driving, will reconsider at later date.  - Per post-op note, it is recommended NWB to RLE for 10-12 weeks pending fracture healing.         If there are any questions prior to scheduled follow up, the patient was instructed to contact the office

## 2020-10-30 ENCOUNTER — LAB VISIT (OUTPATIENT)
Dept: LAB | Facility: HOSPITAL | Age: 30
End: 2020-10-30
Attending: GENERAL ACUTE CARE HOSPITAL
Payer: COMMERCIAL

## 2020-10-30 DIAGNOSIS — M81.0 OSTEOPOROSIS, UNSPECIFIED OSTEOPOROSIS TYPE, UNSPECIFIED PATHOLOGICAL FRACTURE PRESENCE: ICD-10-CM

## 2020-10-30 LAB
25(OH)D3+25(OH)D2 SERPL-MCNC: 41 NG/ML (ref 30–96)
PTH-INTACT SERPL-MCNC: 40.3 PG/ML (ref 9–77)

## 2020-10-30 PROCEDURE — 82306 VITAMIN D 25 HYDROXY: CPT

## 2020-10-30 PROCEDURE — 36415 COLL VENOUS BLD VENIPUNCTURE: CPT

## 2020-10-30 PROCEDURE — 83970 ASSAY OF PARATHORMONE: CPT

## 2020-11-05 ENCOUNTER — TELEPHONE (OUTPATIENT)
Dept: ENDOCRINOLOGY | Facility: CLINIC | Age: 30
End: 2020-11-05

## 2020-11-05 NOTE — TELEPHONE ENCOUNTER
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     Follows with us for management of her osteoporosis    Noted to have secondary hyperparathyroidism due to her vitamin D deficiency    Her vitamin D was repleted and PTH normalized    She is to follow up with OBGYN for re-starting estrogen to help strenghten her bone density    Has a follow up tomorrow with me

## 2020-11-06 ENCOUNTER — OFFICE VISIT (OUTPATIENT)
Dept: ENDOCRINOLOGY | Facility: CLINIC | Age: 30
End: 2020-11-06
Payer: COMMERCIAL

## 2020-11-06 ENCOUNTER — TELEPHONE (OUTPATIENT)
Dept: OBSTETRICS AND GYNECOLOGY | Facility: CLINIC | Age: 30
End: 2020-11-06

## 2020-11-06 VITALS
HEIGHT: 59 IN | DIASTOLIC BLOOD PRESSURE: 84 MMHG | WEIGHT: 110 LBS | BODY MASS INDEX: 22.18 KG/M2 | SYSTOLIC BLOOD PRESSURE: 122 MMHG | RESPIRATION RATE: 18 BRPM | HEART RATE: 64 BPM

## 2020-11-06 DIAGNOSIS — N91.0 PRIMARY AMENORRHEA: ICD-10-CM

## 2020-11-06 DIAGNOSIS — E23.0 GROWTH HORMONE DEFICIENCY: ICD-10-CM

## 2020-11-06 DIAGNOSIS — M81.0 OSTEOPOROSIS, UNSPECIFIED OSTEOPOROSIS TYPE, UNSPECIFIED PATHOLOGICAL FRACTURE PRESENCE: ICD-10-CM

## 2020-11-06 DIAGNOSIS — M80.00XA OSTEOPOROSIS WITH CURRENT PATHOLOGICAL FRACTURE, UNSPECIFIED OSTEOPOROSIS TYPE, INITIAL ENCOUNTER: Primary | ICD-10-CM

## 2020-11-06 PROCEDURE — 3008F PR BODY MASS INDEX (BMI) DOCUMENTED: ICD-10-PCS | Mod: CPTII,S$GLB,, | Performed by: GENERAL ACUTE CARE HOSPITAL

## 2020-11-06 PROCEDURE — 99999 PR PBB SHADOW E&M-EST. PATIENT-LVL IV: ICD-10-PCS | Mod: PBBFAC,,, | Performed by: GENERAL ACUTE CARE HOSPITAL

## 2020-11-06 PROCEDURE — 99214 OFFICE O/P EST MOD 30 MIN: CPT | Mod: S$GLB,,, | Performed by: GENERAL ACUTE CARE HOSPITAL

## 2020-11-06 PROCEDURE — 99999 PR PBB SHADOW E&M-EST. PATIENT-LVL IV: CPT | Mod: PBBFAC,,, | Performed by: GENERAL ACUTE CARE HOSPITAL

## 2020-11-06 PROCEDURE — 99214 PR OFFICE/OUTPT VISIT, EST, LEVL IV, 30-39 MIN: ICD-10-PCS | Mod: S$GLB,,, | Performed by: GENERAL ACUTE CARE HOSPITAL

## 2020-11-06 PROCEDURE — 3008F BODY MASS INDEX DOCD: CPT | Mod: CPTII,S$GLB,, | Performed by: GENERAL ACUTE CARE HOSPITAL

## 2020-11-06 RX ORDER — VIT C/E/ZN/COPPR/LUTEIN/ZEAXAN 250MG-90MG
1000 CAPSULE ORAL DAILY
Qty: 30 CAPSULE | Refills: 11 | Status: SHIPPED | OUTPATIENT
Start: 2020-11-06

## 2020-11-06 RX ORDER — FERROUS SULFATE, DRIED 160(50) MG
1 TABLET, EXTENDED RELEASE ORAL 2 TIMES DAILY WITH MEALS
Qty: 60 TABLET | Refills: 11 | Status: SHIPPED | OUTPATIENT
Start: 2020-11-06 | End: 2021-11-06

## 2020-11-06 NOTE — ASSESSMENT & PLAN NOTE
-Diagnosed August 2020 s/p a fragility fracture   -Fractures: Tibial August 2020 s/p surgery   -Last vitamin D level: 12 on 8/18/2020  -Medications: Estradiol 0.5mg PO daily (started 5 to 6 years ago with some improvement of her bone density)  -Vitamin D and Calcium supplements: Taking calcium 1000mg daily and 50,000 vitamin D weekly  -PHPT: 218 August 2020  -Steroids: None  -ETOH: Socially  -Smoking: No   -Hx of primary amenorrhea     BMD from 2014 showed osteoporosis to lumbar spine, total hip and femoral neck   NM bone age scan in 2014, showed bone age of a 14 year old       Dexa December 2019  -L1 to L4 T score of  -2.1. There has been a 4.7% bone mineral density gain compared to the prior exam.  -The left femoral neck bone T score of  -1.5.  Prior T-score was -1.7.  -The right femoral neck bone T score of -1.6.  Prior T-score was -1.8.  -The neck mean  bone  T score of -1.5.  Prior T-score was -1.7.  Impression: Osteopenia     Plan  Recommended to stop vitamin D 50,000  Recommended to take calcium with vitamin D BID plus vitamin D 1000 units PO daily  Continue Estradiol 0.5mg PO daily  Will repeat IGF-I level and re-start GH therapy  Will refer to OBGYN for estrogen optimization for her osteoporosis and repeat DEXA in 1 year and will determine at that point if needs a bone forming agent

## 2020-11-06 NOTE — TELEPHONE ENCOUNTER
----- Message from Jenise Pham MD sent at 11/6/2020 11:38 AM CST -----  Hi, I placed a referral for this patient to follow with Dr. Alatorre    To help optimize her estrogen levels thanks

## 2020-11-06 NOTE — PROGRESS NOTES
"Subjective:      Patient ID: Ani Montoya is a 30 y.o. female.    Chief Complaint:  Osteoporosis    History of Present Illness    30 year old female who was seen by  in the past for primary amenorrhea and short stature    This is a follow up for management of osteoporosis, was walking dog and tripped and used her right foot to brake the fall and her tibia broke (patient never fell). Underwent surgery August 18, 2020 (an ORIF of her right bicondylar tibial plateau fracture by Jordan Bradley). Has not placed any pressure to her leg since surgery and has been using wheelchair to get around.    Per patient will start therapy November 17, 2020.     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   Patient used to be in GH therapy in the past and was held due to patient complaining of HA. Plan was to re-start her GH in 2014 but did not resumed because of insurance coverage    She is 4'11" , all of her family members are above 5'10".   She is sexually active with one partner. She has been to ER once in 2011 with vaginal laceration and required operative management.   Never had breast development. Had breast augmentation surgery in 2014     Regarding osteoporosis  -Diagnosed August 2020 s/p a fragility fracture (was walking dog and tripped and used her right foot to brake the fall and her tibia broke (patient never fell)  -Fractures: Tibial August 2020 s/p surgery an ORIF of her right bicondylar tibial plateau fracture  -Falls: None recently  -Last vitamin D level 41, previously was 12 on 8/18/2020  -Medications: Estradiol 0.5mg PO daily (started 5 to 6 years ago with some improvement of her bone density)  -Vitamin D and Calcium supplements: Taking calcium 1000mg daily and 50,000 vitamin D weekly  -Decrease in height: No decrease in height  -PTH level 40.3 was 218 August 2020  -Estradiol level 32  -IGF-I: in progress  -Steroids: " "None  -ETOH: Socially  -Smoking: No   -Hx of primary amenorrhea    BMD from 2014 showed osteoporosis to lumbar spine, total hip and femoral neck   NM bone age scan in 2014, showed bone age of a 14 year old      Dexa December 2019  The L1 to L4 vertebral bone mineral density is equal to 0.934 g/cm squared with a T score of  -2.1.  There has been a 4.7% bone mineral density gain compared to the prior exam.  The left femoral neck bone mineral density is equal to 0.831 g/cm squared with a T score of  -1.5.  Prior T-score was -1.7.  The right femoral neck bone mineral density is equal to 0.814 g/cm squared with a T score of -1.6.  Prior T-score was -1.8.  The neck mean  bone mineral density is equal to 0.823 g/cm squared with a T score of -1.5.  Prior T-score was -1.7.  Impression: Osteopenia    Review of Systems   Constitutional: Negative for activity change and unexpected weight change.   HENT: Negative for sore throat and voice change.    Eyes: Negative for visual disturbance.   Respiratory: Negative for shortness of breath.    Cardiovascular: Negative for chest pain.   Gastrointestinal: Negative for abdominal pain, constipation, diarrhea, nausea and vomiting.   Genitourinary: Negative for urgency.   Musculoskeletal: Negative for arthralgias.   Skin: Negative for wound.   Neurological: Negative for headaches.   Psychiatric/Behavioral: Negative for confusion and sleep disturbance.       Social and family history reviewed  Current medications and allergies reviewed    Objective:   /84   Pulse 64   Resp 18   Ht 4' 11" (1.499 m)   Wt 49.9 kg (110 lb)   BMI 22.22 kg/m²   Physical Exam  Vitals signs and nursing note reviewed.   Constitutional:       General: She is not in acute distress.     Appearance: She is well-developed.      Comments: Patient in wheelchair to prevent placing weight on her injured leg   HENT:      Head: Normocephalic and atraumatic.      Right Ear: External ear normal.      Left Ear: " External ear normal.      Nose: Nose normal.   Eyes:      General: No scleral icterus.     Conjunctiva/sclera: Conjunctivae normal.   Neck:      Musculoskeletal: Normal range of motion and neck supple. No neck rigidity.      Thyroid: No thyromegaly.      Trachea: No tracheal deviation.      Comments: No thyroid enlargement noted on exam  Cardiovascular:      Rate and Rhythm: Normal rate.      Heart sounds: Normal heart sounds. No murmur.   Pulmonary:      Effort: Pulmonary effort is normal.      Breath sounds: Normal breath sounds.   Abdominal:      Palpations: Abdomen is soft. There is no mass.      Tenderness: There is no abdominal tenderness.   Musculoskeletal: Normal range of motion.      Comments: In wheelchair, told not to put weight in her right leg until November 2020   Lymphadenopathy:      Cervical: No cervical adenopathy.   Skin:     General: Skin is warm.      Findings: No rash.   Neurological:      Mental Status: She is alert and oriented to person, place, and time.      Sensory: No sensory deficit.      Coordination: Coordination normal.   Psychiatric:         Judgment: Judgment normal.       BP Readings from Last 1 Encounters:   11/06/20 122/84      Wt Readings from Last 1 Encounters:   11/06/20 1025 49.9 kg (110 lb)     Body mass index is 22.22 kg/m².    Lab Review:   Lab Results   Component Value Date    HGBA1C 5.0 08/17/2020     Lab Results   Component Value Date    CHOL 250 (H) 12/13/2019    HDL 71 12/13/2019    LDLCALC 158.8 12/13/2019    TRIG 101 12/13/2019    CHOLHDL 28.4 12/13/2019     Lab Results   Component Value Date     08/19/2020    K 3.7 08/19/2020     08/19/2020    CO2 26 08/19/2020    GLU 93 08/19/2020    BUN 10 08/19/2020    CREATININE 0.8 08/19/2020    CALCIUM 9.2 08/19/2020    PROT 7.6 08/19/2020    ALBUMIN 3.6 08/19/2020    BILITOT 0.2 08/19/2020    ALKPHOS 90 08/19/2020    AST 21 08/19/2020    ALT 11 08/19/2020    ANIONGAP 9 08/19/2020    ESTGFRAFRICA >60.0 08/19/2020     EGFRNONAA >60.0 08/19/2020    TSH 0.785 08/18/2020       All pertinent labs reviewed    Assessment and Plan     Osteoporosis with current pathological fracture  -Diagnosed August 2020 s/p a fragility fracture   -Fractures: Tibial August 2020 s/p surgery   -Last vitamin D level: 12 on 8/18/2020  -Medications: Estradiol 0.5mg PO daily (started 5 to 6 years ago with some improvement of her bone density)  -Vitamin D and Calcium supplements: Taking calcium 1000mg daily and 50,000 vitamin D weekly  -PHPT: 218 August 2020  -Steroids: None  -ETOH: Socially  -Smoking: No   -Hx of primary amenorrhea     BMD from 2014 showed osteoporosis to lumbar spine, total hip and femoral neck   NM bone age scan in 2014, showed bone age of a 14 year old       Dexa December 2019  -L1 to L4 T score of  -2.1. There has been a 4.7% bone mineral density gain compared to the prior exam.  -The left femoral neck bone T score of  -1.5.  Prior T-score was -1.7.  -The right femoral neck bone T score of -1.6.  Prior T-score was -1.8.  -The neck mean  bone  T score of -1.5.  Prior T-score was -1.7.  Impression: Osteopenia     Plan  Recommended to stop vitamin D 50,000  Recommended to take calcium with vitamin D BID plus vitamin D 1000 units PO daily  Continue Estradiol 0.5mg PO daily  Will repeat IGF-I level and re-start GH therapy  Will refer to OBGYN for estrogen optimization for her osteoporosis and repeat DEXA in 1 year and will determine at that point if needs a bone forming agent      Primary amenorrhea  Continue Estradiol 0.5mg PO daily     Growth hormone deficiency  Will repeat levels and will re-start therapy

## 2020-11-09 ENCOUNTER — OFFICE VISIT (OUTPATIENT)
Dept: ORTHOPEDICS | Facility: CLINIC | Age: 30
End: 2020-11-09
Payer: COMMERCIAL

## 2020-11-09 ENCOUNTER — HOSPITAL ENCOUNTER (OUTPATIENT)
Dept: RADIOLOGY | Facility: HOSPITAL | Age: 30
Discharge: HOME | End: 2020-11-09
Attending: NURSE PRACTITIONER
Payer: COMMERCIAL

## 2020-11-09 VITALS
BODY MASS INDEX: 22.18 KG/M2 | SYSTOLIC BLOOD PRESSURE: 113 MMHG | HEART RATE: 65 BPM | DIASTOLIC BLOOD PRESSURE: 80 MMHG | HEIGHT: 59 IN | WEIGHT: 110 LBS

## 2020-11-09 DIAGNOSIS — S82.141A CLOSED BICONDYLAR FRACTURE OF RIGHT TIBIAL PLATEAU: Primary | ICD-10-CM

## 2020-11-09 DIAGNOSIS — Z98.890 POST-OPERATIVE STATE: ICD-10-CM

## 2020-11-09 DIAGNOSIS — M79.604 PAIN OF RIGHT LOWER EXTREMITY: Primary | ICD-10-CM

## 2020-11-09 DIAGNOSIS — M25.561 RIGHT KNEE PAIN, UNSPECIFIED CHRONICITY: Primary | ICD-10-CM

## 2020-11-09 DIAGNOSIS — M79.604 PAIN OF RIGHT LOWER EXTREMITY: ICD-10-CM

## 2020-11-09 DIAGNOSIS — M25.561 RIGHT KNEE PAIN, UNSPECIFIED CHRONICITY: ICD-10-CM

## 2020-11-09 PROCEDURE — 73590 X-RAY EXAM OF LOWER LEG: CPT | Mod: 26,RT,, | Performed by: RADIOLOGY

## 2020-11-09 PROCEDURE — 73590 XR TIBIA FIBULA 2 VIEW RIGHT: ICD-10-PCS | Mod: 26,RT,, | Performed by: RADIOLOGY

## 2020-11-09 PROCEDURE — 73560 XR KNEE 1 OR 2 VIEW RIGHT: ICD-10-PCS | Mod: 26,RT,, | Performed by: RADIOLOGY

## 2020-11-09 PROCEDURE — 99024 PR POST-OP FOLLOW-UP VISIT: ICD-10-PCS | Mod: S$GLB,,, | Performed by: NURSE PRACTITIONER

## 2020-11-09 PROCEDURE — 73560 X-RAY EXAM OF KNEE 1 OR 2: CPT | Mod: 26,RT,, | Performed by: RADIOLOGY

## 2020-11-09 PROCEDURE — 73590 X-RAY EXAM OF LOWER LEG: CPT | Mod: TC,RT

## 2020-11-09 PROCEDURE — 73560 X-RAY EXAM OF KNEE 1 OR 2: CPT | Mod: TC,RT

## 2020-11-09 PROCEDURE — 99999 PR PBB SHADOW E&M-EST. PATIENT-LVL IV: ICD-10-PCS | Mod: PBBFAC,,, | Performed by: NURSE PRACTITIONER

## 2020-11-09 PROCEDURE — 99999 PR PBB SHADOW E&M-EST. PATIENT-LVL IV: CPT | Mod: PBBFAC,,, | Performed by: NURSE PRACTITIONER

## 2020-11-09 PROCEDURE — 99024 POSTOP FOLLOW-UP VISIT: CPT | Mod: S$GLB,,, | Performed by: NURSE PRACTITIONER

## 2020-11-09 NOTE — PROGRESS NOTES
Ms. Montoya is here today for a post-operative visit after undergoing an ORIF for her right bicondylar tibial plateau fracture by Dr. Ortega on 8/18/2020.    Interval History:  Patient comes in today for follow up for her right proximal tibial plateau fracture.  She reports no pain currently.  She is eager to start walking and wants to know when she can return to work.  She has kept her weight off of her right leg as instructed.  She denies fever, chills, numbness or tingling sensation to her lower leg.         Physical exam:  Incision to medial knee is open to air and healed.  There is no drainage or redness.  She has tactile stimulation.  PPP x 2.  She can flex and extend knee from 0-110 degrees.  ROM of ankle is unremarkable.      RADS: right tib/fib and right knee x-ray obtained and personally reviewed by me.  Hardware to right proximal tibial plateau fracture is in good position without evidence of hardware failure.  No new fractures seen.      Assessment:  Post-op visit (12 weeks)    Plan:    ICD-10-CM ICD-9-CM   1. Closed bicondylar fracture of right tibial plateau  S82.141A 823.00   2. Post-operative state  Z98.890 V45.89     Current care, treatment plan, precautions, activity level/ modifications, limitations, rehabilitation exercises and proposed future treatment were discussed with the patient. We discussed the need to monitor for changes in symptoms and condition and report them to the physician.  Discussed importance of compliance with all appointments and follow up examinations.       - Pain medication: refill was not needed.  - Pain medication refill policy provided to patient for review, yes   - PTH is elevated and Vitamin D is low.  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  She is following up with Endocrine per their note, stop Vitamin D 50,000 and continue Calcium with Vitamin D bid puls vitamin D 1000 IU daily.  She will continue her Estradiol 0.5 mg dialy  - Patient is to  return to clinic in 6 weeks  - At time x-ray of her right tib/fib and 2 view of right knee is needed  - Will refer to therapy to begin weight bear as tolerated.  - Ok to return to work.  May need 2 more weeks of light duty until therapy can get her more functional.  - Therapy to evaluate reaction time, when ok can resume driving.           If there are any questions prior to scheduled follow up, the patient was instructed to contact the office

## 2020-11-10 NOTE — PROGRESS NOTES
I have reviewed and concur with Dr. Pham's history, physical, assessment, and plan.  I have personally interviewed and examined the patient.    On low dose estrogen with estrogen levels in lower end of normal range. Will set-up visit with gyn to optimize these levels. Pending improvement in BMD, can consider need for additional bone-specific medication and would chose either PTH analog or bisphosphonate  Will also repeat IGF1 and plan to resume GH replacement pending results. Potential side effects and benefits reviewed with patient who agrees with this plan    Shanon Roth MD

## 2020-11-11 ENCOUNTER — CLINICAL SUPPORT (OUTPATIENT)
Dept: REHABILITATION | Facility: HOSPITAL | Age: 30
End: 2020-11-11
Payer: COMMERCIAL

## 2020-11-11 DIAGNOSIS — S82.141A CLOSED BICONDYLAR FRACTURE OF RIGHT TIBIAL PLATEAU: ICD-10-CM

## 2020-11-11 DIAGNOSIS — Z74.09 DECREASED MOBILITY AND ENDURANCE: ICD-10-CM

## 2020-11-11 DIAGNOSIS — M25.661 DECREASED RANGE OF MOTION (ROM) OF RIGHT KNEE: ICD-10-CM

## 2020-11-11 DIAGNOSIS — R53.1 DECREASED STRENGTH: ICD-10-CM

## 2020-11-11 PROCEDURE — 97116 GAIT TRAINING THERAPY: CPT | Mod: PN

## 2020-11-11 PROCEDURE — 97110 THERAPEUTIC EXERCISES: CPT | Mod: PN

## 2020-11-11 PROCEDURE — 97161 PT EVAL LOW COMPLEX 20 MIN: CPT | Mod: PN

## 2020-11-11 NOTE — PLAN OF CARE
[facility]  Physical Therapy Initial Evaluation    Name: Ani Montoya  Clinic Number: [id number]    Therapy Diagnosis:   Encounter Diagnoses   Name Primary?    Closed bicondylar fracture of right tibial plateau     Decreased strength     Decreased range of motion (ROM) of right knee     Decreased mobility and endurance      Physician: Theodore Hannah NP    Physician Orders: PT Eval and Treat:    Weight bear as tolerated        Medical Diagnosis from Referral: S82.141A (ICD-10-CM) - Closed bicondylar fracture of right tibial plateau  Evaluation Date: 11/11/2020  Authorization Period Expiration: 12/31/2020  Plan of Care Expiration: 1/8/2021  Visit # / Visits authorized: 1/60  FOTO: 1/5  PTA Visit: 0/6    Time In: 7:10 AM  Time Out: 7:45 AM  Total Billable Time: 35 minutes (1 TE + 1GT+ 1 LCE)    Precautions: Standard and Hypopituitarism, osteoporosis, WBAT on RLE    Subjective   Date of onset: 8/16/2020  History of current condition - Ani reports: on August 16th she was walking her dog where she tripped and then caught herself with her right leg causing a R tibial plateau fracture. She ended up having an ORIF on August 17th, stayed in hospital for about a week, and was non-weight beairng for 12 weeks. She does not complain  Much R knee pain, most of her pain is at the bottom of her right foot, and nerve like pain at the top of her right foot. She tried to put some weight on it the other day causing some increased R ankle pain, and is currently using a RW to take weight off her RLE. Denies any numbness or tingling, and denies any other conditions of pains than stated above.     Medical History:   Past Medical History:   Diagnosis Date    Hypopituitarism     Osteoporosis     Primary amenorrhea        Surgical History:   Ani Montoya  has a past surgical history that includes breast aug (Bilateral, 2012) and Open reduction and internal fixation (ORIF) of fracture of tibial  plateau (Right, 8/18/2020).    Medications:   Ani has a current medication list which includes the following prescription(s): amitriptyline, aspirin, calcium-vitamin d3, cholecalciferol (vitamin d3), cholecalciferol (vitamin d3), ergocalciferol, estradiol, estradiol, famciclovir, ibuprofen, and medroxyprogesterone.    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 Would like to get clear to do light duty as soon as possible  Prior Level of Function: no issues before injury  Current Level of Function: trouble placing weight on RLE  Pts goals: to walk and resume my normal ADLs    Pain:  Current 0/10, worst 10/10, best 0/10   Location: bottom of mid and forefoot that wrapped up to top of right foot  Description: Aching, Dull, Burning and Sharp  Aggravating Factors: see current functional limitations  Easing Factors: takes 600 mg ibuprofen, getting off of foot    Objective     Gait: NWB on RLE, step to gait pattern with RW  Posture: increased forward lean  Sensation: light touch intact  Palpation: no TTP, mild R ankle edema  Joint mobility: decreased R TCJ mobility    * = knee pain with testing  NT = Not tested  Hip  Right   Left  Pain/Dysfunction with Movement    AROM PROM MMT AROM PROM MMT    Flexion (L2,120 deg) WFL WFL 4/5 WFL WFL 5/5    Extension (20 deg) WFL WFL 4/5 WFL WFL 5/5    Abduction (L5, 45 deg) WFL WFL 4/5 WFL WFL 5/5    Adduction (30 deg) WFL WFL 4/5 WFL WFL 5/5    IR (30 deg) WFL WFL 4/5 WFL WFL 5/5    ER (45 deg) WFL WFL 4/5 WFL WFL 5/5       Knee  Right   Left  Pain/Dysfunction with Movement    AROM PROM MMT AROM PROM MMT    Flexion (S2, 140 deg) 131 135 4/5 141 142 5/5    Extension (L3, 0-5 deg) 0 +8 4/5 0 +7 5/5      Ankle  Right   Left  Pain/Dysfunction with Movement    AROM PROM MMT AROM PROM MMT     Dorsiflexion (L4) WFL WFL 4/5 WFL WFL 5/5    Plantarflexion (S1) WFL WFL 4/5 WFL WFL 5/5    Inversion WFL WFL 4/5 WFL WFL 5/5    Eversion WFL WFL 4/5 WFL WFL 5/5    Great toe extension (L5) WFL WFL NT WFL WFL NT      Princess's Test = not tested  Barak's Compression Test [(+) B for pain increasing with knee extension] = not tested  Patellar Grind Test = WNL B  Knee Varus Stress Test = increased laxity B, valgum deformity present B  Knee Valgus Stress Test = WNL B  Anterior Drawer Test = increased laxity B, equal  Posterior Drawer Test = WNL B  PCL sag sign = not tested  Pivot Shift test = not tested    CMS Impairment/Limitation/Restriction for FOTO KNEE Survey    Therapist reviewed FOTO scores for Ani Montoya on 11/11/2020.   FOTO documents entered into [employer] - see Media section.    Limitation Score: 57%  Category: Mobility  Predicted: 30%     TREATMENT   Treatment Time In: 7:29 AM  Treatment Time Out: 7:45 AM  Total Treatment time separate from Evaluation: 16 minutes    Ani received therapeutic exercises to develop strength, endurance, ROM, flexibility and posture for 8 minutes including:  Ankle circles: 10x R  Bridges: 10x DL, cues for equal WB B and R hip lift  SLR: 1x10'' R  Heel slides: 1x10'' R  Hip abd: 10x R    Ani received 8 minutes of gait training consisting of:  Standing anterior/posterior weight shift: 10x5'' R with RW  80' with RW at SPV, cues for equal step length and hands on RW for safety    Home Exercises and Patient Education Provided  Education provided: Yes  - correct body mechanics for knee health  - course of therapy, prognosis    Written Home Exercises Provided: yes.  Exercises were reviewed and Ani was able to demonstrate them prior to the end of the session.  Ani demonstrated good  understanding of the education provided.     See EMR under Media for exercises provided 11/11/2020.    Assessment   Ani is a 30 y.o. female referred to outpatient Physical Therapy with a medical  diagnosis of Closed bicondylar fracture of right tibial plateau at [facility] location. Pt currently presents with R ankle pain, decreased R knee ROM, decreased RLE strength, impaired posture, impaired balance and gait, R ankle edema, and functional deficits with prolonged walking and standing activities. Pt would benefit from skilled PT consisting of gait training, muscular skeletal stretching/strengthening, manual therapy, neuro muscular re-education, and modalities prn to address limitations and increase functional mobility.    Pt prognosis is Excellent.   Pt will benefit from skilled outpatient Physical Therapy to address the deficits stated above and in the chart below, provide pt/family education, and to maximize pt's level of independence.     Plan of care discussed with patient: Yes  Pt's spiritual, cultural and educational needs considered and patient is agreeable to the plan of care and goals as stated below:     Anticipated Barriers for therapy: None    Medical Necessity is demonstrated by the following  History  Co-morbidities and personal factors that may impact the plan of care Co-morbidities:   Hypopituitarism, osteoporosis    Personal Factors:   no deficits     moderate   Examination  Body Structures and Functions, activity limitations and participation restrictions that may impact the plan of care Body Regions:   lower extremities  trunk    Body Systems:    gross symmetry  ROM  strength  gross coordinated movement  balance  gait  transfers  transitions  motor control  edema    Participation Restrictions:   Recreational activites    Activity limitations:   Learning and applying knowledge  no deficits    General Tasks and Commands  no deficits    Communication  no deficits    Mobility  lifting and carrying objects  walking  driving (bike, car, motorcycle)    Self care  no deficits    Domestic Life  shopping  cooking  doing house work (cleaning house, washing dishes,  laundry)  assisting others    Interactions/Relationships  no deficits    Life Areas  no deficits    Community and Social Life  no deficits         high   Clinical Presentation stable and uncomplicated low   Decision Making/ Complexity Score: low     GOALS: Short Term Goals:  4 weeks  1. Report decreased R knee and ankle pain </=2/10 in standing to increase tolerance for ADLs and increased QoL.  2. Increase R knee AROM to 0-145 degrees in order to be able to perform ADLs without difficulty.  3. Increase strength by 1/3 MMT grade in BLE  to increase tolerance for ADL and work activities.  4. Pt to tolerate HEP to improve ROM and independence with ADL's.    Long Term Goals: 8 weeks  1. Report decreased R ankle/knee pain </= 1-2/10 in walking and stair negotiation to increase tolerance for ADLs and increased QoL.  2. Patient goal: to walk and resume my normal ADLs  3. Increase strength to >/= 5/5 in BLE to increase tolerance for ADL and work activities.  4. Pt will report at </= 30% impaired on KNEE FOTO to demonstrate increase in LE function with every day tasks.   5. Pt will negotiate 8 steps and ambulate community distances at >/= Mod I for increased functional mobility.  6. Pt will be able to squat, jog, and perform all work related activities for return to daily and work duties.    Plan   Plan of care Certification: 11/11/2020 to 1/8/2021.    Outpatient Physical Therapy 2 times weekly for 8 weeks to include the following interventions: Aquatic Therapy, Debridement (nonselective), Debridement (selective), Electrical Stimulation IFC/Russian, Gait Training, Manual Therapy, Moist Heat/ Ice, Neuromuscular Re-ed, Orthotic Management and Training, Patient Education, Self Care, Therapeutic Activites and Therapeutic Exercise.     Anthony Stein, PT

## 2020-11-13 ENCOUNTER — CLINICAL SUPPORT (OUTPATIENT)
Dept: REHABILITATION | Facility: HOSPITAL | Age: 30
End: 2020-11-13
Payer: COMMERCIAL

## 2020-11-13 DIAGNOSIS — M25.661 DECREASED RANGE OF MOTION (ROM) OF RIGHT KNEE: ICD-10-CM

## 2020-11-13 DIAGNOSIS — R53.1 DECREASED STRENGTH: ICD-10-CM

## 2020-11-13 DIAGNOSIS — Z74.09 DECREASED MOBILITY AND ENDURANCE: ICD-10-CM

## 2020-11-13 PROCEDURE — 97110 THERAPEUTIC EXERCISES: CPT | Mod: PN

## 2020-11-13 PROCEDURE — 97140 MANUAL THERAPY 1/> REGIONS: CPT | Mod: PN

## 2020-11-13 PROCEDURE — 97116 GAIT TRAINING THERAPY: CPT | Mod: PN

## 2020-11-13 NOTE — PROGRESS NOTES
Physical Therapy Treatment Note     Name: Ani Montoya  Clinic Number: [id number]    Therapy Diagnosis:   Encounter Diagnoses   Name Primary?    Decreased strength     Decreased range of motion (ROM) of right knee     Decreased mobility and endurance      Physician: Theodore Hannah NP    Visit Date: 11/13/2020    Physician Orders: PT Eval and Treat:     Weight bear as tolerated         Medical Diagnosis from Referral: S82.141A (ICD-10-CM) - Closed bicondylar fracture of right tibial plateau  Evaluation Date: 11/11/2020  Authorization Period Expiration: 12/31/2020  Plan of Care Expiration: 1/8/2021  Visit # / Visits authorized: 2/60  FOTO: 2/5  PTA Visit: 0/6      Time In: 7:00  Time Out: 7:45  Total Billable Time: 45 minutes    Precautions: Standard, Hypopituitarism, osteoporosis, WBAT on RLE    Subjective     Pt reports: she is feeling good. Most pain and stiffness in ankle today. Has been trying to put more weight through foot.  She was compliant with home exercise program.  Response to previous treatment: good  Functional change: able to walk better    Pain: 0/10  Location: right knee      Objective     Ani received therapeutic exercises to develop strength, endurance, ROM, flexibility, posture and core stabilization for 20 minutes including:  Ankle circles: 10x R  Ankle 4-way RTB 2x10  Bridges: 3x10 DL, cues for equal WB B and R hip lift  SLR: 3x10'' R 1#  Heel slides: 1x10'' R NT  Hip abd: 10x R 1#  Hip ext B 2x10  minisquat -cues for equal weight bearing     Ani received the following manual therapy techniques: Joint mobilizations were applied to the: R foot/ankle for 10 minutes, including:  Talocrural distraction grade III-V      Ani participated in gait training to improve functional mobility and safety for 15  minutes, including:  Gait training with quad cane->SPC 3 x 50ft  Fwd back sideways walking 10 ft 4xea    Home Exercises Provided and Patient Education Provided     Education  provided:   - HEP  -ambulation with cane    Written Home Exercises Provided: Patient instructed to cont prior HEP.  Exercises were reviewed and Ani was able to demonstrate them prior to the end of the session.  Ani demonstrated good  understanding of the education provided.     See EMR under Patient Instructions for exercises provided prior visit.    Assessment     Pt tolerated tx well. Pt with initial c/o ankle stiffness and pain with good response to talocrural mobilization which helped to improve pts tolerance for weight bearing during gait. Pt edu on appropriate gait pattern with cane and instructed to trial within the home. Pt requires cuing for hip shift during closed kinetic chain exercises. No increased pain post tx.   Ani is progressing well towards her goals.   Pt prognosis is Excellent.     Pt will continue to benefit from skilled outpatient physical therapy to address the deficits listed in the problem list box on initial evaluation, provide pt/family education and to maximize pt's level of independence in the home and community environment.     Pt's spiritual, cultural and educational needs considered and pt agreeable to plan of care and goals.     Anticipated barriers to physical therapy: none    GOALS: Short Term Goals:  4 weeks  1. Report decreased R knee and ankle pain </=2/10 in standing to increase tolerance for ADLs and increased QoL.  2. Increase R knee AROM to 0-145 degrees in order to be able to perform ADLs without difficulty.  3. Increase strength by 1/3 MMT grade in BLE  to increase tolerance for ADL and work activities.  4. Pt to tolerate HEP to improve ROM and independence with ADL's.     Long Term Goals: 8 weeks  1. Report decreased R ankle/knee pain </= 1-2/10 in walking and stair negotiation to increase tolerance for ADLs and increased QoL.  2. Patient goal: to walk and resume my normal ADLs  3. Increase strength to >/= 5/5 in BLE to increase tolerance for ADL and work  activities.  4. Pt will report at </= 30% impaired on KNEE FOTO to demonstrate increase in LE function with every day tasks.   5. Pt will negotiate 8 steps and ambulate community distances at >/= Mod I for increased functional mobility.  6. Pt will be able to squat, jog, and perform all work related activities for return to daily and work duties.    Plan     Cont to progress plan of care     AJIT JACKSON, PT

## 2020-11-17 ENCOUNTER — CLINICAL SUPPORT (OUTPATIENT)
Dept: REHABILITATION | Facility: HOSPITAL | Age: 30
End: 2020-11-17
Payer: COMMERCIAL

## 2020-11-17 DIAGNOSIS — R53.1 DECREASED STRENGTH: ICD-10-CM

## 2020-11-17 DIAGNOSIS — Z74.09 DECREASED MOBILITY AND ENDURANCE: ICD-10-CM

## 2020-11-17 DIAGNOSIS — M25.661 DECREASED RANGE OF MOTION (ROM) OF RIGHT KNEE: ICD-10-CM

## 2020-11-17 PROCEDURE — 97140 MANUAL THERAPY 1/> REGIONS: CPT | Mod: PN

## 2020-11-17 PROCEDURE — 97110 THERAPEUTIC EXERCISES: CPT | Mod: PN

## 2020-11-17 NOTE — PROGRESS NOTES
Physical Therapy Treatment Note     Name: Ani Montoya  Clinic Number: [id number]    Therapy Diagnosis:   Encounter Diagnoses   Name Primary?    Decreased strength     Decreased range of motion (ROM) of right knee     Decreased mobility and endurance      Physician: Theodore Hannah NP    Visit Date: 11/17/2020    Physician Orders: PT Eval and Treat:     Weight bear as tolerated         Medical Diagnosis from Referral: S82.141A (ICD-10-CM) - Closed bicondylar fracture of right tibial plateau  Evaluation Date: 11/11/2020  Authorization Period Expiration: 12/31/2020  Plan of Care Expiration: 1/8/2021  Visit # / Visits authorized: 3/60  FOTO: 3/5  PTA Visit: 0/6      Time In: 7:00  Time Out: 7:45  Total Billable Time: 45 minutes    Precautions: Standard, Hypopituitarism, osteoporosis, WBAT on RLE    Subjective     Pt reports:  She has been walking around home with cane most of the weekend. Ankle pain continues to be greater than knee pain. She reports she has some ankle swelling from standing a long time    She was compliant with home exercise program.  Response to previous treatment: good  Functional change: able to walk better    Pain: 0/10  Location: right knee      Objective     Ani received therapeutic exercises to develop strength, endurance, ROM, flexibility, posture and core stabilization for 30 minutes including:  Ankle circles: 10x R  Ankle 4-way RTB 2x10  Bridges: 3x10 DL, SL 10 R  SLR: 3x10' R 2#  Heel slides: 1x10'' R NT  Hip abd: 3x 10 R 2#  Hip ext B 2x10  minisquat -cues for equal weight bearing OOT  Heel raises 3x10  Leg press 30# DL 2x10, 20# SL R 2x8  Step up 4in 2x10  Step down 2x6 4 in with UE support    Next: mini lunge if tolerated and able to perform without compensation    Ani received the following manual therapy techniques: Joint mobilizations were applied to the: R foot/ankle for 10 minutes, including:  Talocrural distraction grade III-V  Posterior glide of talus grade  "II-III    Ani participated in gait training to improve functional mobility and safety for 00  minutes, including:  Gait training with quad cane->SPC 3 x 50ft  Fwd back sideways walking 10 ft 4xea    Ani participated in neuromuscular re-education activities to improve: Balance, Coordination, Kinesthetic, Proprioception and Posture for 5 minutes. The following activities were included:    Tandem stance 3x30"  nbos foam 30 sec EC      Home Exercises Provided and Patient Education Provided     Education provided:   - HEP  -ambulation with cane    Written Home Exercises Provided: Patient instructed to cont prior HEP.  Exercises were reviewed and Ain was able to demonstrate them prior to the end of the session.  Ani demonstrated good  understanding of the education provided.     See EMR under Patient Instructions for exercises provided prior visit.    Assessment     Pt tolerated tx well. Pt cont to respond well to ankle mobilization to decrease ankle stiffness and improve weight tolerance during ambulation. Pt progressing well with closed chain exercises, occasional cuing for equal weight bearing. Pt challenged with step exercises and demos compensation, may require lower step height next visit. introduced balance exercises for proprioception and stability with good susana. No increased pain post tx.   Ani is progressing well towards her goals.   Pt prognosis is Excellent.     Pt will continue to benefit from skilled outpatient physical therapy to address the deficits listed in the problem list box on initial evaluation, provide pt/family education and to maximize pt's level of independence in the home and community environment.     Pt's spiritual, cultural and educational needs considered and pt agreeable to plan of care and goals.     Anticipated barriers to physical therapy: none    GOALS: Short Term Goals:  4 weeks  1. Report decreased R knee and ankle pain </=2/10 in standing to increase tolerance for " ADLs and increased QoL.  2. Increase R knee AROM to 0-145 degrees in order to be able to perform ADLs without difficulty.  3. Increase strength by 1/3 MMT grade in BLE  to increase tolerance for ADL and work activities.  4. Pt to tolerate HEP to improve ROM and independence with ADL's.     Long Term Goals: 8 weeks  1. Report decreased R ankle/knee pain </= 1-2/10 in walking and stair negotiation to increase tolerance for ADLs and increased QoL.  2. Patient goal: to walk and resume my normal ADLs  3. Increase strength to >/= 5/5 in BLE to increase tolerance for ADL and work activities.  4. Pt will report at </= 30% impaired on KNEE FOTO to demonstrate increase in LE function with every day tasks.   5. Pt will negotiate 8 steps and ambulate community distances at >/= Mod I for increased functional mobility.  6. Pt will be able to squat, jog, and perform all work related activities for return to daily and work duties.    Plan     Cont to progress plan of care     AJIT JACKSON, PT

## 2020-11-18 ENCOUNTER — CLINICAL SUPPORT (OUTPATIENT)
Dept: REHABILITATION | Facility: HOSPITAL | Age: 30
End: 2020-11-18
Payer: COMMERCIAL

## 2020-11-18 DIAGNOSIS — Z74.09 DECREASED MOBILITY AND ENDURANCE: ICD-10-CM

## 2020-11-18 DIAGNOSIS — M25.661 DECREASED RANGE OF MOTION (ROM) OF RIGHT KNEE: ICD-10-CM

## 2020-11-18 DIAGNOSIS — R53.1 DECREASED STRENGTH: ICD-10-CM

## 2020-11-18 PROCEDURE — 97110 THERAPEUTIC EXERCISES: CPT | Mod: PN,CQ

## 2020-11-18 NOTE — PROGRESS NOTES
Physical Therapy Treatment Note     Name: Ani Montoya  Clinic Number: [id number]    Therapy Diagnosis:   Encounter Diagnoses   Name Primary?    Decreased strength     Decreased range of motion (ROM) of right knee     Decreased mobility and endurance      Physician: Theodore Hannah NP    Visit Date: 11/18/2020    Physician Orders: PT Eval and Treat:     Weight bear as tolerated         Medical Diagnosis from Referral: S82.141A (ICD-10-CM) - Closed bicondylar fracture of right tibial plateau  Evaluation Date: 11/11/2020  Authorization Period Expiration: 12/31/2020  Plan of Care Expiration: 1/8/2021  Visit # / Visits authorized: 4/60  FOTO: 4/5  PTA Visit: 1/6    Time In: 7:40 am  Time Out: 8:14 am  Total Billable Time: 34 minutes (TE-2)    Precautions: Standard, Hypopituitarism, osteoporosis, WBAT on RLE    Subjective     Pt reports: not having any pain in her knee but is having pain in her ankle/foot.    She was compliant with home exercise program.  Response to previous treatment: good  Functional change: able to walk better    Pain: 0/10 knee, 5/10 in foot/ankle  Location: right knee      Objective     Ani received therapeutic exercises to develop strength, endurance, ROM, flexibility, posture and core stabilization for 34 minutes including:    Ankle circles: 15x R  Ankle 4-way RTB 3x10  Bridges: 3x10 DL, SL 10 R  SLR: 3x10' R 2#  Heel slides: 1x10'' R OOT  Hip abd: 3x 10 R 2#  Hip ext B 3x10  minisquat -cues for equal weight bearing OOT  Heel raises 3x10  Leg press 30# DL 2x10, 20# SL R 2x10  Step up 4in 2x10  Step down 2x6 4 in with UE support    Next: mini lunge if tolerated and able to perform without compensation    Ani received the following manual therapy techniques: Joint mobilizations were applied to the: R foot/ankle for 0 minutes, including: NOT TODAY  Talocrural distraction grade III-V  Posterior glide of talus grade II-III    Ani participated in gait training to improve functional  "mobility and safety for 00  minutes, including:  Gait training with quad cane->SPC 3 x 50ft  Fwd back sideways walking 10 ft 4xea    Ani participated in neuromuscular re-education activities to improve: Balance, Coordination, Kinesthetic, Proprioception and Posture for 0 minutes. The following activities were included: NOT TODAY    Tandem stance 3x30"  nbos foam 30 sec EC      Home Exercises Provided and Patient Education Provided     Education provided:   - HEP  -ambulation with cane    Written Home Exercises Provided: Patient instructed to cont prior HEP.  Exercises were reviewed and Ani was able to demonstrate them prior to the end of the session.  Ani demonstrated good  understanding of the education provided.     See EMR under Patient Instructions for exercises provided prior visit.    Assessment     Patient ambulated into the clinic with a SPC.  Patient completed her exercises along with today's progressions with no increase in symptoms prior to leaving the clinic. Patient did not receive MT nor perform NMR due to arriving late to therapy.   Patient continues with difficulty with step downs due to complaint of pain in her knee.   Ani is progressing well towards her goals.   Pt prognosis is Excellent.     Pt will continue to benefit from skilled outpatient physical therapy to address the deficits listed in the problem list box on initial evaluation, provide pt/family education and to maximize pt's level of independence in the home and community environment.     Pt's spiritual, cultural and educational needs considered and pt agreeable to plan of care and goals.     Anticipated barriers to physical therapy: none    GOALS: Short Term Goals:  4 weeks  1. Report decreased R knee and ankle pain </=2/10 in standing to increase tolerance for ADLs and increased QoL.  2. Increase R knee AROM to 0-145 degrees in order to be able to perform ADLs without difficulty.  3. Increase strength by 1/3 MMT grade in BLE "  to increase tolerance for ADL and work activities.  4. Pt to tolerate HEP to improve ROM and independence with ADL's.     Long Term Goals: 8 weeks  1. Report decreased R ankle/knee pain </= 1-2/10 in walking and stair negotiation to increase tolerance for ADLs and increased QoL.  2. Patient goal: to walk and resume my normal ADLs  3. Increase strength to >/= 5/5 in BLE to increase tolerance for ADL and work activities.  4. Pt will report at </= 30% impaired on KNEE FOTO to demonstrate increase in LE function with every day tasks.   5. Pt will negotiate 8 steps and ambulate community distances at >/= Mod I for increased functional mobility.  6. Pt will be able to squat, jog, and perform all work related activities for return to daily and work duties.    Plan     Cont to progress plan of care.  Resume all exercises next visit.     Janes Pritchett, PTA

## 2020-11-18 NOTE — TELEPHONE ENCOUNTER
Received a call from pt.   Reports pain 10/10 in right calf.   + Tenderness to touch.   + swelling.   No redness.    Pt scheduled for u/s to rule out DVT   Pt requested u/s be done at [place].   Scheduled as requested.      Writer spoke with patient regarding results. Patient verbalized understanding and will follow up 11/19/20. Patient had no further questions or concerns.

## 2020-11-19 ENCOUNTER — TELEPHONE (OUTPATIENT)
Dept: ENDOCRINOLOGY | Facility: CLINIC | Age: 30
End: 2020-11-19

## 2020-11-19 DIAGNOSIS — E23.0 GROWTH HORMONE DEFICIENCY: Primary | ICD-10-CM

## 2020-11-19 RX ORDER — SOMATROPIN 5 MG/2ML
0.2 INJECTION, SOLUTION SUBCUTANEOUS DAILY
Qty: 4 ML | Refills: 6 | Status: SHIPPED | OUTPATIENT
Start: 2020-11-19 | End: 2021-01-08

## 2020-11-19 NOTE — TELEPHONE ENCOUNTER
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 Following with endo for management of her osteoporosis with recent pathological fracture and growth hormone deficiency     IGF-1 level 40  Will start Nutropin 0.2mg SQ daily  Prescription sent to [facility]  Patient notified    IGF-1 levels and TFT's to be checked in 2 months after starting medication  Will uptitrate Nutropin q2 months by 0.1 until reach an IGF-1 goal within the middle of the age adjusted normal range (Our goal will be IGF-1 level of 192)

## 2020-11-20 ENCOUNTER — CLINICAL SUPPORT (OUTPATIENT)
Dept: REHABILITATION | Facility: HOSPITAL | Age: 30
End: 2020-11-20
Payer: COMMERCIAL

## 2020-11-20 DIAGNOSIS — M25.661 DECREASED RANGE OF MOTION (ROM) OF RIGHT KNEE: ICD-10-CM

## 2020-11-20 DIAGNOSIS — Z74.09 DECREASED MOBILITY AND ENDURANCE: ICD-10-CM

## 2020-11-20 DIAGNOSIS — R53.1 DECREASED STRENGTH: ICD-10-CM

## 2020-11-20 PROCEDURE — 97116 GAIT TRAINING THERAPY: CPT | Mod: PN,CQ

## 2020-11-20 PROCEDURE — 97110 THERAPEUTIC EXERCISES: CPT | Mod: PN,CQ

## 2020-11-20 PROCEDURE — 97140 MANUAL THERAPY 1/> REGIONS: CPT | Mod: PN,CQ

## 2020-11-20 NOTE — PROGRESS NOTES
"    Physical Therapy Treatment Note     Name: Ani Montoya  Clinic Number: [id number]    Therapy Diagnosis:   Encounter Diagnoses   Name Primary?    Decreased strength     Decreased range of motion (ROM) of right knee     Decreased mobility and endurance      Physician: Theodore Hannah NP    Visit Date: 11/20/2020    Physician Orders: PT Eval and Treat:     Weight bear as tolerated         Medical Diagnosis from Referral: S82.141A (ICD-10-CM) - Closed bicondylar fracture of right tibial plateau  Evaluation Date: 11/11/2020  Authorization Period Expiration: 12/31/2020  Plan of Care Expiration: 1/8/2021  Visit # / Visits authorized: 5/60  FOTO: 5/5 Done  PTA Visit: 2/6    Time In: 7:50 am  Time Out: 8:50 am  Total Billable Time: 34 minutes (TE-2, 1 MT, 1GT)    Precautions: Standard, Hypopituitarism, osteoporosis, WBAT on RLE    Subjective     Pt reports: "Im have a little hurt in the bend of my foot". Pt agreeable to PT session   She was compliant with home exercise program.  Response to previous treatment: no adverse reaction  Functional change: able to walk better    Pain: 0/10 knee, 5/10 in foot/ankle  Location: right knee      Objective     Ani received therapeutic exercises to develop strength, endurance, ROM, flexibility, posture and core stabilization for 35 minutes including:    -Ankle circles: 15x R  -Ankle 4-way RTB 3x10 HEP  -Bridges: 3x10 DL, SL 10 R  -SLR: 3x10' R 2#  HEP  -Heel slides: 1x10'' R    -Hip abd: 3x 10 R   stand  -Hip ext B 3x10  stand  -minisquat -cues for equal weight bearing OOT  -Heel raises 3x10  Of foam  -Leg press 5.5 pl  DL 2x10, 20# SL R 2x10  -Step up 4in 2x10  -Step down 2x6 4 in with UE support  -Step through sequence on R  -weight shifting R/ L 5 sec ea x 5 trials  nbos foam 30 sec EC  Next: mini lunge if tolerated and able to perform without compensation    Ani received the following manual therapy techniques: Joint mobilizations were applied to the: R foot/ankle " for 10 minutes, including:   Talocrural distraction grade III  Posterior glide of talus grade II-III  STM to achilles tendon / tendon play    Ani participated in gait training to improve functional mobility and safety for 10  minutes, including:  Gait training with quad cane-> ft outdoors on level / unlevel surfaces  Up/ down standard curb, up/ down 4 step with use of Hand rail.       Home Exercises Provided and Patient Education Provided     Education provided:   - HEP  -ambulation with cane    Written Home Exercises Provided: Patient instructed to cont prior HEP.  Exercises were reviewed and Ani was able to demonstrate them prior to the end of the session.  Ani demonstrated good  understanding of the education provided.     See EMR under Patient Instructions for exercises provided prior visit.    Assessment     Pt able to complete today's session with no reports of increased R ankle/ foot pain noted. Pt reports that full weight bearing on R LE elicits pain in ankle/ dorsum of R foot. She was able to ambulate outdoors with no episodes of loss of balance or falls.   Ani is progressing well towards her goals.   Pt prognosis is Excellent.     Pt will continue to benefit from skilled outpatient physical therapy to address the deficits listed in the problem list box on initial evaluation, provide pt/family education and to maximize pt's level of independence in the home and community environment.     Pt's spiritual, cultural and educational needs considered and pt agreeable to plan of care and goals.     Anticipated barriers to physical therapy: none    GOALS: Short Term Goals:  4 weeks  1. Report decreased R knee and ankle pain </=2/10 in standing to increase tolerance for ADLs and increased QoL.(Ongoing, not met)  2. Increase R knee AROM to 0-145 degrees in order to be able to perform ADLs without difficulty.(Ongoing, not met)  3. Increase strength by 1/3 MMT grade in BLE  to increase tolerance for  ADL and work activities.(Ongoing, not met)  4. Pt to tolerate HEP to improve ROM and independence with ADL's.(Ongoing, not met)     Long Term Goals: 8 weeks  1. Report decreased R ankle/knee pain </= 1-2/10 in walking and stair negotiation to increase tolerance for ADLs and increased QoL.(Ongoing, not met)  2. Patient goal: to walk and resume my normal ADLs(Ongoing, not met)  3. Increase strength to >/= 5/5 in BLE to increase tolerance for ADL and work activities.(Ongoing, not met)  4. Pt will report at </= 30% impaired on KNEE FOTO to demonstrate increase in LE function with every day tasks. (Ongoing, not met)  5. Pt will negotiate 8 steps and ambulate community distances at >/= Mod I for increased functional mobility.(Ongoing, not met)  6. Pt will be able to squat, jog, and perform all work related activities for return to daily and work duties. (Ongoing, not met)    Plan     Cont to progress plan of care.   Advance standing activities as appropriate.     Lauri Liu, PTA

## 2020-11-25 ENCOUNTER — CLINICAL SUPPORT (OUTPATIENT)
Dept: REHABILITATION | Facility: HOSPITAL | Age: 30
End: 2020-11-25
Payer: COMMERCIAL

## 2020-11-25 DIAGNOSIS — Z74.09 DECREASED MOBILITY AND ENDURANCE: ICD-10-CM

## 2020-11-25 DIAGNOSIS — R53.1 DECREASED STRENGTH: ICD-10-CM

## 2020-11-25 DIAGNOSIS — M25.661 DECREASED RANGE OF MOTION (ROM) OF RIGHT KNEE: ICD-10-CM

## 2020-11-25 PROCEDURE — 97110 THERAPEUTIC EXERCISES: CPT | Mod: PN

## 2020-11-25 PROCEDURE — 97116 GAIT TRAINING THERAPY: CPT | Mod: PN

## 2020-11-25 NOTE — PROGRESS NOTES
Physical Therapy Treatment Note     Name: Ani Montoya  Clinic Number: [id number]    Therapy Diagnosis:   Encounter Diagnoses   Name Primary?    Decreased strength     Decreased range of motion (ROM) of right knee     Decreased mobility and endurance      Physician: Theodore Hannah NP    Visit Date: 11/25/2020    Physician Orders: PT Eval and Treat:     Weight bear as tolerated         Medical Diagnosis from Referral: S82.141A (ICD-10-CM) - Closed bicondylar fracture of right tibial plateau  Evaluation Date: 11/11/2020  Authorization Period Expiration: 12/31/2020  Plan of Care Expiration: 1/8/2021  Visit # / Visits authorized: 5/60  FOTO: 5/5 Done  PTA Visit: 2/6    Time In: 7:50 am  Time Out: 8:50 am  Total Billable Time: 60 minutes (TE-3, 1GT)    Precautions: Standard, Hypopituitarism, osteoporosis, WBAT on RLE    Subjective     Pt reports: R top of the foot pain and shin   She was compliant with home exercise program.  Response to previous treatment: no adverse reaction  Functional change: able to walk better    Pain: 0/10 knee, 3/10 in foot/ankle  Location: right knee      Objective     Ani received therapeutic exercises to develop strength, endurance, ROM, flexibility, posture and core stabilization for 50 minutes including:    Mat:  -Ankle circles: 30x R  -Bridges: 3x10 DL, SL 2x10 R  -Heel slides: 1x10'' R      Standing:  -Hip abd: 3x 10 R  -Hip ext: 3x10 B  -Minisquat: 30x cues for equal weight bearing OOT  -Heel raises: 3x10 of stair step, 10x R single leg  -Leg press: 5.5 pl  DL 2x10, 20# SL R 2x10  -Step ups: 4in 2x10  -Lateral step downs: 3x10 R on stair step, heavy cues for eccentric control and min UE use (may need to decrease to L1 next visit)  -NBOS foam 30 sec EC - not done today  -Lunges on stairs: 2x10 R    Ani received the following manual therapy techniques: Joint mobilizations were applied to the: R foot/ankle for 0 minutes, including:   Talocrural distraction grade  III  Posterior glide of talus grade II-III  STM to achilles tendon / tendon play    Ani participated in gait training to improve functional mobility and safety for 10 minutes, including:  Step through sequence on R, 3 trials  Weight shifting R/ L 5 sec ea x 5 trials  Gait training with quad cane-> ft outdoors on level / unlevel surfaces  Up/ down standard curb, up/ down 4 step with use of Hand rail.     Home Exercises Provided and Patient Education Provided   Education provided:   - HEP  - Ambulation with cane    Written Home Exercises Provided: Patient instructed to cont prior HEP.  Exercises were reviewed and Ani was able to demonstrate them prior to the end of the session.  Ani demonstrated good  understanding of the education provided.     See EMR under Patient Instructions for exercises provided prior visit.    Assessment     Pt progressing well in RLE weight bearing and weight bearing limitations more prominent with single leg activities especially lateral step down exercises. Mild knee pain during lateral step downs that quickly dissipated. Decreased R quad and calf strength present.    Ani is progressing well towards her goals.   Pt prognosis is Excellent.     Pt will continue to benefit from skilled outpatient physical therapy to address the deficits listed in the problem list box on initial evaluation, provide pt/family education and to maximize pt's level of independence in the home and community environment.     Pt's spiritual, cultural and educational needs considered and pt agreeable to plan of care and goals.     Anticipated barriers to physical therapy: none    GOALS: Short Term Goals:  4 weeks  1. Report decreased R knee and ankle pain </=2/10 in standing to increase tolerance for ADLs and increased QoL.(Ongoing, not met)  2. Increase R knee AROM to 0-145 degrees in order to be able to perform ADLs without difficulty.(Ongoing, not met)  3. Increase strength by 1/3 MMT grade in BLE   to increase tolerance for ADL and work activities.(Ongoing, not met)  4. Pt to tolerate HEP to improve ROM and independence with ADL's.(Ongoing, not met)     Long Term Goals: 8 weeks  1. Report decreased R ankle/knee pain </= 1-2/10 in walking and stair negotiation to increase tolerance for ADLs and increased QoL.(Ongoing, not met)  2. Patient goal: to walk and resume my normal ADLs(Ongoing, not met)  3. Increase strength to >/= 5/5 in BLE to increase tolerance for ADL and work activities.(Ongoing, not met)  4. Pt will report at </= 30% impaired on KNEE FOTO to demonstrate increase in LE function with every day tasks. (Ongoing, not met)  5. Pt will negotiate 8 steps and ambulate community distances at >/= Mod I for increased functional mobility.(Ongoing, not met)  6. Pt will be able to squat, jog, and perform all work related activities for return to daily and work duties. (Ongoing, not met)    Plan     Cont to progress plan of care.   Advance standing activities as appropriate.     Anthony Stein, PT

## 2020-12-01 ENCOUNTER — CLINICAL SUPPORT (OUTPATIENT)
Dept: REHABILITATION | Facility: HOSPITAL | Age: 30
End: 2020-12-01
Payer: COMMERCIAL

## 2020-12-01 DIAGNOSIS — M25.661 DECREASED RANGE OF MOTION (ROM) OF RIGHT KNEE: ICD-10-CM

## 2020-12-01 DIAGNOSIS — Z74.09 DECREASED MOBILITY AND ENDURANCE: ICD-10-CM

## 2020-12-01 DIAGNOSIS — R53.1 DECREASED STRENGTH: ICD-10-CM

## 2020-12-01 PROCEDURE — 97110 THERAPEUTIC EXERCISES: CPT | Mod: PN

## 2020-12-01 NOTE — PROGRESS NOTES
Physical Therapy Treatment Note     Name: Ani Montoya  Clinic Number: [id number]    Therapy Diagnosis:   Encounter Diagnoses   Name Primary?    Decreased strength     Decreased range of motion (ROM) of right knee     Decreased mobility and endurance      Physician: Theodore Hannah NP    Visit Date: 12/1/2020    Physician Orders: PT Eval and Treat:     Weight bear as tolerated         Medical Diagnosis from Referral: S82.141A (ICD-10-CM) - Closed bicondylar fracture of right tibial plateau  Evaluation Date: 11/11/2020  Authorization Period Expiration: 12/31/2020  Plan of Care Expiration: 1/8/2021  Visit # / Visits authorized: 6/60  FOTO: 5/5 Done  PTA Visit: 2/6    Time In: 7:05 am  Time Out: 7:45 am  Total Billable Time: 40 minutes (TE-3,)    Precautions: Standard, Hypopituitarism, osteoporosis, WBAT on RLE    Subjective     Pt reports: R top of the foot pain and ankle   She was compliant with home exercise program.  Response to previous treatment: no adverse reaction  Functional change: able to walk better    Pain: 0/10 knee, 3/10 in foot/ankle  Location: right knee      Objective     Ani received therapeutic exercises to develop strength, endurance, ROM, flexibility, posture and core stabilization for 40 minutes including:    Mat:  -Ankle circles: 30x R  -Bridges: 3x10 DL, SL 2x10 R  -Heel slides: 10x10'' R      Standing:  -Hip abd: 2x 10 B 20#  -Hip ext: 2x10 B 20#  -Minisquat: 30x green sports cord for cues for equal weight bearing OOT  -Heel raises: 3x10 of stair step, 10x R single leg; 1x10 DL up SL down  -Leg press: 5.5 pl  DL 2x10, 30# SL R 2x10  -Step ups: 6in 2x10  -Lateral step downs: 3x10 R on L1, heavy cues for eccentric control and min UE use   -NBOS foam 30 sec EC - not done today  -Lunges on stairs: 2x10 R    Ani received the following manual therapy techniques: Joint mobilizations were applied to the: R foot/ankle for 0 minutes, including:   Talocrural distraction grade  III  Posterior glide of talus grade II-III  STM to achilles tendon / tendon play    Ani participated in gait training to improve functional mobility and safety for 00 minutes, including:  Step through sequence on R, 3 trials  Weight shifting R/ L 5 sec ea x 5 trials  Gait training with quad cane-> ft outdoors on level / unlevel surfaces  Up/ down standard curb, up/ down 4 step with use of Hand rail.     Home Exercises Provided and Patient Education Provided   Education provided:   - HEP  - Ambulation with cane    Written Home Exercises Provided: Patient instructed to cont prior HEP.  Exercises were reviewed and Ani was able to demonstrate them prior to the end of the session.  Ani demonstrated good  understanding of the education provided.     See EMR under Patient Instructions for exercises provided prior visit.    Assessment     Pt tolerated tx well. Able to advance resistance with multiple exercises. Decreased step height for step downs with improving ability to control decent. Cont to work on event weight shift during squatting activities. Pt continues to demo decreased calf and quad strength. No increase in pain post tx.   Ani is progressing well towards her goals.   Pt prognosis is Excellent.     Pt will continue to benefit from skilled outpatient physical therapy to address the deficits listed in the problem list box on initial evaluation, provide pt/family education and to maximize pt's level of independence in the home and community environment.     Pt's spiritual, cultural and educational needs considered and pt agreeable to plan of care and goals.     Anticipated barriers to physical therapy: none    GOALS: Short Term Goals:  4 weeks  1. Report decreased R knee and ankle pain </=2/10 in standing to increase tolerance for ADLs and increased QoL.(Ongoing, not met)  2. Increase R knee AROM to 0-145 degrees in order to be able to perform ADLs without difficulty.(Ongoing, not met)  3.  Increase strength by 1/3 MMT grade in BLE  to increase tolerance for ADL and work activities.(Ongoing, not met)  4. Pt to tolerate HEP to improve ROM and independence with ADL's.(Ongoing, not met)     Long Term Goals: 8 weeks  1. Report decreased R ankle/knee pain </= 1-2/10 in walking and stair negotiation to increase tolerance for ADLs and increased QoL.(Ongoing, not met)  2. Patient goal: to walk and resume my normal ADLs(Ongoing, not met)  3. Increase strength to >/= 5/5 in BLE to increase tolerance for ADL and work activities.(Ongoing, not met)  4. Pt will report at </= 30% impaired on KNEE FOTO to demonstrate increase in LE function with every day tasks. (Ongoing, not met)  5. Pt will negotiate 8 steps and ambulate community distances at >/= Mod I for increased functional mobility.(Ongoing, not met)  6. Pt will be able to squat, jog, and perform all work related activities for return to daily and work duties. (Ongoing, not met)    Plan     Cont to progress plan of care.   Advance standing activities as appropriate.     AJIT JACKSON, PT

## 2020-12-02 ENCOUNTER — CLINICAL SUPPORT (OUTPATIENT)
Dept: REHABILITATION | Facility: HOSPITAL | Age: 30
End: 2020-12-02
Payer: COMMERCIAL

## 2020-12-02 DIAGNOSIS — M25.661 DECREASED RANGE OF MOTION (ROM) OF RIGHT KNEE: ICD-10-CM

## 2020-12-02 DIAGNOSIS — R53.1 DECREASED STRENGTH: ICD-10-CM

## 2020-12-02 DIAGNOSIS — Z74.09 DECREASED MOBILITY AND ENDURANCE: ICD-10-CM

## 2020-12-02 PROCEDURE — 97110 THERAPEUTIC EXERCISES: CPT | Mod: PN

## 2020-12-02 PROCEDURE — 97140 MANUAL THERAPY 1/> REGIONS: CPT | Mod: PN

## 2020-12-02 NOTE — PROGRESS NOTES
Physical Therapy Treatment Note     Name: Ani Montoya  Clinic Number: [id number]    Therapy Diagnosis:   Encounter Diagnoses   Name Primary?    Decreased strength     Decreased range of motion (ROM) of right knee     Decreased mobility and endurance      Physician: Theodore Hannah NP    Visit Date: 12/2/2020    Physician Orders: PT Eval and Treat:     Weight bear as tolerated         Medical Diagnosis from Referral: S82.141A (ICD-10-CM) - Closed bicondylar fracture of right tibial plateau  Evaluation Date: 11/11/2020  Authorization Period Expiration: 12/31/2020  Plan of Care Expiration: 1/8/2021  Visit # / Visits authorized: 7/60  FOTO: 7/10  PTA Visit: 0/6    Time In: 7:55 am  Time Out: 7:40 am  Total Billable Time: 45 minutes (2 TE, 1 MT)    Precautions: Standard, Hypopituitarism, osteoporosis, WBAT on RLE    Subjective     Pt reports: R ankle pain and edema persists, no right knee pain   She was compliant with home exercise program.  Response to previous treatment: no adverse reaction  Functional change: able to walk better    Pain: 0/10 knee, 3/10 in foot/ankle  Location: right knee      Objective     Ani received therapeutic exercises to develop strength, endurance, ROM, flexibility, posture and core stabilization for 30 minutes including:  Prone:  Femoral nerve glides (saphenous branch): 30x R with towel roll under thigh and on elbows position    Standing:  -Hip abd: 2x 10 B 20# - not done today  -Hip ext: 2x10 B 20# - not done today  -Heel raises: 3x10 of stair step, 2x10 R single leg with L toe touch for assist; 1x10 DL up SL down  -Leg press: 5.5 pl  DL 2x10, 40# SL R 2x12  -Step ups: 6in 2x10 - not done today  -Lateral step downs: 2x20 R on L1, heavy cues for eccentric control and min UE use   -NBOS foam 30 sec EC - not done today  -Tandem walking on foam: 3 laps, forward/backward  -Lunges on BOSU ball: 2x10 R, cues for R ankle DF    Ani received the following manual therapy techniques:  Joint mobilizations were applied to the: R foot/ankle for 15 minutes, including:   Talocrural distraction grade III-IV  TC manipulation  Posterior glide of talus grade II-III  STM/MFR along track of R saphenous nerve and anterior tib, anterior and medial R knee along ORIF    Home Exercises Provided and Patient Education Provided   Education provided:   - HEP  - Ambulation with cane    Written Home Exercises Provided: Patient instructed to cont prior HEP.  Exercises were reviewed and nAi was able to demonstrate them prior to the end of the session.  Ani demonstrated good  understanding of the education provided.     See EMR under Patient Instructions for exercises provided prior visit.    Assessment     Potential R saphenous nerve adverse neural tension present from original injury and site of ORIF. Pt instructed to perform nerve glides at home multiple times a day. Multiple cavitations present in right foot with single leg raises with some discomfort at ball of foot. Moderate cueing with lateral step downs, and progress single leg strengthening.    Ani is progressing well towards her goals.   Pt prognosis is Excellent.     Pt will continue to benefit from skilled outpatient physical therapy to address the deficits listed in the problem list box on initial evaluation, provide pt/family education and to maximize pt's level of independence in the home and community environment.     Pt's spiritual, cultural and educational needs considered and pt agreeable to plan of care and goals.     Anticipated barriers to physical therapy: none    GOALS: Short Term Goals:  4 weeks  1. Report decreased R knee and ankle pain </=2/10 in standing to increase tolerance for ADLs and increased QoL.(Ongoing, not met)  2. Increase R knee AROM to 0-145 degrees in order to be able to perform ADLs without difficulty.(Ongoing, not met)  3. Increase strength by 1/3 MMT grade in BLE  to increase tolerance for ADL and work  activities.(Ongoing, not met)  4. Pt to tolerate HEP to improve ROM and independence with ADL's.(Ongoing, not met)     Long Term Goals: 8 weeks  1. Report decreased R ankle/knee pain </= 1-2/10 in walking and stair negotiation to increase tolerance for ADLs and increased QoL.(Ongoing, not met)  2. Patient goal: to walk and resume my normal ADLs(Ongoing, not met)  3. Increase strength to >/= 5/5 in BLE to increase tolerance for ADL and work activities.(Ongoing, not met)  4. Pt will report at </= 30% impaired on KNEE FOTO to demonstrate increase in LE function with every day tasks. (Ongoing, not met)  5. Pt will negotiate 8 steps and ambulate community distances at >/= Mod I for increased functional mobility.(Ongoing, not met)  6. Pt will be able to squat, jog, and perform all work related activities for return to daily and work duties. (Ongoing, not met)    Plan     Cont to progress plan of care.   Advance standing activities as appropriate.     Anthony Stein, PT

## 2020-12-04 ENCOUNTER — CLINICAL SUPPORT (OUTPATIENT)
Dept: REHABILITATION | Facility: HOSPITAL | Age: 30
End: 2020-12-04
Payer: COMMERCIAL

## 2020-12-04 DIAGNOSIS — Z74.09 DECREASED MOBILITY AND ENDURANCE: ICD-10-CM

## 2020-12-04 DIAGNOSIS — M25.661 DECREASED RANGE OF MOTION (ROM) OF RIGHT KNEE: ICD-10-CM

## 2020-12-04 DIAGNOSIS — R53.1 DECREASED STRENGTH: ICD-10-CM

## 2020-12-04 PROCEDURE — 97110 THERAPEUTIC EXERCISES: CPT | Mod: PN

## 2020-12-04 PROCEDURE — 97140 MANUAL THERAPY 1/> REGIONS: CPT | Mod: PN

## 2020-12-04 NOTE — PROGRESS NOTES
Physical Therapy Treatment Note     Name: Ani Montoya  Clinic Number: [id number]    Therapy Diagnosis:   Encounter Diagnoses   Name Primary?    Decreased strength     Decreased range of motion (ROM) of right knee     Decreased mobility and endurance      Physician: Theodore Hannah NP    Visit Date: 12/4/2020    Physician Orders: PT Eval and Treat:     Weight bear as tolerated         Medical Diagnosis from Referral: S82.141A (ICD-10-CM) - Closed bicondylar fracture of right tibial plateau  Evaluation Date: 11/11/2020  Authorization Period Expiration: 12/31/2020  Plan of Care Expiration: 1/8/2021  Visit # / Visits authorized: 9/60  FOTO: 8/10  PTA Visit: 0/6    Time In: 7:55 am  Time Out: 8:30 am  Total Billable Time: 35 minutes (1TE, 1 MT)    Precautions: Standard, Hypopituitarism, osteoporosis, WBAT on RLE    Subjective     Pt reports: Pt has R ankle pain, no reports of knee pain. Pt needs to elevate LE at night after standing a while. Pt reports tingling in lateral LE yesterday.   She was compliant with home exercise program.  Response to previous treatment: no adverse reaction  Functional change: able to walk better    Pain: 0/10 knee, 3/10 in foot/ankle  Location: right knee      Objective     Ani received therapeutic exercises to develop strength, endurance, ROM, flexibility, posture and core stabilization for 20 minutes including:  Prone:  Femoral nerve glides (saphenous branch): 30x R with towel roll under thigh and on elbows position NT    Standing:  -Hip abd: 2x 10 B 20# - not done today  -Hip ext: 2x10 B 20# - not done today  -Heel raises: 3x10 of stair step, 2x10 R single leg with L toe touch for assist; 1x10 DL up SL down  -Leg press: 6 pl  DL 2x10, 40# SL R 2x12  -Step ups: 6in 2x10 - not done today  -Lateral step downs: 2x20 R on L1, heavy cues for eccentric control and min UE use   -NBOS foam 30 sec EC - not done today  -Tandem walking on foam: 3 laps, forward/backward  -Lunges on BOSU  ball: 2x10 R, cues for R ankle DF  -squats bosu 2x10 min UE support as needed    Ani received the following manual therapy techniques: Joint mobilizations were applied to the: R foot/ankle for 15 minutes, including:   Talocrural distraction grade III-IV  TC manipulation NT  Posterior glide of talus grade II-III  STM/MFR along track of R saphenous nerve and anterior tib, anterior and medial R knee along ORIF NT  Proximal and distal fibular glides grade II-III    Home Exercises Provided and Patient Education Provided   Education provided:   - HEP  - Ambulation with cane    Written Home Exercises Provided: Patient instructed to cont prior HEP.  Exercises were reviewed and Ani was able to demonstrate them prior to the end of the session.  Ani demonstrated good  understanding of the education provided.     See EMR under Patient Instructions for exercises provided prior visit.    Assessment     Pt tolerated tx well. Pt requires mod cuing with step down. Mild anterior knee pain with step down. Pt with improving ability to perform SL calf raise. Pt progressing well with balance, strengthening and ROM exercises. Fibular glides added to improve tibiofibular joint mobility.     Ani is progressing well towards her goals.   Pt prognosis is Excellent.     Pt will continue to benefit from skilled outpatient physical therapy to address the deficits listed in the problem list box on initial evaluation, provide pt/family education and to maximize pt's level of independence in the home and community environment.     Pt's spiritual, cultural and educational needs considered and pt agreeable to plan of care and goals.     Anticipated barriers to physical therapy: none    GOALS: Short Term Goals:  4 weeks  1. Report decreased R knee and ankle pain </=2/10 in standing to increase tolerance for ADLs and increased QoL.(Ongoing, not met)  2. Increase R knee AROM to 0-145 degrees in order to be able to perform ADLs without  difficulty.(Ongoing, not met)  3. Increase strength by 1/3 MMT grade in BLE  to increase tolerance for ADL and work activities.(Ongoing, not met)  4. Pt to tolerate HEP to improve ROM and independence with ADL's.(Ongoing, not met)     Long Term Goals: 8 weeks  1. Report decreased R ankle/knee pain </= 1-2/10 in walking and stair negotiation to increase tolerance for ADLs and increased QoL.(Ongoing, not met)  2. Patient goal: to walk and resume my normal ADLs(Ongoing, not met)  3. Increase strength to >/= 5/5 in BLE to increase tolerance for ADL and work activities.(Ongoing, not met)  4. Pt will report at </= 30% impaired on KNEE FOTO to demonstrate increase in LE function with every day tasks. (Ongoing, not met)  5. Pt will negotiate 8 steps and ambulate community distances at >/= Mod I for increased functional mobility.(Ongoing, not met)  6. Pt will be able to squat, jog, and perform all work related activities for return to daily and work duties. (Ongoing, not met)    Plan     Cont to progress plan of care.   Advance standing activities as appropriate.     AJIT JACKSON, PT

## 2020-12-08 ENCOUNTER — PATIENT MESSAGE (OUTPATIENT)
Dept: ORTHOPEDICS | Facility: CLINIC | Age: 30
End: 2020-12-08

## 2020-12-08 ENCOUNTER — CLINICAL SUPPORT (OUTPATIENT)
Dept: REHABILITATION | Facility: HOSPITAL | Age: 30
End: 2020-12-08
Payer: COMMERCIAL

## 2020-12-08 DIAGNOSIS — M25.661 DECREASED RANGE OF MOTION (ROM) OF RIGHT KNEE: ICD-10-CM

## 2020-12-08 DIAGNOSIS — Z74.09 DECREASED MOBILITY AND ENDURANCE: ICD-10-CM

## 2020-12-08 DIAGNOSIS — R53.1 DECREASED STRENGTH: ICD-10-CM

## 2020-12-08 PROCEDURE — 97110 THERAPEUTIC EXERCISES: CPT | Mod: PN

## 2020-12-08 NOTE — PROGRESS NOTES
Physical Therapy Treatment Note     Name: Ani Montoya  [facility] Number: [id number]    Therapy Diagnosis:   Encounter Diagnoses   Name Primary?    Decreased strength     Decreased range of motion (ROM) of right knee     Decreased mobility and endurance      Physician: Theodore Hannah NP    Visit Date: 12/8/2020    Physician Orders: PT Eval and Treat:     Weight bear as tolerated         Medical Diagnosis from Referral: S82.141A (ICD-10-CM) - Closed bicondylar fracture of right tibial plateau  Evaluation Date: 11/11/2020  Authorization Period Expiration: 12/31/2020  Plan of Care Expiration: 1/8/2021  Visit # / Visits authorized: 9/60  FOTO: 8/10  PTA Visit: 0/6    Time In: 8:00 am  Time Out: 8:30 am  Total Billable Time: 30 minutes (2TE)    Precautions: Standard, Hypopituitarism, osteoporosis, WBAT on RLE    Subjective     Pt reports: Pt reports medial knee pain since Saturday along incision site Mild n/t in lateral LE.  Pt reports pain with weight bearing, able to walk but 3/10 pain. Pt reports wearing small block heel on Saturday.    She was compliant with home exercise program.   Response to previous treatment: no adverse reaction  Functional change: able to walk better    Pain: 3/10 knee, 2/10 in foot/ankle  Location: right knee      Objective     Mild warmth and minimal redness down shin, moderate tenderness to palpation along incision and along anterior tibia, no calf tenderness or pitting edema    R 37 cm mid patella 31cm 10 cm below  L 36 cm mid patella, 31cm 10 cm below    Ani received therapeutic exercises to develop strength, endurance, ROM, flexibility, posture and core stabilization for 20 minutes including:  Bold exercises performed    Prone:  Femoral nerve glides (saphenous branch): 30x R with towel roll under thigh and on elbows position NT     Heel slides 20x  SLR 3x10   Bridges 3x10     Standing:  -Hip abd: 2x 10 R 20#   -Hip ext: 2x10 R 20#   -Heel raises: 3x10 (NT: of stair step, 2x10 R  single leg with L toe touch for assist; 1x10 DL up SL down)  Mini squat within pain free range (45 deg) 3x10     NOT today   -Leg press: 6 pl  DL 2x10, 40# SL R 2x12  -Step ups: 6in 2x10 - not done today  -Lateral step downs: 2x20 R on L1, heavy cues for eccentric control and min UE use   -NBOS foam 30 sec EC - not done today  -Tandem walking on foam: 3 laps, forward/backward  -Lunges on BOSU ball: 2x10 R, cues for R ankle DF  -squats bosu 2x10 min UE support as needed    Ani received the following manual therapy techniques: Joint mobilizations were applied to the: R foot/ankle for 00 minutes, including:   Talocrural distraction grade III-IV  TC manipulation NT  Posterior glide of talus grade II-III  STM/MFR along track of R saphenous nerve and anterior tib, anterior and medial R knee along ORIF NT  Proximal and distal fibular glides grade II-III    Home Exercises Provided and Patient Education Provided   Education provided:   - HEP  - Ambulation with cane    Written Home Exercises Provided: Patient instructed to cont prior HEP.  Exercises were reviewed and Ani was able to demonstrate them prior to the end of the session.  Ani demonstrated good  understanding of the education provided.     See EMR under Patient Instructions for exercises provided prior visit.    Assessment   Pt with increased pain at incision and in lower leg and pain with weight bearing. Mild warmth and redness and 1cm swelling at mid patella. Pt edu on signs of infection and to go to ED with worsening of sx. Will continue to monitor and reassess at next treatment tomorrow. Pt seeing primary therapist next (tomorrow) and was informed of these changes. Performed gentle ROM and strengthening within pain free range without increase in pain post tx.   Ankle pain improving. Continue to monitor and resume exercises as appropriate.      Ani is progressing well towards her goals.   Pt prognosis is Excellent.     Pt will continue to benefit from  skilled outpatient physical therapy to address the deficits listed in the problem list box on initial evaluation, provide pt/family education and to maximize pt's level of independence in the home and community environment.     Pt's spiritual, cultural and educational needs considered and pt agreeable to plan of care and goals.     Anticipated barriers to physical therapy: none    GOALS: Short Term Goals:  4 weeks  1. Report decreased R knee and ankle pain </=2/10 in standing to increase tolerance for ADLs and increased QoL.(Ongoing, not met)  2. Increase R knee AROM to 0-145 degrees in order to be able to perform ADLs without difficulty.(Ongoing, not met)  3. Increase strength by 1/3 MMT grade in BLE  to increase tolerance for ADL and work activities.(Ongoing, not met)  4. Pt to tolerate HEP to improve ROM and independence with ADL's.(Ongoing, not met)     Long Term Goals: 8 weeks  1. Report decreased R ankle/knee pain </= 1-2/10 in walking and stair negotiation to increase tolerance for ADLs and increased QoL.(Ongoing, not met)  2. Patient goal: to walk and resume my normal ADLs(Ongoing, not met)  3. Increase strength to >/= 5/5 in BLE to increase tolerance for ADL and work activities.(Ongoing, not met)  4. Pt will report at </= 30% impaired on KNEE FOTO to demonstrate increase in LE function with every day tasks. (Ongoing, not met)  5. Pt will negotiate 8 steps and ambulate community distances at >/= Mod I for increased functional mobility.(Ongoing, not met)  6. Pt will be able to squat, jog, and perform all work related activities for return to daily and work duties. (Ongoing, not met)    Plan   Monitor sx.  Cont to progress plan of care.   Advance standing activities as appropriate.     AJIT JACKSON, PT

## 2020-12-09 ENCOUNTER — TELEPHONE (OUTPATIENT)
Dept: ORTHOPEDICS | Facility: CLINIC | Age: 30
End: 2020-12-09

## 2020-12-09 ENCOUNTER — OFFICE VISIT (OUTPATIENT)
Dept: ORTHOPEDICS | Facility: CLINIC | Age: 30
End: 2020-12-09
Payer: COMMERCIAL

## 2020-12-09 ENCOUNTER — HOSPITAL ENCOUNTER (OUTPATIENT)
Dept: RADIOLOGY | Facility: HOSPITAL | Age: 30
Discharge: HOME | End: 2020-12-09
Attending: NURSE PRACTITIONER
Payer: COMMERCIAL

## 2020-12-09 VITALS — TEMPERATURE: 98 F

## 2020-12-09 DIAGNOSIS — M79.89 RIGHT LEG SWELLING: ICD-10-CM

## 2020-12-09 DIAGNOSIS — S82.141A CLOSED BICONDYLAR FRACTURE OF RIGHT TIBIAL PLATEAU: ICD-10-CM

## 2020-12-09 DIAGNOSIS — S82.141A CLOSED BICONDYLAR FRACTURE OF RIGHT TIBIAL PLATEAU: Primary | ICD-10-CM

## 2020-12-09 DIAGNOSIS — M79.89 RIGHT LEG SWELLING: Primary | ICD-10-CM

## 2020-12-09 PROCEDURE — 99999 PR PBB SHADOW E&M-EST. PATIENT-LVL III: CPT | Mod: PBBFAC,,, | Performed by: NURSE PRACTITIONER

## 2020-12-09 PROCEDURE — 73590 XR TIBIA FIBULA 2 VIEW RIGHT: ICD-10-PCS | Mod: 26,RT,, | Performed by: RADIOLOGY

## 2020-12-09 PROCEDURE — 73560 X-RAY EXAM OF KNEE 1 OR 2: CPT | Mod: 26,RT,, | Performed by: RADIOLOGY

## 2020-12-09 PROCEDURE — 99999 PR PBB SHADOW E&M-EST. PATIENT-LVL III: ICD-10-PCS | Mod: PBBFAC,,, | Performed by: NURSE PRACTITIONER

## 2020-12-09 PROCEDURE — 1125F PR PAIN SEVERITY QUANTIFIED, PAIN PRESENT: ICD-10-PCS | Mod: S$GLB,,, | Performed by: NURSE PRACTITIONER

## 2020-12-09 PROCEDURE — 73560 XR KNEE 1 OR 2 VIEW RIGHT: ICD-10-PCS | Mod: 26,RT,, | Performed by: RADIOLOGY

## 2020-12-09 PROCEDURE — 99214 OFFICE O/P EST MOD 30 MIN: CPT | Mod: S$GLB,,, | Performed by: NURSE PRACTITIONER

## 2020-12-09 PROCEDURE — 73590 X-RAY EXAM OF LOWER LEG: CPT | Mod: TC,RT

## 2020-12-09 PROCEDURE — 1125F AMNT PAIN NOTED PAIN PRSNT: CPT | Mod: S$GLB,,, | Performed by: NURSE PRACTITIONER

## 2020-12-09 PROCEDURE — 73590 X-RAY EXAM OF LOWER LEG: CPT | Mod: 26,RT,, | Performed by: RADIOLOGY

## 2020-12-09 PROCEDURE — 73560 X-RAY EXAM OF KNEE 1 OR 2: CPT | Mod: TC,RT

## 2020-12-09 PROCEDURE — 99214 PR OFFICE/OUTPT VISIT, EST, LEVL IV, 30-39 MIN: ICD-10-PCS | Mod: S$GLB,,, | Performed by: NURSE PRACTITIONER

## 2020-12-09 NOTE — PROGRESS NOTES
Ms. Montoya is here today for a post-operative visit after undergoing an ORIF for her right bicondylar tibial plateau fracture by Dr. Ortega on 8/18/2020.    Interval History:  She comes in today with c/c right lower leg pain and swelling over the past week.  She denies falls or injuries.  States her swelling extends down to her foot and ankle and has noticed her skin would become shinning.  She Denies fever or chills.  She has used Aleve PRN with some improvement but not complete improvement.  She is walking with a cane currently.  She has not returned to work yet.  She denies tingling or numbness sensation to her lower leg.         Physical exam:  Incision to medial knee is open to air and healed.  There is no drainage or redness.  She has tactile stimulation.  PPP x 2.  She can flex and extend knee from 0-110 degrees.  There is some mild edema to her lower leg when compared to the left.  No warmth or erythema noted.  Her ROM of ankle is unremarkable.      RADS: right tib/fib and right knee x-ray obtained and personally reviewed by me.  Hardware to right proximal tibial plateau fracture is in good position without evidence of hardware failure.  No new fractures seen.      Assessment:  Post-op visit (16 weeks)    Plan:    ICD-10-CM ICD-9-CM   1. Closed bicondylar fracture of right tibial plateau  S82.141A 823.00   2. Right leg swelling  M79.89 729.81     Current care, treatment plan, precautions, activity level/ modifications, limitations, rehabilitation exercises and proposed future treatment were discussed with the patient. We discussed the need to monitor for changes in symptoms and condition and report them to the physician.  Discussed importance of compliance with all appointments and follow up examinations.       - Pain medication: refill was not needed.  - Pain medication refill policy provided to patient for review, yes   - She is currently on Estrogen for birth control.  - Will order CBC, ESR, CRP and  ultrasound today.  - Advised hardware and fracture appears stable.  - Further recommendations pending outcome of lab and ultrasound.       If there are any questions prior to scheduled follow up, the patient was instructed to contact the office

## 2020-12-09 NOTE — TELEPHONE ENCOUNTER
Called patient with lab results.  WBC and ESR is normal.  CRP is 22.  Awaiting results of ultrasound.

## 2020-12-10 ENCOUNTER — HOSPITAL ENCOUNTER (OUTPATIENT)
Dept: CARDIOLOGY | Facility: HOSPITAL | Age: 30
Discharge: HOME | End: 2020-12-10
Attending: NURSE PRACTITIONER
Payer: COMMERCIAL

## 2020-12-10 DIAGNOSIS — M79.89 RIGHT LEG SWELLING: ICD-10-CM

## 2020-12-10 PROCEDURE — 93971 CV US LOWER VENOUS INSUFFICIENCY RIGHT: ICD-10-PCS | Mod: 26,RT,, | Performed by: INTERNAL MEDICINE

## 2020-12-10 PROCEDURE — 93971 EXTREMITY STUDY: CPT | Mod: 26,RT,, | Performed by: INTERNAL MEDICINE

## 2020-12-10 PROCEDURE — 93971 EXTREMITY STUDY: CPT | Mod: TC,RT

## 2020-12-11 ENCOUNTER — TELEPHONE (OUTPATIENT)
Dept: ORTHOPEDICS | Facility: CLINIC | Age: 30
End: 2020-12-11

## 2020-12-11 ENCOUNTER — CLINICAL SUPPORT (OUTPATIENT)
Dept: REHABILITATION | Facility: HOSPITAL | Age: 30
End: 2020-12-11
Payer: COMMERCIAL

## 2020-12-11 DIAGNOSIS — M25.661 DECREASED RANGE OF MOTION (ROM) OF RIGHT KNEE: ICD-10-CM

## 2020-12-11 DIAGNOSIS — Z74.09 DECREASED MOBILITY AND ENDURANCE: ICD-10-CM

## 2020-12-11 DIAGNOSIS — R53.1 DECREASED STRENGTH: ICD-10-CM

## 2020-12-11 LAB
RIGHT GIAC DIA: 0.2 MM
RIGHT GREAT SAPHENOUS DISTAL THIGH DIA: 0.33 CM
RIGHT GREAT SAPHENOUS JUNCTION DIA: 0.45 CM
RIGHT GREAT SAPHENOUS KNEE DIA: 0.37 CM
RIGHT GREAT SAPHENOUS MIDDLE THIGH DIA: 0.4 CM
RIGHT GREAT SAPHENOUS PROXIMAL CALF DIA: 0.32 CM

## 2020-12-11 PROCEDURE — 97110 THERAPEUTIC EXERCISES: CPT | Mod: PN

## 2020-12-11 NOTE — PROGRESS NOTES
Physical Therapy Treatment Note     Name: Ani Montoya  Clinic Number: [id number]    Therapy Diagnosis:   Encounter Diagnoses   Name Primary?    Decreased strength     Decreased range of motion (ROM) of right knee     Decreased mobility and endurance      Physician: Theodore Hannah NP    Visit Date: 12/11/2020    Physician Orders: PT Eval and Treat:     Weight bear as tolerated         Medical Diagnosis from Referral: S82.141A (ICD-10-CM) - Closed bicondylar fracture of right tibial plateau  Evaluation Date: 11/11/2020  Authorization Period Expiration: 12/31/2020  Plan of Care Expiration: 1/8/2021  Visit # / Visits authorized: 11/60  FOTO: 11/10  PTA Visit: 0/6    Time In: 8:10 am  Time Out: 8:30 am  Total Billable Time: 20 minutes (1 TE)    Precautions: Standard, Hypopituitarism, osteoporosis, WBAT on RLE    Subjective     Pt reports: pt reports she received an ultrasound and blood work the other day. She states her CRP was double the normal range and has not heard any news from the ultrasound just yet. She noticed more swelling in her foot starting at the beginning of this week, and she not not recount any particular reasons why. Mild pain and R foot edema today, reports some lateral upper calf tinging that remains unchanged since the surgery.   She was compliant with home exercise program.   Response to previous treatment: no adverse reaction  Functional change: able to walk better    Pain: 3/10 knee, 2/10 in foot/ankle  Location: right knee      Objective     Mild warmth and minimal redness down shin, moderate tenderness to palpation along incision and along anterior tibia, no calf tenderness or pitting edema    R 37 cm mid patella 31cm 10 cm below  L 36 cm mid patella, 31cm 10 cm below    Ani received therapeutic exercises to develop strength, endurance, ROM, flexibility, posture and core stabilization for 20 minutes including:    NuStep: 6', L1    Prone:  Femoral nerve glides (saphenous branch):  30x R with towel roll under thigh and on elbows position (resume next)    Heel slides 20x - not done today  SLR 3x10 - not done today  Bridges 3x10 - not done today    Standing:  -Hip abd: 2x 10 R 20#   -Hip ext: 2x10 R 20#   -Heel raises: 3x10 (NT: of stair step, 2x10 R single leg with L toe touch for assist; 1x10 DL up SL down)  -Mini squat within pain free range (45 deg) 3x10   -Leg press: 6 pl  DL 2x10, 40# SL R 2x12 (no SL today)    NOT today   -Step ups: 6in 2x10 - not done today  -Lateral step downs: 2x20 R on L1, heavy cues for eccentric control and min UE use   -NBOS foam 30 sec EC - not done today  -Tandem walking on foam: 3 laps, forward/backward  -Lunges on BOSU ball: 2x10 R, cues for R ankle DF  -squats bosu 2x10 min UE support as needed    Ani received the following manual therapy techniques: Joint mobilizations were applied to the: R foot/ankle for 00 minutes, including:   Talocrural distraction grade III-IV  TC manipulation NT  Posterior glide of talus grade II-III  STM/MFR along track of R saphenous nerve and anterior tib, anterior and medial R knee along ORIF NT  Proximal and distal fibular glides grade II-III    Home Exercises Provided and Patient Education Provided   Education provided:   - HEP  - Ambulation with cane    Written Home Exercises Provided: Patient instructed to cont prior HEP.  Exercises were reviewed and Ani was able to demonstrate them prior to the end of the session.  Ani demonstrated good  understanding of the education provided.     See EMR under Patient Instructions for exercises provided prior visit.    Assessment   Mild warmth, mild redness at R knee and lower leg, sensitivity at R shin and top of foot, tingling at right lateral upper calf, and some moderate pain especially with first couple of steps for right foot and knee now. Pain improves with mobility at first though limitations due to pain remain if she overworks it at home. She reports she stays busy at home  now that she can placed weight on her right leg and no longer really uses her cane anymore despite noticing it harder/painful to put weight on her right foot and knee. Pt educated on taking it easy on her RLE for about a week and using cane when needed to offload RLE, rhythmic motions such as stationary bike recommended, and progress per pt tolerance next week. Monitor symptoms and check test results for next visit.     Ani is progressing well towards her goals.   Pt prognosis is Excellent.     Pt will continue to benefit from skilled outpatient physical therapy to address the deficits listed in the problem list box on initial evaluation, provide pt/family education and to maximize pt's level of independence in the home and community environment.     Pt's spiritual, cultural and educational needs considered and pt agreeable to plan of care and goals.     Anticipated barriers to physical therapy: none    GOALS: Short Term Goals:  4 weeks  1. Report decreased R knee and ankle pain </=2/10 in standing to increase tolerance for ADLs and increased QoL.(Ongoing, not met)  2. Increase R knee AROM to 0-145 degrees in order to be able to perform ADLs without difficulty.(Ongoing, not met)  3. Increase strength by 1/3 MMT grade in BLE  to increase tolerance for ADL and work activities.(Ongoing, not met)  4. Pt to tolerate HEP to improve ROM and independence with ADL's.(Ongoing, not met)     Long Term Goals: 8 weeks  1. Report decreased R ankle/knee pain </= 1-2/10 in walking and stair negotiation to increase tolerance for ADLs and increased QoL.(Ongoing, not met)  2. Patient goal: to walk and resume my normal ADLs(Ongoing, not met)  3. Increase strength to >/= 5/5 in BLE to increase tolerance for ADL and work activities.(Ongoing, not met)  4. Pt will report at </= 30% impaired on KNEE FOTO to demonstrate increase in LE function with every day tasks. (Ongoing, not met)  5. Pt will negotiate 8 steps and ambulate community  distances at >/= Mod I for increased functional mobility.(Ongoing, not met)  6. Pt will be able to squat, jog, and perform all work related activities for return to daily and work duties. (Ongoing, not met)    Plan   Monitor sx.  Cont to progress plan of care.   Advance standing activities as appropriate.     Anthony Stein, PT

## 2020-12-11 NOTE — TELEPHONE ENCOUNTER
Patient called with ultrasound results.  Results show no DVT or reflux. Recommend she wear a compression hose and elevated her extremity.  Also discussed her swelling could be from overuse, she said that is what the therapist told her.  Will see her on 12/21 as scheduled.  All questions answered.

## 2020-12-15 ENCOUNTER — TELEPHONE (OUTPATIENT)
Dept: REHABILITATION | Facility: HOSPITAL | Age: 30
End: 2020-12-15

## 2020-12-15 NOTE — TELEPHONE ENCOUNTER
Called pt about missed 7:00AM appt 12/15/20. No answer, left voicemail. Pt informed of next visit tomorrow at 7:45 AM.    Evelin Castañeda PT, DPT

## 2020-12-16 ENCOUNTER — CLINICAL SUPPORT (OUTPATIENT)
Dept: REHABILITATION | Facility: HOSPITAL | Age: 30
End: 2020-12-16
Payer: COMMERCIAL

## 2020-12-16 DIAGNOSIS — M25.661 DECREASED RANGE OF MOTION (ROM) OF RIGHT KNEE: ICD-10-CM

## 2020-12-16 DIAGNOSIS — Z74.09 DECREASED MOBILITY AND ENDURANCE: ICD-10-CM

## 2020-12-16 DIAGNOSIS — R53.1 DECREASED STRENGTH: ICD-10-CM

## 2020-12-16 PROCEDURE — 97110 THERAPEUTIC EXERCISES: CPT | Mod: PN

## 2020-12-16 NOTE — PROGRESS NOTES
Physical Therapy Treatment Note     Name: Ani Montoya  Clinic Number: [id number]    Therapy Diagnosis:   Encounter Diagnoses   Name Primary?    Decreased strength     Decreased range of motion (ROM) of right knee     Decreased mobility and endurance      Physician: Theodore Hannah NP    Visit Date: 12/16/2020    Physician Orders: PT Eval and Treat:     Weight bear as tolerated         Medical Diagnosis from Referral: S82.141A (ICD-10-CM) - Closed bicondylar fracture of right tibial plateau  Evaluation Date: 11/11/2020  Authorization Period Expiration: 12/31/2020  Plan of Care Expiration: 1/8/2021  Visit # / Visits authorized: 11/60  FOTO: 11/10 DONE today  PTA Visit: 0/6    Time In: 7:50 AM  Time Out: 8:30 AM  Total Billable Time: 40 minutes (3 TE)    Precautions: Standard, Hypopituitarism, osteoporosis, WBAT on RLE    Subjective     Pt reports: she has no pain in her right knee and foot, and was trying to rest it a lot more recently. She still has R foot swelling that is unchanged, and is considering a compression stocking.   She was compliant with home exercise program.   Response to previous treatment: no adverse reaction  Functional change: able to walk better    Pain: 0/10 knee, 0/10 in foot/ankle  Location: right leg    Objective   12/8/2020:  Mild warmth and minimal redness down shin, moderate tenderness to palpation along incision and along anterior tibia, no calf tenderness or pitting edema    R 37 cm mid patella 31cm 10 cm below  L 36 cm mid patella, 31cm 10 cm below      Ani received therapeutic exercises to develop strength, endurance, ROM, flexibility, posture and core stabilization for 40 minutes including:    Bike: next at L3 for 5-10 mins    Prone:  Femoral nerve glides (saphenous branch): 30x R with towel roll under thigh and on elbows position    Standing:  -Lunges on BOSU ball: 2x10 R, cues for R ankle DF  -Tandem walking on foam: 4 laps; forward/backwards  -Step ups: 2x10 B, 6 inch  step  -Heels raises: 2x20 DL off stair step  -Lateral step downs: 2x6 R on L4 inch step, cues for eccentric control and mild UE use     Not done today:  -Hip abd: 2x 10 R 20#   -Hip ext: 2x10 R 20#   -Mini squat within pain free range (45 deg) 3x10   -Leg press: 6 pl  DL 2x10, 40# SL R 2x12 (no SL today)    Ani received the following manual therapy techniques: Joint mobilizations were applied to the: R foot/ankle for 0 minutes, including:   Talocrural distraction grade III-IV  TC manipulation NT  Posterior glide of talus grade II-III  STM/MFR along track of R saphenous nerve and anterior tib, anterior and medial R knee along ORIF NT  Proximal and distal fibular glides grade II-III    Home Exercises Provided and Patient Education Provided   Education provided:   - HEP  - Ambulation with cane    Written Home Exercises Provided: Patient instructed to cont prior HEP.  Exercises were reviewed and Ani was able to demonstrate them prior to the end of the session.  Ani demonstrated good  understanding of the education provided.     See EMR under Patient Instructions for exercises provided prior visit.    Assessment   Pt did well today and reported no more than 2-3/10 right knee pain and no right foot pain throughout the session. No gait deviations present and baseline genu valgum present upon arrival. Increased time was specifically taken for patient breaks in order to not over-stress her right knee, and she will be slowly ramped back up as long as pain is below 4/10. Compression stocking was fitted and given to patient today for right foot swelling, monitor response next visit.      Ani is progressing well towards her goals.   Pt prognosis is Excellent.     Pt will continue to benefit from skilled outpatient physical therapy to address the deficits listed in the problem list box on initial evaluation, provide pt/family education and to maximize pt's level of independence in the home and community environment.      Pt's spiritual, cultural and educational needs considered and pt agreeable to plan of care and goals.     Anticipated barriers to physical therapy: none    GOALS: Short Term Goals:  4 weeks  1. Report decreased R knee and ankle pain </=2/10 in standing to increase tolerance for ADLs and increased QoL.(Ongoing, not met)  2. Increase R knee AROM to 0-145 degrees in order to be able to perform ADLs without difficulty.(Ongoing, not met)  3. Increase strength by 1/3 MMT grade in BLE  to increase tolerance for ADL and work activities.(Ongoing, not met)  4. Pt to tolerate HEP to improve ROM and independence with ADL's.(Ongoing, not met)     Long Term Goals: 8 weeks  1. Report decreased R ankle/knee pain </= 1-2/10 in walking and stair negotiation to increase tolerance for ADLs and increased QoL.(Ongoing, not met)  2. Patient goal: to walk and resume my normal ADLs(Ongoing, not met)  3. Increase strength to >/= 5/5 in BLE to increase tolerance for ADL and work activities.(Ongoing, not met)  4. Pt will report at </= 30% impaired on KNEE FOTO to demonstrate increase in LE function with every day tasks. (Ongoing, not met)  5. Pt will negotiate 8 steps and ambulate community distances at >/= Mod I for increased functional mobility.(Ongoing, not met)  6. Pt will be able to squat, jog, and perform all work related activities for return to daily and work duties. (Ongoing, not met)    Plan   Monitor sx.  Cont to progress plan of care.   Advance standing activities as appropriate.     Anthony Stein, PT

## 2020-12-18 ENCOUNTER — CLINICAL SUPPORT (OUTPATIENT)
Dept: REHABILITATION | Facility: HOSPITAL | Age: 30
End: 2020-12-18
Payer: COMMERCIAL

## 2020-12-18 DIAGNOSIS — M25.661 DECREASED RANGE OF MOTION (ROM) OF RIGHT KNEE: ICD-10-CM

## 2020-12-18 DIAGNOSIS — Z74.09 DECREASED MOBILITY AND ENDURANCE: ICD-10-CM

## 2020-12-18 DIAGNOSIS — R53.1 DECREASED STRENGTH: ICD-10-CM

## 2020-12-18 PROCEDURE — 97110 THERAPEUTIC EXERCISES: CPT | Mod: PN

## 2020-12-18 NOTE — PROGRESS NOTES
Physical Therapy Treatment Note     Name: Ani Montoya  Clinic Number: [id number]    Therapy Diagnosis:   Encounter Diagnoses   Name Primary?    Decreased strength     Decreased range of motion (ROM) of right knee     Decreased mobility and endurance      Physician: Theodore Hannah NP    Visit Date: 12/18/2020    Physician Orders: PT Eval and Treat:     Weight bear as tolerated         Medical Diagnosis from Referral: S82.141A (ICD-10-CM) - Closed bicondylar fracture of right tibial plateau  Evaluation Date: 11/11/2020  Authorization Period Expiration: 12/31/2020  Plan of Care Expiration: 1/8/2021  Visit # / Visits authorized: 13/60  FOTO: at d/c  PTA Visit: 0/6    Time In: 7:05 AM  Time Out: 7:45 AM  Total Billable Time: 40 minutes (3 TE)    Precautions: Standard, Hypopituitarism, osteoporosis, WBAT on RLE    Subjective     Pt reports: wearing compression stocking at night and no right knee pain today. Very little right knee stiffness   She was compliant with home exercise program.   Response to previous treatment: no adverse reaction  Functional change: able to walk better    Pain: 0/10 knee, 0/10 in foot/ankle  Location: right leg    Objective   12/8/2020:  Mild warmth and minimal redness down shin, moderate tenderness to palpation along incision and along anterior tibia, no calf tenderness or pitting edema    R 37 cm mid patella 31cm 10 cm below  L 36 cm mid patella, 31cm 10 cm below      Ani received therapeutic exercises to develop strength, endurance, ROM, flexibility, posture and core stabilization for 40 minutes including:    Bike: next at L3 for 5-10 mins    Supine:  SLR: 4x10 R, 3#  Superficial fibular nerve glides: 20x R    Prone:  Femoral nerve glides (saphenous branch): 30x R with towel roll under thigh and on elbows position    Standing:  -Lunges on BOSU ball: 3x10 R, cues for R ankle DF  -BOSU ball squats: 2x10 in // bars, min UE use  -Tandem walking on foam: 4 laps;  forward/backwards  -Lateral walking on foam: 2 laps with GTB  -Leg press: 6.5 pl  DL 3x10, 3 pl SL R 2x10  -Step ups: 2x10 B, 6 inch step    Not done today:  -Heels raises: 2x20 DL off stair step  -Lateral step downs: 2x6 R on L4 inch step, cues for eccentric control and mild UE use     Ani received the following manual therapy techniques: Joint mobilizations were applied to the: R foot/ankle for 0 minutes, including:   Talocrural distraction grade III-IV  TC manipulation NT  Posterior glide of talus grade II-III  STM/MFR along track of R saphenous nerve and anterior tib, anterior and medial R knee along ORIF NT  Proximal and distal fibular glides grade II-III    Home Exercises Provided and Patient Education Provided   Education provided:   - HEP  - Ambulation with cane    Written Home Exercises Provided: Patient instructed to cont prior HEP.  Exercises were reviewed and Ani was able to demonstrate them prior to the end of the session.  Ani demonstrated good  understanding of the education provided.     See EMR under Patient Instructions for exercises provided prior visit.    Assessment   Minimal right anterior knee pain with deep squatting activities such as the lunges and squat lateral walks with theraband. Pt progressing well overall. She ws given superficial fibular nerve glides today due to complaints of R lateral calf tingling discomfort that she notices now more since her anterior shin tingling/pain has resolved. Decreased weight for leg press today due to some lingering right knee pain. PT strongly advised pt to take at least 1-2 prolonged sitting breaks a day. Pt reports she stands up doing activities from 10 AM till 9 PM everyday, and she is a very active person typically.      Ani is progressing well towards her goals.   Pt prognosis is Excellent.     Pt will continue to benefit from skilled outpatient physical therapy to address the deficits listed in the problem list box on initial  evaluation, provide pt/family education and to maximize pt's level of independence in the home and community environment.     Pt's spiritual, cultural and educational needs considered and pt agreeable to plan of care and goals.     Anticipated barriers to physical therapy: none    GOALS: Short Term Goals:  4 weeks  1. Report decreased R knee and ankle pain </=2/10 in standing to increase tolerance for ADLs and increased QoL.(Ongoing, not met)  2. Increase R knee AROM to 0-145 degrees in order to be able to perform ADLs without difficulty.(Ongoing, not met)  3. Increase strength by 1/3 MMT grade in BLE  to increase tolerance for ADL and work activities.(Ongoing, not met)  4. Pt to tolerate HEP to improve ROM and independence with ADL's.(Ongoing, not met)     Long Term Goals: 8 weeks  1. Report decreased R ankle/knee pain </= 1-2/10 in walking and stair negotiation to increase tolerance for ADLs and increased QoL.(Ongoing, not met)  2. Patient goal: to walk and resume my normal ADLs(Ongoing, not met)  3. Increase strength to >/= 5/5 in BLE to increase tolerance for ADL and work activities.(Ongoing, not met)  4. Pt will report at </= 30% impaired on KNEE FOTO to demonstrate increase in LE function with every day tasks. (Ongoing, not met)  5. Pt will negotiate 8 steps and ambulate community distances at >/= Mod I for increased functional mobility.(Ongoing, not met)  6. Pt will be able to squat, jog, and perform all work related activities for return to daily and work duties. (Ongoing, not met)    Plan   Monitor sx.  Cont to progress plan of care.   Advance standing activities as appropriate.     Anthony Stein, PT

## 2020-12-21 ENCOUNTER — OFFICE VISIT (OUTPATIENT)
Dept: ORTHOPEDICS | Facility: CLINIC | Age: 30
End: 2020-12-21
Payer: COMMERCIAL

## 2020-12-21 VITALS — TEMPERATURE: 98 F | BODY MASS INDEX: 22.58 KG/M2 | HEIGHT: 59 IN | WEIGHT: 112 LBS

## 2020-12-21 DIAGNOSIS — Z98.890 POST-OPERATIVE STATE: ICD-10-CM

## 2020-12-21 DIAGNOSIS — S82.141A CLOSED BICONDYLAR FRACTURE OF RIGHT TIBIAL PLATEAU: ICD-10-CM

## 2020-12-21 PROCEDURE — 1125F PR PAIN SEVERITY QUANTIFIED, PAIN PRESENT: ICD-10-PCS | Mod: S$GLB,,, | Performed by: NURSE PRACTITIONER

## 2020-12-21 PROCEDURE — 3008F BODY MASS INDEX DOCD: CPT | Mod: CPTII,S$GLB,, | Performed by: NURSE PRACTITIONER

## 2020-12-21 PROCEDURE — 3008F PR BODY MASS INDEX (BMI) DOCUMENTED: ICD-10-PCS | Mod: CPTII,S$GLB,, | Performed by: NURSE PRACTITIONER

## 2020-12-21 PROCEDURE — 99213 OFFICE O/P EST LOW 20 MIN: CPT | Mod: S$GLB,,, | Performed by: NURSE PRACTITIONER

## 2020-12-21 PROCEDURE — 99999 PR PBB SHADOW E&M-EST. PATIENT-LVL III: ICD-10-PCS | Mod: PBBFAC,,, | Performed by: NURSE PRACTITIONER

## 2020-12-21 PROCEDURE — 99999 PR PBB SHADOW E&M-EST. PATIENT-LVL III: CPT | Mod: PBBFAC,,, | Performed by: NURSE PRACTITIONER

## 2020-12-21 PROCEDURE — 1125F AMNT PAIN NOTED PAIN PRSNT: CPT | Mod: S$GLB,,, | Performed by: NURSE PRACTITIONER

## 2020-12-21 PROCEDURE — 99213 PR OFFICE/OUTPT VISIT, EST, LEVL III, 20-29 MIN: ICD-10-PCS | Mod: S$GLB,,, | Performed by: NURSE PRACTITIONER

## 2020-12-21 RX ORDER — IBUPROFEN 600 MG/1
600 TABLET ORAL EVERY 6 HOURS PRN
Qty: 30 TABLET | Refills: 0 | Status: SHIPPED | OUTPATIENT
Start: 2020-12-21

## 2020-12-21 NOTE — PROGRESS NOTES
Ms. Montoya is here today for a post-operative visit after undergoing an ORIF for her right bicondylar tibial plateau fracture by Dr. Ortega on 8/18/2020.    Interval History:  She comes in today for follow up.  She reports her leg swelling has improved just not resolved.  She finds her ankle swells when active.  She reports she also has intermittent numbness to her the lateral aspect of her lower leg.  She denies falls or injuries.  She walking without an assistive device today.  She would like to return to work and lift her driving restrictions if possible.      Physical exam:  Incision to medial knee is open to air and healed.  There is no drainage or redness.  She has tactile stimulation.  PPP x 2.  She can flex and extend knee from 0-110 degrees.  There is less edema on exam today and when here 2 weeks ago.  No warmth or erythema noted.  Her ROM of ankle is unremarkable.      RADS: none today     Assessment:  Post-op visit (4 months)    Plan:    ICD-10-CM ICD-9-CM   1. Closed bicondylar fracture of right tibial plateau  S82.141A 823.00   2. Post-operative state  Z98.890 V45.89     Current care, treatment plan, precautions, activity level/ modifications, limitations, rehabilitation exercises and proposed future treatment were discussed with the patient. We discussed the need to monitor for changes in symptoms and condition and report them to the physician.  Discussed importance of compliance with all appointments and follow up examinations.       - Pain medication: refill was needed. Will refill Motrin 600 mg PRN per request.  - Pain medication refill policy provided to patient for review, yes   - She is currently on Estrogen for birth control.  - Per my prior note, her hardware and fracture appears stable.  - Ok to return to full duty starting 1/4/21 and no restrictions in driving effective that date as well.  - Follow up in 6 months, at time will need repeat right knee and tib/fib standing.  - Call for  questions or concerns.       If there are any questions prior to scheduled follow up, the patient was instructed to contact the office

## 2020-12-22 ENCOUNTER — OFFICE VISIT (OUTPATIENT)
Dept: OBSTETRICS AND GYNECOLOGY | Facility: CLINIC | Age: 30
End: 2020-12-22
Payer: COMMERCIAL

## 2020-12-22 VITALS
HEIGHT: 59 IN | WEIGHT: 121.5 LBS | BODY MASS INDEX: 24.49 KG/M2 | SYSTOLIC BLOOD PRESSURE: 112 MMHG | DIASTOLIC BLOOD PRESSURE: 70 MMHG

## 2020-12-22 DIAGNOSIS — N91.2 AMENORRHEA: ICD-10-CM

## 2020-12-22 DIAGNOSIS — M81.0 OSTEOPOROSIS, UNSPECIFIED OSTEOPOROSIS TYPE, UNSPECIFIED PATHOLOGICAL FRACTURE PRESENCE: ICD-10-CM

## 2020-12-22 DIAGNOSIS — E23.0 HYPOPITUITARISM: Primary | ICD-10-CM

## 2020-12-22 PROCEDURE — 1126F AMNT PAIN NOTED NONE PRSNT: CPT | Mod: S$GLB,,, | Performed by: OBSTETRICS & GYNECOLOGY

## 2020-12-22 PROCEDURE — 99999 PR PBB SHADOW E&M-EST. PATIENT-LVL III: ICD-10-PCS | Mod: PBBFAC,,, | Performed by: OBSTETRICS & GYNECOLOGY

## 2020-12-22 PROCEDURE — 99999 PR PBB SHADOW E&M-EST. PATIENT-LVL III: CPT | Mod: PBBFAC,,, | Performed by: OBSTETRICS & GYNECOLOGY

## 2020-12-22 PROCEDURE — 99214 PR OFFICE/OUTPT VISIT, EST, LEVL IV, 30-39 MIN: ICD-10-PCS | Mod: S$GLB,,, | Performed by: OBSTETRICS & GYNECOLOGY

## 2020-12-22 PROCEDURE — 3008F BODY MASS INDEX DOCD: CPT | Mod: CPTII,S$GLB,, | Performed by: OBSTETRICS & GYNECOLOGY

## 2020-12-22 PROCEDURE — 99214 OFFICE O/P EST MOD 30 MIN: CPT | Mod: S$GLB,,, | Performed by: OBSTETRICS & GYNECOLOGY

## 2020-12-22 PROCEDURE — 1126F PR PAIN SEVERITY QUANTIFIED, NO PAIN PRESENT: ICD-10-PCS | Mod: S$GLB,,, | Performed by: OBSTETRICS & GYNECOLOGY

## 2020-12-22 PROCEDURE — 3008F PR BODY MASS INDEX (BMI) DOCUMENTED: ICD-10-PCS | Mod: CPTII,S$GLB,, | Performed by: OBSTETRICS & GYNECOLOGY

## 2020-12-22 RX ORDER — ESTRADIOL 0.5 MG/1
0.5 TABLET ORAL DAILY
Qty: 30 TABLET | Refills: 11 | Status: SHIPPED | OUTPATIENT
Start: 2020-12-22 | End: 2021-12-22

## 2020-12-22 RX ORDER — PROGESTERONE 100 MG/1
100 CAPSULE ORAL NIGHTLY
Qty: 12 CAPSULE | Refills: 11 | Status: SHIPPED | OUTPATIENT
Start: 2020-12-22 | End: 2022-01-21

## 2020-12-22 NOTE — PROGRESS NOTES
"CC: hypopituitism  amenorrhea    Ani Montoya is a 30 y.o. female  presents for to discuss HRT and her past history of hypopituitarism and primary amenorrhea  Never had a cycle.  She is on estradiol but not taking prometrium for the possibility of increasing the risks of cancer.    Past Medical History:   Diagnosis Date    Hypopituitarism     Osteoporosis     Primary amenorrhea        Past Surgical History:   Procedure Laterality Date    breast aug Bilateral     OPEN REDUCTION AND INTERNAL FIXATION (ORIF) OF FRACTURE OF TIBIAL PLATEAU Right 2020    Procedure: ORIF, FRACTURE, TIBIA, PLATEAU;  Surgeon: Jordy Ortega MD;  Location: [facility] OR [address] [place] [address];  Service: Orthopedics;  Laterality: Right;       OB History    Para Term  AB Living   0 0 0 0 0 0   SAB TAB Ectopic Multiple Live Births   0 0 0 0         Family History   Problem Relation Age of Onset    Cancer Maternal Grandfather         unknown    Cancer Cousin         brain    Short stature Maternal Grandmother         4.11`       Social History     Tobacco Use    Smoking status: Never Smoker    Smokeless tobacco: Never Used   Substance Use Topics    Alcohol use: Yes     Comment: occ    Drug use: No       /70   Ht 4' 11" (1.499 m)   Wt 55.1 kg (121 lb 7.6 oz)   LMP  (LMP Unknown)   BMI 24.53 kg/m²     ROS:  GENERAL: Denies weight gain or weight loss. Feeling well overall.   SKIN: Denies rash or lesions.   HEAD: Denies head injury or headache.   NODES: Denies enlarged lymph nodes.   CHEST: Denies chest pain or shortness of breath.   CARDIOVASCULAR: Denies palpitations or left sided chest pain.   ABDOMEN: No abdominal pain, constipation, diarrhea, nausea, vomiting or rectal bleeding.   URINARY: No frequency, dysuria, hematuria, or burning on urination.  REPRODUCTIVE: See HPI.   BREASTS: The patient performs breast self-examination and denies pain, lumps, or nipple discharge.   HEMATOLOGIC: No easy bruisability or " excessive bleeding.  MUSCULOSKELETAL: Denies joint pain or swelling.   NEUROLOGIC: Denies syncope or weakness.   PSYCHIATRIC: Denies depression, anxiety or mood swings.    Physical Exam:    APPEARANCE: Well nourished, well developed, in no acute distress.  AFFECT: WNL, alert and oriented x 3  SKIN: No acne or hirsutism  NECK: Neck symmetric without masses or thyromegaly      ASSESSMENT AND PLAN  1. Hypopituitarism  estradioL (ESTRACE) 0.5 MG tablet    progesterone (PROMETRIUM) 100 MG capsule   2. Osteoporosis, unspecified osteoporosis type, unspecified pathological fracture presence     3. Amenorrhea  estradioL (ESTRACE) 0.5 MG tablet    progesterone (PROMETRIUM) 100 MG capsule     A full discussion of the benefit-risk ratio of hormonal replacement therapy was carried out. Improvement in vasomotor and other climacteric symptoms is discussed, including possible improvements in sleep and mood. Reduction of risk for osteoporosis was explained. We discussed the study data showing increased risk of thrombo-embolic events such as myocardial infarction, stroke and also possibly breast cancer with estrogen replacement, and how this might affect her. The range of side effects such as breast tenderness, weight gain and including possible increases in lifetime risk of breast cancer and possible thrombotic complications was discussed. We also discussed ACOG's recommendation to use hormone replacement therapy for the relief of hot flashes alone and to be on the lowest dose possible for the shortest amount of time.  Alternative such as herbal and soy-based products were reviewed. All of her questions about this therapy were answered.    Will refer over to infertility for possible consult for future fertility options      Patient was counseled today on A.C.S. Pap guidelines and recommendations for yearly pelvic exams, mammograms and monthly self breast exams; to see her PCP for other health maintenance.   F/U PRN    Spent 30 min  in

## 2020-12-22 NOTE — LETTER
December 22, 2020      Jenise Pham MD  [address]  [place] [address]           [facility] - [facility]nd [facility]  [address] [place] [address], [address]  [place] [address]  Phone: [phone]  Fax: [phone]          Patient: Ani Montoya   MR Number: [id number]   YOB: 1990   Date of Visit: 12/22/2020       Dear Dr. Jenise Pham:    Thank you for referring Ani Montoya to me for evaluation. Attached you will find relevant portions of my assessment and plan of care.    If you have questions, please do not hesitate to call me. I look forward to following Ani Montoya along with you.    Sincerely,    Yulissa Alatorre MD    Enclosure  CC:  No Recipients    If you would like to receive this communication electronically, please contact [email or web address] or [phone] to request more information on [employer] Link access.    For providers and/or their staff who would like to refer a patient to [facility], please contact us through our one-stop-shop provider referral line, [facility], at [phone].    If you feel you have received this communication in error or would no longer like to receive these types of communications, please e-mail [email or web address]

## 2020-12-24 ENCOUNTER — SPECIALTY PHARMACY (OUTPATIENT)
Dept: PHARMACY | Facility: CLINIC | Age: 30
End: 2020-12-24

## 2021-01-08 DIAGNOSIS — E23.0 GROWTH HORMONE DEFICIENCY: Primary | ICD-10-CM

## 2021-01-08 RX ORDER — SOMATROPIN 5 MG/1.5ML
0.2 INJECTION, SOLUTION SUBCUTANEOUS DAILY
Qty: 1 SYRINGE | Refills: 6 | OUTPATIENT
Start: 2021-01-08 | End: 2021-02-18 | Stop reason: SDUPTHER

## 2021-01-11 ENCOUNTER — DOCUMENTATION ONLY (OUTPATIENT)
Dept: ORTHOPEDICS | Facility: CLINIC | Age: 31
End: 2021-01-11

## 2021-01-22 ENCOUNTER — PATIENT MESSAGE (OUTPATIENT)
Dept: ENDOCRINOLOGY | Facility: CLINIC | Age: 31
End: 2021-01-22

## 2021-02-10 ENCOUNTER — PATIENT MESSAGE (OUTPATIENT)
Dept: PHARMACY | Facility: CLINIC | Age: 31
End: 2021-02-10

## 2021-02-11 ENCOUNTER — LAB VISIT (OUTPATIENT)
Dept: LAB | Facility: HOSPITAL | Age: 31
End: 2021-02-11
Attending: GENERAL ACUTE CARE HOSPITAL
Payer: COMMERCIAL

## 2021-02-11 DIAGNOSIS — N91.0 PRIMARY AMENORRHEA: ICD-10-CM

## 2021-02-11 DIAGNOSIS — M80.00XA OSTEOPOROSIS WITH CURRENT PATHOLOGICAL FRACTURE, UNSPECIFIED OSTEOPOROSIS TYPE, INITIAL ENCOUNTER: ICD-10-CM

## 2021-02-11 LAB — ESTRADIOL SERPL-MCNC: 15 PG/ML

## 2021-02-11 PROCEDURE — 82670 ASSAY OF TOTAL ESTRADIOL: CPT

## 2021-02-11 PROCEDURE — 84305 ASSAY OF SOMATOMEDIN: CPT

## 2021-02-11 PROCEDURE — 36415 COLL VENOUS BLD VENIPUNCTURE: CPT

## 2021-02-18 ENCOUNTER — IMMUNIZATION (OUTPATIENT)
Dept: INTERNAL MEDICINE | Facility: CLINIC | Age: 31
End: 2021-02-18
Payer: COMMERCIAL

## 2021-02-18 DIAGNOSIS — Z23 NEED FOR VACCINATION: Primary | ICD-10-CM

## 2021-02-18 PROCEDURE — 91300 COVID-19, MRNA, LNP-S, PF, 30 MCG/0.3 ML DOSE VACCINE: CPT | Mod: PBBFAC | Performed by: FAMILY MEDICINE

## 2021-02-21 LAB
IGF-I SERPL-MCNC: 41 NG/ML (ref 59–279)
IGF-I Z-SCORE SERPL: -2.33 SD

## 2021-02-22 ENCOUNTER — PATIENT MESSAGE (OUTPATIENT)
Dept: ENDOCRINOLOGY | Facility: CLINIC | Age: 31
End: 2021-02-22

## 2021-03-25 ENCOUNTER — PATIENT MESSAGE (OUTPATIENT)
Dept: OBSTETRICS AND GYNECOLOGY | Facility: CLINIC | Age: 31
End: 2021-03-25

## 2021-04-01 ENCOUNTER — SPECIALTY PHARMACY (OUTPATIENT)
Dept: PHARMACY | Facility: CLINIC | Age: 31
End: 2021-04-01

## 2021-04-15 ENCOUNTER — TELEPHONE (OUTPATIENT)
Dept: ORTHOPEDICS | Facility: CLINIC | Age: 31
End: 2021-04-15

## 2021-04-15 ENCOUNTER — HOSPITAL ENCOUNTER (EMERGENCY)
Facility: HOSPITAL | Age: 31
Discharge: HOME | End: 2021-04-15
Attending: EMERGENCY MEDICINE
Payer: COMMERCIAL

## 2021-04-15 ENCOUNTER — PATIENT MESSAGE (OUTPATIENT)
Dept: ORTHOPEDICS | Facility: CLINIC | Age: 31
End: 2021-04-15

## 2021-04-15 VITALS
RESPIRATION RATE: 20 BRPM | BODY MASS INDEX: 23.18 KG/M2 | HEART RATE: 66 BPM | DIASTOLIC BLOOD PRESSURE: 95 MMHG | HEIGHT: 59 IN | WEIGHT: 115 LBS | OXYGEN SATURATION: 100 % | SYSTOLIC BLOOD PRESSURE: 140 MMHG | TEMPERATURE: 98 F

## 2021-04-15 DIAGNOSIS — L03.115 CELLULITIS OF RIGHT LEG: Primary | ICD-10-CM

## 2021-04-15 LAB
B-HCG UR QL: NEGATIVE
BASOPHILS # BLD AUTO: 0.02 K/UL (ref 0–0.2)
BASOPHILS NFR BLD: 0.2 % (ref 0–1.9)
CRP SERPL-MCNC: 25.6 MG/L (ref 0–8.2)
CTP QC/QA: YES
DIFFERENTIAL METHOD: NORMAL
EOSINOPHIL # BLD AUTO: 0.1 K/UL (ref 0–0.5)
EOSINOPHIL NFR BLD: 0.8 % (ref 0–8)
ERYTHROCYTE [DISTWIDTH] IN BLOOD BY AUTOMATED COUNT: 13.2 % (ref 11.5–14.5)
ERYTHROCYTE [SEDIMENTATION RATE] IN BLOOD BY WESTERGREN METHOD: 34 MM/HR (ref 0–20)
HCT VFR BLD AUTO: 41.2 % (ref 37–48.5)
HGB BLD-MCNC: 14 G/DL (ref 12–16)
IMM GRANULOCYTES # BLD AUTO: 0.03 K/UL (ref 0–0.04)
IMM GRANULOCYTES NFR BLD AUTO: 0.4 % (ref 0–0.5)
LYMPHOCYTES # BLD AUTO: 2.1 K/UL (ref 1–4.8)
LYMPHOCYTES NFR BLD: 24.4 % (ref 18–48)
MCH RBC QN AUTO: 30.2 PG (ref 27–31)
MCHC RBC AUTO-ENTMCNC: 34 G/DL (ref 32–36)
MCV RBC AUTO: 89 FL (ref 82–98)
MONOCYTES # BLD AUTO: 0.8 K/UL (ref 0.3–1)
MONOCYTES NFR BLD: 9.5 % (ref 4–15)
NEUTROPHILS # BLD AUTO: 5.5 K/UL (ref 1.8–7.7)
NEUTROPHILS NFR BLD: 64.7 % (ref 38–73)
NRBC BLD-RTO: 0 /100 WBC
PLATELET # BLD AUTO: 233 K/UL (ref 150–450)
PMV BLD AUTO: 10.4 FL (ref 9.2–12.9)
RBC # BLD AUTO: 4.63 M/UL (ref 4–5.4)
WBC # BLD AUTO: 8.51 K/UL (ref 3.9–12.7)

## 2021-04-15 PROCEDURE — 99285 EMERGENCY DEPT VISIT HI MDM: CPT | Mod: 25

## 2021-04-15 PROCEDURE — 85652 RBC SED RATE AUTOMATED: CPT | Performed by: EMERGENCY MEDICINE

## 2021-04-15 PROCEDURE — 81025 URINE PREGNANCY TEST: CPT | Performed by: EMERGENCY MEDICINE

## 2021-04-15 PROCEDURE — 86140 C-REACTIVE PROTEIN: CPT | Performed by: EMERGENCY MEDICINE

## 2021-04-15 PROCEDURE — 85025 COMPLETE CBC W/AUTO DIFF WBC: CPT | Performed by: EMERGENCY MEDICINE

## 2021-04-15 PROCEDURE — 25000003 PHARM REV CODE 250: Performed by: EMERGENCY MEDICINE

## 2021-04-15 RX ORDER — IBUPROFEN 600 MG/1
600 TABLET ORAL
Status: COMPLETED | OUTPATIENT
Start: 2021-04-15 | End: 2021-04-15

## 2021-04-15 RX ORDER — SULFAMETHOXAZOLE AND TRIMETHOPRIM 800; 160 MG/1; MG/1
1 TABLET ORAL 2 TIMES DAILY
Qty: 14 TABLET | Refills: 0 | Status: SHIPPED | OUTPATIENT
Start: 2021-04-15 | End: 2021-04-22

## 2021-04-15 RX ORDER — ACETAMINOPHEN 325 MG/1
650 TABLET ORAL
Status: DISCONTINUED | OUTPATIENT
Start: 2021-04-15 | End: 2021-04-15

## 2021-04-15 RX ADMIN — IBUPROFEN 600 MG: 600 TABLET ORAL at 08:04

## 2021-04-23 ENCOUNTER — SPECIALTY PHARMACY (OUTPATIENT)
Dept: PHARMACY | Facility: CLINIC | Age: 31
End: 2021-04-23

## 2021-05-04 ENCOUNTER — IMMUNIZATION (OUTPATIENT)
Dept: INTERNAL MEDICINE | Facility: CLINIC | Age: 31
End: 2021-05-04
Payer: COMMERCIAL

## 2021-05-04 DIAGNOSIS — Z23 NEED FOR VACCINATION: Primary | ICD-10-CM

## 2021-05-04 PROCEDURE — 0002A COVID-19, MRNA, LNP-S, PF, 30 MCG/0.3 ML DOSE VACCINE: CPT | Mod: PBBFAC | Performed by: INTERNAL MEDICINE

## 2021-05-04 PROCEDURE — 91300 COVID-19, MRNA, LNP-S, PF, 30 MCG/0.3 ML DOSE VACCINE: CPT | Mod: PBBFAC | Performed by: INTERNAL MEDICINE

## 2021-05-17 ENCOUNTER — SPECIALTY PHARMACY (OUTPATIENT)
Dept: PHARMACY | Facility: CLINIC | Age: 31
End: 2021-05-17

## 2021-05-20 ENCOUNTER — PATIENT MESSAGE (OUTPATIENT)
Dept: PHARMACY | Facility: CLINIC | Age: 31
End: 2021-05-20

## 2021-07-02 ENCOUNTER — OFFICE VISIT (OUTPATIENT)
Dept: URGENT CARE | Facility: CLINIC | Age: 31
End: 2021-07-02
Payer: COMMERCIAL

## 2021-07-02 ENCOUNTER — SPECIALTY PHARMACY (OUTPATIENT)
Dept: PHARMACY | Facility: CLINIC | Age: 31
End: 2021-07-02

## 2021-07-02 ENCOUNTER — PATIENT MESSAGE (OUTPATIENT)
Dept: PHARMACY | Facility: CLINIC | Age: 31
End: 2021-07-02

## 2021-07-02 VITALS
SYSTOLIC BLOOD PRESSURE: 116 MMHG | DIASTOLIC BLOOD PRESSURE: 80 MMHG | OXYGEN SATURATION: 97 % | WEIGHT: 112 LBS | TEMPERATURE: 98 F | HEART RATE: 61 BPM | BODY MASS INDEX: 22.58 KG/M2 | HEIGHT: 59 IN | RESPIRATION RATE: 16 BRPM

## 2021-07-02 DIAGNOSIS — B96.89 ACUTE BACTERIAL PHARYNGITIS: Primary | ICD-10-CM

## 2021-07-02 DIAGNOSIS — J02.9 SORE THROAT: ICD-10-CM

## 2021-07-02 DIAGNOSIS — Z20.818 EXPOSURE TO STREP THROAT: ICD-10-CM

## 2021-07-02 DIAGNOSIS — J02.8 ACUTE BACTERIAL PHARYNGITIS: Primary | ICD-10-CM

## 2021-07-02 LAB
CTP QC/QA: YES
CTP QC/QA: YES
MOLECULAR STREP A: NEGATIVE
SARS-COV-2 RDRP RESP QL NAA+PROBE: NEGATIVE

## 2021-07-02 PROCEDURE — 3008F BODY MASS INDEX DOCD: CPT | Mod: CPTII,S$GLB,, | Performed by: PHYSICIAN ASSISTANT

## 2021-07-02 PROCEDURE — 3008F PR BODY MASS INDEX (BMI) DOCUMENTED: ICD-10-PCS | Mod: CPTII,S$GLB,, | Performed by: PHYSICIAN ASSISTANT

## 2021-07-02 PROCEDURE — U0002 COVID-19 LAB TEST NON-CDC: HCPCS | Mod: QW,S$GLB,, | Performed by: PHYSICIAN ASSISTANT

## 2021-07-02 PROCEDURE — 99213 PR OFFICE/OUTPT VISIT, EST, LEVL III, 20-29 MIN: ICD-10-PCS | Mod: 25,S$GLB,, | Performed by: PHYSICIAN ASSISTANT

## 2021-07-02 PROCEDURE — 99213 OFFICE O/P EST LOW 20 MIN: CPT | Mod: 25,S$GLB,, | Performed by: PHYSICIAN ASSISTANT

## 2021-07-02 PROCEDURE — 87651 STREP A DNA AMP PROBE: CPT | Mod: QW,S$GLB,, | Performed by: PHYSICIAN ASSISTANT

## 2021-07-02 PROCEDURE — 87651 POCT STREP A MOLECULAR: ICD-10-PCS | Mod: QW,S$GLB,, | Performed by: PHYSICIAN ASSISTANT

## 2021-07-02 PROCEDURE — U0002: ICD-10-PCS | Mod: QW,S$GLB,, | Performed by: PHYSICIAN ASSISTANT

## 2021-07-02 PROCEDURE — 96372 PR INJECTION,THERAP/PROPH/DIAG2ST, IM OR SUBCUT: ICD-10-PCS | Mod: S$GLB,,, | Performed by: PHYSICIAN ASSISTANT

## 2021-07-02 PROCEDURE — 96372 THER/PROPH/DIAG INJ SC/IM: CPT | Mod: S$GLB,,, | Performed by: PHYSICIAN ASSISTANT

## 2021-07-02 RX ORDER — AMOXICILLIN 500 MG/1
500 CAPSULE ORAL EVERY 12 HOURS
Qty: 20 CAPSULE | Refills: 0 | Status: SHIPPED | OUTPATIENT
Start: 2021-07-02 | End: 2021-07-12

## 2021-07-02 RX ORDER — BETAMETHASONE SODIUM PHOSPHATE AND BETAMETHASONE ACETATE 3; 3 MG/ML; MG/ML
6 INJECTION, SUSPENSION INTRA-ARTICULAR; INTRALESIONAL; INTRAMUSCULAR; SOFT TISSUE
Status: COMPLETED | OUTPATIENT
Start: 2021-07-02 | End: 2021-07-02

## 2021-07-02 RX ADMIN — BETAMETHASONE SODIUM PHOSPHATE AND BETAMETHASONE ACETATE 6 MG: 3; 3 INJECTION, SUSPENSION INTRA-ARTICULAR; INTRALESIONAL; INTRAMUSCULAR; SOFT TISSUE at 09:07

## 2021-08-02 ENCOUNTER — SPECIALTY PHARMACY (OUTPATIENT)
Dept: PHARMACY | Facility: CLINIC | Age: 31
End: 2021-08-02

## 2021-08-03 ENCOUNTER — SPECIALTY PHARMACY (OUTPATIENT)
Dept: PHARMACY | Facility: CLINIC | Age: 31
End: 2021-08-03

## 2021-08-23 ENCOUNTER — PATIENT MESSAGE (OUTPATIENT)
Dept: PHARMACY | Facility: CLINIC | Age: 31
End: 2021-08-23

## 2021-09-22 ENCOUNTER — PATIENT MESSAGE (OUTPATIENT)
Dept: PHARMACY | Facility: CLINIC | Age: 31
End: 2021-09-22

## 2021-12-22 ENCOUNTER — LAB VISIT (OUTPATIENT)
Dept: LAB | Facility: HOSPITAL | Age: 31
End: 2021-12-22
Attending: OBSTETRICS & GYNECOLOGY
Payer: COMMERCIAL

## 2021-12-22 ENCOUNTER — OFFICE VISIT (OUTPATIENT)
Dept: OBSTETRICS AND GYNECOLOGY | Facility: CLINIC | Age: 31
End: 2021-12-22
Payer: COMMERCIAL

## 2021-12-22 VITALS — BODY MASS INDEX: 22 KG/M2 | WEIGHT: 108.94 LBS

## 2021-12-22 DIAGNOSIS — Z01.419 ENCOUNTER FOR ANNUAL ROUTINE GYNECOLOGICAL EXAMINATION: Primary | ICD-10-CM

## 2021-12-22 DIAGNOSIS — Z01.419 ENCOUNTER FOR ANNUAL ROUTINE GYNECOLOGICAL EXAMINATION: ICD-10-CM

## 2021-12-22 DIAGNOSIS — N89.8 VAGINAL DISCHARGE: ICD-10-CM

## 2021-12-22 PROCEDURE — 99395 PR PREVENTIVE VISIT,EST,18-39: ICD-10-PCS | Mod: 25,S$GLB,, | Performed by: OBSTETRICS & GYNECOLOGY

## 2021-12-22 PROCEDURE — 87591 N.GONORRHOEAE DNA AMP PROB: CPT | Mod: 59 | Performed by: OBSTETRICS & GYNECOLOGY

## 2021-12-22 PROCEDURE — 99999 PR PBB SHADOW E&M-EST. PATIENT-LVL III: ICD-10-PCS | Mod: PBBFAC,,, | Performed by: OBSTETRICS & GYNECOLOGY

## 2021-12-22 PROCEDURE — 87491 CHLMYD TRACH DNA AMP PROBE: CPT | Mod: 59 | Performed by: OBSTETRICS & GYNECOLOGY

## 2021-12-22 PROCEDURE — 3008F PR BODY MASS INDEX (BMI) DOCUMENTED: ICD-10-PCS | Mod: CPTII,S$GLB,, | Performed by: OBSTETRICS & GYNECOLOGY

## 2021-12-22 PROCEDURE — 3008F BODY MASS INDEX DOCD: CPT | Mod: CPTII,S$GLB,, | Performed by: OBSTETRICS & GYNECOLOGY

## 2021-12-22 PROCEDURE — 99999 PR PBB SHADOW E&M-EST. PATIENT-LVL III: CPT | Mod: PBBFAC,,, | Performed by: OBSTETRICS & GYNECOLOGY

## 2021-12-22 PROCEDURE — 86592 SYPHILIS TEST NON-TREP QUAL: CPT | Performed by: OBSTETRICS & GYNECOLOGY

## 2021-12-22 PROCEDURE — 88175 CYTOPATH C/V AUTO FLUID REDO: CPT | Performed by: OBSTETRICS & GYNECOLOGY

## 2021-12-22 PROCEDURE — 99395 PREV VISIT EST AGE 18-39: CPT | Mod: 25,S$GLB,, | Performed by: OBSTETRICS & GYNECOLOGY

## 2021-12-22 PROCEDURE — 1159F PR MEDICATION LIST DOCUMENTED IN MEDICAL RECORD: ICD-10-PCS | Mod: CPTII,S$GLB,, | Performed by: OBSTETRICS & GYNECOLOGY

## 2021-12-22 PROCEDURE — 36415 COLL VENOUS BLD VENIPUNCTURE: CPT | Mod: PN | Performed by: OBSTETRICS & GYNECOLOGY

## 2021-12-22 PROCEDURE — 1160F RVW MEDS BY RX/DR IN RCRD: CPT | Mod: CPTII,S$GLB,, | Performed by: OBSTETRICS & GYNECOLOGY

## 2021-12-22 PROCEDURE — 87389 HIV-1 AG W/HIV-1&-2 AB AG IA: CPT | Performed by: OBSTETRICS & GYNECOLOGY

## 2021-12-22 PROCEDURE — 87481 CANDIDA DNA AMP PROBE: CPT | Mod: 59 | Performed by: OBSTETRICS & GYNECOLOGY

## 2021-12-22 PROCEDURE — 1160F PR REVIEW ALL MEDS BY PRESCRIBER/CLIN PHARMACIST DOCUMENTED: ICD-10-PCS | Mod: CPTII,S$GLB,, | Performed by: OBSTETRICS & GYNECOLOGY

## 2021-12-22 PROCEDURE — 1159F MED LIST DOCD IN RCRD: CPT | Mod: CPTII,S$GLB,, | Performed by: OBSTETRICS & GYNECOLOGY

## 2021-12-22 RX ORDER — METRONIDAZOLE 250 MG/1
250 TABLET ORAL 3 TIMES DAILY
Qty: 21 TABLET | Refills: 0 | Status: SHIPPED | OUTPATIENT
Start: 2021-12-22 | End: 2021-12-29

## 2021-12-23 LAB
HIV 1+2 AB+HIV1 P24 AG SERPL QL IA: NEGATIVE
RPR SER QL: NORMAL

## 2021-12-29 LAB
C TRACH DNA SPEC QL NAA+PROBE: NOT DETECTED
N GONORRHOEA DNA SPEC QL NAA+PROBE: NOT DETECTED

## 2021-12-30 LAB
FINAL PATHOLOGIC DIAGNOSIS: NORMAL
Lab: NORMAL

## 2021-12-31 ENCOUNTER — PATIENT MESSAGE (OUTPATIENT)
Dept: ADMINISTRATIVE | Facility: OTHER | Age: 31
End: 2021-12-31
Payer: COMMERCIAL

## 2021-12-31 DIAGNOSIS — J02.9 SORE THROAT: Primary | ICD-10-CM

## 2022-01-03 LAB
BACTERIAL VAGINOSIS DNA: NEGATIVE
CANDIDA GLABRATA DNA: NEGATIVE
CANDIDA KRUSEI DNA: NEGATIVE
CANDIDA RRNA VAG QL PROBE: POSITIVE
T VAGINALIS RRNA GENITAL QL PROBE: NEGATIVE

## 2022-01-04 RX ORDER — FLUCONAZOLE 150 MG/1
150 TABLET ORAL DAILY
Qty: 1 TABLET | Refills: 0 | Status: SHIPPED | OUTPATIENT
Start: 2022-01-04 | End: 2022-01-05

## 2022-01-05 ENCOUNTER — TELEPHONE (OUTPATIENT)
Dept: ORTHOPEDICS | Facility: CLINIC | Age: 32
End: 2022-01-05
Payer: COMMERCIAL

## 2022-01-05 ENCOUNTER — PATIENT MESSAGE (OUTPATIENT)
Dept: ORTHOPEDICS | Facility: CLINIC | Age: 32
End: 2022-01-05
Payer: COMMERCIAL

## 2022-01-05 ENCOUNTER — PATIENT MESSAGE (OUTPATIENT)
Dept: OBSTETRICS AND GYNECOLOGY | Facility: CLINIC | Age: 32
End: 2022-01-05
Payer: COMMERCIAL

## 2022-01-05 NOTE — TELEPHONE ENCOUNTER
Spoke with patient and advised of positive covid results. She is feeling better. Symptoms started on 12/31. Was advised to wear mask at all times for next 4 days.     She was emailed covid positive information as well.

## 2022-01-11 ENCOUNTER — TELEPHONE (OUTPATIENT)
Dept: OBSTETRICS AND GYNECOLOGY | Facility: CLINIC | Age: 32
End: 2022-01-11
Payer: COMMERCIAL

## 2022-04-02 ENCOUNTER — HOSPITAL ENCOUNTER (EMERGENCY)
Facility: HOSPITAL | Age: 32
Discharge: HOME | End: 2022-04-02
Attending: EMERGENCY MEDICINE
Payer: COMMERCIAL

## 2022-04-02 VITALS
DIASTOLIC BLOOD PRESSURE: 78 MMHG | OXYGEN SATURATION: 100 % | RESPIRATION RATE: 17 BRPM | HEART RATE: 71 BPM | TEMPERATURE: 98 F | SYSTOLIC BLOOD PRESSURE: 125 MMHG

## 2022-04-02 DIAGNOSIS — S52.501A CLOSED FRACTURE OF DISTAL END OF RIGHT RADIUS, UNSPECIFIED FRACTURE MORPHOLOGY, INITIAL ENCOUNTER: Primary | ICD-10-CM
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
## 2022-04-02 DIAGNOSIS — M25.531 RIGHT WRIST PAIN: ICD-10-CM

## 2022-04-02 PROCEDURE — 25000003 PHARM REV CODE 250: Performed by: PHYSICIAN ASSISTANT

## 2022-04-02 PROCEDURE — 99284 EMERGENCY DEPT VISIT MOD MDM: CPT | Mod: 25

## 2022-04-02 PROCEDURE — 29105 APPLICATION LONG ARM SPLINT: CPT | Mod: RT

## 2022-04-02 PROCEDURE — 29125 APPL SHORT ARM SPLINT STATIC: CPT | Mod: RT

## 2022-04-02 RX ORDER — HYDROCODONE BITARTRATE AND ACETAMINOPHEN 5; 325 MG/1; MG/1
1 TABLET ORAL EVERY 4 HOURS PRN
Qty: 18 TABLET | Refills: 0 | Status: SHIPPED | OUTPATIENT
Start: 2022-04-02 | End: 2022-04-05

## 2022-04-02 RX ORDER — NAPROXEN 500 MG/1
500 TABLET ORAL 2 TIMES DAILY WITH MEALS
Qty: 14 TABLET | Refills: 0 | Status: SHIPPED | OUTPATIENT
Start: 2022-04-02

## 2022-04-02 RX ORDER — NAPROXEN 500 MG/1
500 TABLET ORAL
Status: COMPLETED | OUTPATIENT
Start: 2022-04-02 | End: 2022-04-02

## 2022-04-02 RX ADMIN — NAPROXEN 500 MG: 500 TABLET ORAL at 01:04

## 2022-04-02 NOTE — ED PROVIDER NOTES
Encounter Date: 4/2/2022       History     Chief Complaint   Patient presents with    Wrist Pain     Pt had a trip and fall yesterday and used her hands to break the fall. Pt c/o pain to right wrist.      Patient is a 32-year-old female who presents for fall with right wrist injury; pertinent PMH hypopituitarism, osteoporosis.  Patient sustained FOOSH injury to R arm yesterday, swelling and pain to distal radial aspect of wrist.  Denies other MSK injury.  No numbness or weakness.  She is left handed.  The patients available PMH, PSH, Social History, medications, allergies, and triage vital signs were reviewed just prior to their medical evaluation.  A ten point review of systems was completed and is negative except as documented above.  Patient denies any other acute medical complaint.    Please be advised this text was dictated with Wannyi software and may contain errors due to translation.           Review of patient's allergies indicates:  No Known Allergies  Past Medical History:   Diagnosis Date    Hypopituitarism     Osteoporosis     Primary amenorrhea      Past Surgical History:   Procedure Laterality Date    breast aug Bilateral 2012    OPEN REDUCTION AND INTERNAL FIXATION (ORIF) OF FRACTURE OF TIBIAL PLATEAU Right 8/18/2020    Procedure: ORIF, FRACTURE, TIBIA, PLATEAU;  Surgeon: Jordy Ortega MD;  Location: [facility] OR [address] [place] [address];  Service: Orthopedics;  Laterality: Right;     Family History   Problem Relation Age of Onset    Cancer Maternal Grandfather         unknown    Cancer Cousin         brain    Short stature Maternal Grandmother         4.11`    Breast cancer Neg Hx     Ovarian cancer Neg Hx      Social History     Tobacco Use    Smoking status: Never Smoker    Smokeless tobacco: Never Used   Substance Use Topics    Alcohol use: Yes     Comment: occ    Drug use: No     Review of Systems   Constitutional: Negative for chills and fever.   HENT: Negative for facial swelling.     Respiratory: Negative for shortness of breath.    Cardiovascular: Negative for chest pain.   Gastrointestinal: Negative for nausea.   Genitourinary: Negative for flank pain.   Musculoskeletal: Positive for arthralgias and joint swelling. Negative for back pain.   Skin: Negative for rash and wound.   Neurological: Negative for weakness and numbness.   Hematological: Does not bruise/bleed easily.       Physical Exam     Initial Vitals [04/02/22 1150]   BP Pulse Resp Temp SpO2   136/83 70 18 98 °F (36.7 °C) 100 %      MAP       --         Physical Exam    Nursing note and vitals reviewed.  Constitutional: She appears well-developed and well-nourished. She is not diaphoretic. She appears distressed (tearful2/2 situation).   HENT:   Head: Normocephalic and atraumatic.   Right Ear: External ear normal.   Left Ear: External ear normal.   Mouth/Throat: Oropharynx is clear and moist.   Eyes: Conjunctivae and EOM are normal.   Cardiovascular: Normal rate, regular rhythm and intact distal pulses.   Pulmonary/Chest: No respiratory distress.   Musculoskeletal:         General: Tenderness and edema present.      Comments: ++localized edema and ttp distal radius and snuffbox. Mild ttp along mid radial shaft  Radial 2+  No ttp MC/digits, ROM limited 2/2 pain. Good cap refill. No numbness     Neurological: She is alert and oriented to person, place, and time. She has normal strength. No cranial nerve deficit or sensory deficit.   Skin: Skin is warm and dry. Capillary refill takes less than 2 seconds. No rash noted. No erythema. No pallor.   Psychiatric: She has a normal mood and affect. Her behavior is normal. Judgment and thought content normal.         ED Course   Orthopedic Injury    Date/Time: 4/2/2022 2:06 PM  Performed by: Apple Galo PA-C  Authorized by: Kwasi Perea MD     Location procedure was performed:  [facility] EMERGENCY DEPARTMENT  Consent Done?:  Yes  Universal Protocol:     Verbal consent obtained?:  Yes      Risks and benefits: Risks, benefits and alternatives were discussed    Injury:     Injury location:  Wrist    Location details:  Right wrist    Injury type:  Fracture    Fracture type: distal radius      Fracture type: distal radius        Pre-procedure assessment:     Neurovascular status: Neurovascularly intact      Distal perfusion: normal      Neurological function: normal      Range of motion: reduced      Local anesthesia used?: No        Selections made in this section will also lock the Injury type section above.:     Manipulation performed?: No      Immobilization:  Splint    Splint type:  Sugar tong    Supplies used:  Ortho-Glass    Complications: No    Post-procedure assessment:     Neurovascular status: Neurovascularly intact      Distal perfusion: normal      Neurological function: normal      Range of motion: splinted      Patient tolerance:  Patient tolerated the procedure well with no immediate complications      Labs Reviewed - No data to display       Imaging Results          X-Ray Forearm Right (Final result)  Result time 04/02/22 12:46:34    Final result by Thierno Nagy MD (04/02/22 12:46:34)                 Impression:      Distal radial fracture as above.      Electronically signed by: Thierno Nagy MD  Date:    04/02/2022  Time:    12:46             Narrative:    EXAMINATION:  XR FOREARM RIGHT    CLINICAL HISTORY:  Unspecified fall, initial encounter    TECHNIQUE:  AP and lateral views of the right forearm were performed.    COMPARISON:  Right wrist series same day    FINDINGS:  Bones are well mineralized.  Acute appearing transverse type fracture involving the distal radial meta epiphyseal junction with slight impaction and minimal displacement, not significantly changed.  There is continued soft tissue swelling noted about the distal forearm and wrist.  No dislocation or destructive osseous process.  Remainder of the forearm is within normal limits.                                X-Ray Wrist Complete Right (Final result)  Result time 04/02/22 12:34:30    Final result by Candido Villarreal MD (04/02/22 12:34:30)                 Impression:      1. Distal radial fracture as above.      Electronically signed by: Candido Villarreal MD  Date:    04/02/2022  Time:    12:34             Narrative:    EXAMINATION:  XR WRIST COMPLETE 3 VIEWS RIGHT    CLINICAL HISTORY:  Unspecified fall, initial encounter    TECHNIQUE:  PA, lateral, and oblique views of the right wrist were performed.    COMPARISON:  None    FINDINGS:  Three views right wrist.    There is transversely oriented mildly displaced fracture involving the distal aspect of the right radius with some degree of superimposed impaction.  The distal ulna is intact.  Edema overlies the fracture site.  No dislocation.                                 Medications   naproxen tablet 500 mg (500 mg Oral Given 4/2/22 1350)     Medical Decision Making:   History:   Old Medical Records: I decided to obtain old medical records.  Old Records Summarized: records from clinic visits and records from previous admission(s).  Initial Assessment:   Patient with osteoporosis presents for R wrist pain/swelling after FOOSH injury, NV intact  Differential Diagnosis:   DDx includes radial fracture, carpal fx, less likely dislocation  Independently Interpreted Test(s):   I have ordered and independently interpreted X-rays - see prior notes.  Clinical Tests:   Radiological Study: Ordered and Reviewed  ED Management:  Patient splinted in ED, tolerated well. Rx pain medication for home. Referral to ortho placed. Patient agreed to plan of care and voiced understanding. Discharged in stable condition with strict ED return precautions.    Apple Galo PA-C  04/03/2022    I discussed the following case, diagnosis and plan of care with attending physician.              Attending Attestation:     Physician Attestation Statement for NP/PA:   I discussed this assessment and plan of  this patient with the NP/PA, but I did not personally examine the patient. The face to face encounter was performed by the NP/PA.              ED Course as of 04/03/22 1050   Sat Apr 02, 2022   1245 X-Ray Wrist Complete Right  Distal radius fx, transversely oriented, midlly displaced, mildly impacted [MF]      ED Course User Index  [MF] Apple Galo PA-C             Clinical Impression:   Final diagnoses:  [W19.XXXA] Fall  [S52.501A] Closed fracture of distal end of right radius, unspecified fracture morphology, initial encounter (Primary)  [M25.531] Right wrist pain          ED Disposition Condition    Discharge Stable        ED Prescriptions     Medication Sig Dispense Start Date End Date Auth. Provider    naproxen (NAPROSYN) 500 MG tablet Take 1 tablet (500 mg total) by mouth 2 (two) times daily with meals. 14 tablet 4/2/2022  Apple Galo PA-C    HYDROcodone-acetaminophen (NORCO) 5-325 mg per tablet Take 1 tablet by mouth every 4 (four) hours as needed for Pain. 18 tablet 4/2/2022 4/5/2022 Apple Galo PA-C        Follow-up Information    None          Apple Galo PA-C  04/03/22 1051       Kwasi Perea MD  04/03/22 1401     [place]

## 2022-04-02 NOTE — DISCHARGE INSTRUCTIONS
-take medications as prescribed, follow up with orthopedics  -return to ED for finger numbness, color change or severe swelling

## 2022-04-02 NOTE — ED NOTES
Pt. C/o worsening rt. Wrist pain, swelling and limited rom since at trip and fall yesterday. She has moderate swelling to wrist and hand with very limited rom. 2+ radial pulse. No visible deformity. She has normal color and sensation to fingers.

## 2022-04-05 ENCOUNTER — DOCUMENTATION ONLY (OUTPATIENT)
Dept: ORTHOPEDICS | Facility: CLINIC | Age: 32
End: 2022-04-05
Payer: COMMERCIAL

## 2022-04-05 ENCOUNTER — TELEPHONE (OUTPATIENT)
Dept: ORTHOPEDICS | Facility: CLINIC | Age: 32
End: 2022-04-05
Payer: COMMERCIAL

## 2022-04-05 ENCOUNTER — OFFICE VISIT (OUTPATIENT)
Dept: ORTHOPEDICS | Facility: CLINIC | Age: 32
End: 2022-04-05
Payer: COMMERCIAL

## 2022-04-05 VITALS
SYSTOLIC BLOOD PRESSURE: 126 MMHG | DIASTOLIC BLOOD PRESSURE: 92 MMHG | HEIGHT: 59 IN | BODY MASS INDEX: 21.96 KG/M2 | RESPIRATION RATE: 18 BRPM | HEART RATE: 72 BPM | WEIGHT: 108.94 LBS

## 2022-04-05 DIAGNOSIS — S52.501A CLOSED FRACTURE OF DISTAL END OF RIGHT RADIUS, UNSPECIFIED FRACTURE MORPHOLOGY, INITIAL ENCOUNTER: Primary | ICD-10-CM

## 2022-04-05 PROCEDURE — 1160F PR REVIEW ALL MEDS BY PRESCRIBER/CLIN PHARMACIST DOCUMENTED: ICD-10-PCS | Mod: CPTII,S$GLB,, | Performed by: PHYSICIAN ASSISTANT

## 2022-04-05 PROCEDURE — 3074F PR MOST RECENT SYSTOLIC BLOOD PRESSURE < 130 MM HG: ICD-10-PCS | Mod: CPTII,S$GLB,, | Performed by: PHYSICIAN ASSISTANT

## 2022-04-05 PROCEDURE — 1159F PR MEDICATION LIST DOCUMENTED IN MEDICAL RECORD: ICD-10-PCS | Mod: CPTII,S$GLB,, | Performed by: PHYSICIAN ASSISTANT

## 2022-04-05 PROCEDURE — 3008F PR BODY MASS INDEX (BMI) DOCUMENTED: ICD-10-PCS | Mod: CPTII,S$GLB,, | Performed by: PHYSICIAN ASSISTANT

## 2022-04-05 PROCEDURE — 1160F RVW MEDS BY RX/DR IN RCRD: CPT | Mod: CPTII,S$GLB,, | Performed by: PHYSICIAN ASSISTANT

## 2022-04-05 PROCEDURE — 3080F DIAST BP >= 90 MM HG: CPT | Mod: CPTII,S$GLB,, | Performed by: PHYSICIAN ASSISTANT

## 2022-04-05 PROCEDURE — 99214 PR OFFICE/OUTPT VISIT, EST, LEVL IV, 30-39 MIN: ICD-10-PCS | Mod: S$GLB,,, | Performed by: PHYSICIAN ASSISTANT

## 2022-04-05 PROCEDURE — 3074F SYST BP LT 130 MM HG: CPT | Mod: CPTII,S$GLB,, | Performed by: PHYSICIAN ASSISTANT

## 2022-04-05 PROCEDURE — 99214 OFFICE O/P EST MOD 30 MIN: CPT | Mod: S$GLB,,, | Performed by: PHYSICIAN ASSISTANT

## 2022-04-05 PROCEDURE — 3080F PR MOST RECENT DIASTOLIC BLOOD PRESSURE >= 90 MM HG: ICD-10-PCS | Mod: CPTII,S$GLB,, | Performed by: PHYSICIAN ASSISTANT

## 2022-04-05 PROCEDURE — 1159F MED LIST DOCD IN RCRD: CPT | Mod: CPTII,S$GLB,, | Performed by: PHYSICIAN ASSISTANT

## 2022-04-05 PROCEDURE — 3008F BODY MASS INDEX DOCD: CPT | Mod: CPTII,S$GLB,, | Performed by: PHYSICIAN ASSISTANT

## 2022-04-05 PROCEDURE — 99999 PR PBB SHADOW E&M-EST. PATIENT-LVL V: ICD-10-PCS | Mod: PBBFAC,,, | Performed by: PHYSICIAN ASSISTANT

## 2022-04-05 PROCEDURE — 99999 PR PBB SHADOW E&M-EST. PATIENT-LVL V: CPT | Mod: PBBFAC,,, | Performed by: PHYSICIAN ASSISTANT

## 2022-04-05 RX ORDER — MUPIROCIN 20 MG/G
OINTMENT TOPICAL
Status: CANCELLED | OUTPATIENT
Start: 2022-04-05

## 2022-04-05 RX ORDER — CLINDAMYCIN PHOSPHATE 900 MG/50ML
900 INJECTION, SOLUTION INTRAVENOUS
Status: CANCELLED | OUTPATIENT
Start: 2022-04-05

## 2022-04-05 NOTE — TELEPHONE ENCOUNTER
"----- Message from Irlanda Saucedo MA sent at 4/5/2022 12:11 PM CDT -----  Contact: @[phone]  Should trauma see this or hand?   ----- Message -----  From: Frank Mcgowan  Sent: 4/5/2022  12:04 PM CDT  To: [facility] Clinical Support    2nd Request: Patient calling to schedule a NP appt  from the referral, system denied scheduling "yes" to significant fall, pls call         "

## 2022-04-05 NOTE — PROGRESS NOTES
Faxed employee work status letter to [employer] at [phone] as requested by pt.

## 2022-04-05 NOTE — PROGRESS NOTES
Subjective:      Patient ID: Ani Montoya is a 32 y.o. female.    Chief Complaint: Injury, Pain, and Swelling of the Right Wrist    Principal Orthopedic Problem: right distal radius fracture     Relevant Medical History: according to chart    PMHX: mark    Lives at home with dog  Occupation: ICU nurse  Left handed  Prior to injury  Independent community ambulator, gait aids   Denies history of stroke, heart attack, cancer, blood clot, diabetes  Denies tobacco use  Denied chronic pain medication use prior to injury    HPI    Patient is a 32 year old LHD female who presented to the ED on 04/02/22 with right wrist pain after FOOSH the day prior.   RADS:  transverse type fracture involving the distal radial meta epiphyseal junction with slight impaction and minimal displacement  Patient was treated in a sugar tong splint. She stated that she is doing ok. Pain is controlled. Se denied numbness or tingling.     Review of Systems   Constitutional: Negative for chills and fever.   Cardiovascular: Negative for chest pain.   Respiratory: Negative for cough and shortness of breath.    Skin: Negative for color change, dry skin, itching, nail changes, poor wound healing and rash.   Musculoskeletal:        Right wrist fracture    Neurological: Negative for dizziness.   Psychiatric/Behavioral: Negative for altered mental status. The patient is not nervous/anxious.    All other systems reviewed and are negative.        Objective:      General    Constitutional: She is oriented to person, place, and time. She appears well-developed and well-nourished. No distress.   HENT:   Head: Atraumatic.   Eyes: Conjunctivae are normal.   Cardiovascular: Normal rate.    Pulmonary/Chest: Effort normal.   Neurological: She is alert and oriented to person, place, and time.   Psychiatric: She has a normal mood and affect. Her behavior is normal.             Right Hand/Wrist Exam     Comments:  Splint in place, moderate swellign to hand  and fingers.   splint removed  No lesions or skin breakdown  NVI.           RADS: reviewed by myself and / Onofre    transverse type fracture involving the distal radial meta epiphyseal junction with slight impaction and minimal displacement      Assessment:       Encounter Diagnoses   Name Primary?    Closed fracture of distal end of right radius, unspecified fracture morphology, initial encounter Yes    Distal radius fracture, right           Plan:       Discussed treatment options with patient. Operative vs non-operative treatment discussed with patient. Recommend operative fixation. Patient agreed.  Plan is for ORIF of right distal radius on 04/07/22 with .      - Sugar tong splint  - rest ice and elevation to reduce swelling.    Discussed proper and consistent elevation of extremities, above the level of the heart, while at rest, to help control/improve edema and decrease pain.  - NWB,   - Pain medication: refill needed, no controlled OTC medication   - Discussed pain medication refill policy.    - Discussed multimodal approach to pain control and that goal is to be off of narcotic pain medication by 2 weeks post op.      - consents signed,   - not taking blood thinners       -Discussed COVID19 with the patient, they are aware of our current policies and procedures, were given the option of delaying surgery, and they elect to proceed. Covid testing to be done 48 hours prior to surgery.  - Vaccinated ; COVID 12/31/21    Pre, glory, and post operative procedure and expectations were discussed.  Questions were answered. The patient has been educated and is ready to proceed with surgery.  Approximately 30 minutes was spent discussing surgical outcomes, plans, procedures, pre, glory, and post operative expectations and care. The risks, benefits and alternatives to surgery were discussed with the patient at great length.  These include bleeding, infection, vessel/nerve damage, pain, numbness,  tingling, complex regional pain syndrome, hardware/surgical failure, need for further surgery, malunion, nonunion, DVT, PE, arthritis and death. He also understands that the risks of surgery may be greater for some patients due to health or lifestyle issues, such as a current condition or a history of heart disease, obesity, clotting disorders, recurrent infections, smoking, sedentary lifestyle, or noncompliance with medications, therapy, or followup. The degree of the increased risk is hard to estimate with any degree of precision.  Patient states an understanding and wishes to proceed with surgery.   All questions were answered.  No guarantees were implied or stated.  Informed consent was obtained  The patient will contact us if the have any questions, concerns, and changes in their medical condition prior to surgery.        Patient seen and examined by me. Agree with above PA note.    We have discussed conservative vs. surgical treatment as well as risks, benefits and alternatives for right distal radius fracture.  Conservative measures have been exhausted and she would like to proceed with surgery. Surgery would include ORIF right distal radius fracture. Light use of the hand will be indicated for the first 4-6 weeks.     We have discussed risks of hand surgery which include but are not limited to blood clots in the legs that can travel to the lungs (pulmonary embolism). Pulmonary embolism can cause shortness of breath, chest pain, and even shock. Other risks include urinary tract infection, nausea and vomiting (usually related to pain medication), chronic pain, bleeding, nerve damage, blood vessel injury, scarring and infection of the hand which can require re-operation. Furthermore, the risks of anesthesia include potential heart, lung, kidney, and liver damage.  Informed consent was obtained.  The patient understands and would like to proceed with surgery in the near future.

## 2022-04-05 NOTE — TELEPHONE ENCOUNTER
Spoke with pt.  Scheduled her to see Theodore Hannah NP today at 130 pm for right wrist fx.  Pt verbalized understanding

## 2022-04-06 ENCOUNTER — TELEPHONE (OUTPATIENT)
Dept: ORTHOPEDICS | Facility: CLINIC | Age: 32
End: 2022-04-06
Payer: COMMERCIAL

## 2022-04-06 ENCOUNTER — ANESTHESIA EVENT (OUTPATIENT)
Dept: SURGERY | Facility: HOSPITAL | Age: 32
End: 2022-04-06
Payer: COMMERCIAL

## 2022-04-06 NOTE — TELEPHONE ENCOUNTER
Contacted patient with arrival time and pre op instructions. Patient was instructed to remove all artifical nails and/or nail polish. Nothing to eat or drink after 12 midnight. Only blood pressure medication is allowed the morning of with a small sip of water. Patient agrees to these terms and will be in attendance.

## 2022-04-06 NOTE — H&P (VIEW-ONLY)
Subjective:      Patient ID: Ani Montoya is a 32 y.o. female.    Chief Complaint: Injury, Pain, and Swelling of the Right Wrist    Principal Orthopedic Problem: right distal radius fracture     Relevant Medical History: according to chart    PMHX: mark    Lives at home with dog  Occupation: ICU nurse  Left handed  Prior to injury  Independent community ambulator, gait aids   Denies history of stroke, heart attack, cancer, blood clot, diabetes  Denies tobacco use  Denied chronic pain medication use prior to injury    HPI    Patient is a 32 year old LHD female who presented to the ED on 04/02/22 with right wrist pain after FOOSH the day prior.   RADS:  transverse type fracture involving the distal radial meta epiphyseal junction with slight impaction and minimal displacement  Patient was treated in a sugar tong splint    Past Medical History:   Diagnosis Date    Hypopituitarism     Osteoporosis     Primary amenorrhea      Past Surgical History:   Procedure Laterality Date    breast aug Bilateral 2012    OPEN REDUCTION AND INTERNAL FIXATION (ORIF) OF FRACTURE OF TIBIAL PLATEAU Right 8/18/2020    Procedure: ORIF, FRACTURE, TIBIA, PLATEAU;  Surgeon: Jordy Ortega MD;  Location: [facility] OR [address] [place] [address];  Service: Orthopedics;  Laterality: Right;     Social History     Occupational History    Not on file   Tobacco Use    Smoking status: Never Smoker    Smokeless tobacco: Never Used   Substance and Sexual Activity    Alcohol use: Yes     Comment: occ    Drug use: No    Sexual activity: Yes     Partners: Male      ROS  Current Outpatient Medications on File Prior to Visit   Medication Sig Dispense Refill    cholecalciferol, vitamin D3, (VITAMIN D3) 25 mcg (1,000 unit) capsule Take 1 capsule (1,000 Units total) by mouth once daily. 90 capsule 3    cholecalciferol, vitamin D3, (VITAMIN D3) 25 mcg (1,000 unit) capsule Take 1 capsule (1,000 Units total) by mouth once daily. 30 capsule 11    estradioL  "(ESTRACE) 0.5 MG tablet Take 1 tablet by mouth once daily 90 tablet 0    [] HYDROcodone-acetaminophen (NORCO) 5-325 mg per tablet Take 1 tablet by mouth every 4 (four) hours as needed for Pain. 18 tablet 0    naproxen (NAPROSYN) 500 MG tablet Take 1 tablet (500 mg total) by mouth 2 (two) times daily with meals. 14 tablet 0    progesterone (PROMETRIUM) 100 MG capsule TAKE 1 CAPSULE BY MOUTH NIGHTLY 12 capsule 0    amitriptyline (ELAVIL) 25 MG tablet Take 1 tablet (25 mg total) by mouth nightly as needed for Insomnia or Pain. (Patient not taking: Reported on 2021) 30 tablet 0    calcium-vitamin D3 (OS-MICAH 500 + D3) 500 mg(1,250mg) -200 unit per tablet Take 1 tablet by mouth 2 (two) times daily with meals. (Patient not taking: Reported on 2021) 60 tablet 11    ergocalciferol (ERGOCALCIFEROL) 50,000 unit Cap Take 1 capsule (50,000 Units total) by mouth every 7 days. (Patient not taking: Reported on 2021) 12 capsule 1    ibuprofen (ADVIL,MOTRIN) 600 MG tablet Take 1 tablet (600 mg total) by mouth every 6 (six) hours as needed for Pain. 30 tablet 0    medroxyPROGESTERone (PROVERA) 10 MG tablet Take 0.5 tablets (5 mg total) by mouth once daily. 90 tablet 3    somatropin (NORDITROPIN FLEXPRO) 5 mg/1.5 mL (3.3 mg/mL) PnIj Inject 0.2 mg into the skin once daily. 1.5 mL 6     No current facility-administered medications on file prior to visit.     Review of patient's allergies indicates:  No Known Allergies      Objective:      Vitals:    22 1352   BP: (!) 126/92   Pulse: 72   Resp: 18   Weight: 49.4 kg (108 lb 14.5 oz)   Height: 4' 11" (1.499 m)     Ortho Exam     Gen: WD, WN in NAD   HEENT: NC/AT   Heart: RR   Resp: unlabored breathing   Skin: intact, no lesions pertinent to the surgery site    Assessment:       1. Closed fracture of distal end of right radius, unspecified fracture morphology, initial encounter    2. Distal radius fracture, right          Plan:       Surgical intervention " per

## 2022-04-06 NOTE — H&P
Subjective:      Patient ID: Ani Montoya is a 32 y.o. female.    Chief Complaint: Injury, Pain, and Swelling of the Right Wrist    Principal Orthopedic Problem: right distal radius fracture     Relevant Medical History: according to chart    PMHX: mark    Lives at home with dog  Occupation: ICU nurse  Left handed  Prior to injury  Independent community ambulator, gait aids   Denies history of stroke, heart attack, cancer, blood clot, diabetes  Denies tobacco use  Denied chronic pain medication use prior to injury    HPI    Patient is a 32 year old LHD female who presented to the ED on 04/02/22 with right wrist pain after FOOSH the day prior.   RADS:  transverse type fracture involving the distal radial meta epiphyseal junction with slight impaction and minimal displacement  Patient was treated in a sugar tong splint    Past Medical History:   Diagnosis Date    Hypopituitarism     Osteoporosis     Primary amenorrhea      Past Surgical History:   Procedure Laterality Date    breast aug Bilateral 2012    OPEN REDUCTION AND INTERNAL FIXATION (ORIF) OF FRACTURE OF TIBIAL PLATEAU Right 8/18/2020    Procedure: ORIF, FRACTURE, TIBIA, PLATEAU;  Surgeon: Jordy Ortega MD;  Location: [facility] OR [address] [place] [address];  Service: Orthopedics;  Laterality: Right;     Social History     Occupational History    Not on file   Tobacco Use    Smoking status: Never Smoker    Smokeless tobacco: Never Used   Substance and Sexual Activity    Alcohol use: Yes     Comment: occ    Drug use: No    Sexual activity: Yes     Partners: Male      ROS  Current Outpatient Medications on File Prior to Visit   Medication Sig Dispense Refill    cholecalciferol, vitamin D3, (VITAMIN D3) 25 mcg (1,000 unit) capsule Take 1 capsule (1,000 Units total) by mouth once daily. 90 capsule 3    cholecalciferol, vitamin D3, (VITAMIN D3) 25 mcg (1,000 unit) capsule Take 1 capsule (1,000 Units total) by mouth once daily. 30 capsule 11    estradioL  "(ESTRACE) 0.5 MG tablet Take 1 tablet by mouth once daily 90 tablet 0    [] HYDROcodone-acetaminophen (NORCO) 5-325 mg per tablet Take 1 tablet by mouth every 4 (four) hours as needed for Pain. 18 tablet 0    naproxen (NAPROSYN) 500 MG tablet Take 1 tablet (500 mg total) by mouth 2 (two) times daily with meals. 14 tablet 0    progesterone (PROMETRIUM) 100 MG capsule TAKE 1 CAPSULE BY MOUTH NIGHTLY 12 capsule 0    amitriptyline (ELAVIL) 25 MG tablet Take 1 tablet (25 mg total) by mouth nightly as needed for Insomnia or Pain. (Patient not taking: Reported on 2021) 30 tablet 0    calcium-vitamin D3 (OS-MICAH 500 + D3) 500 mg(1,250mg) -200 unit per tablet Take 1 tablet by mouth 2 (two) times daily with meals. (Patient not taking: Reported on 2021) 60 tablet 11    ergocalciferol (ERGOCALCIFEROL) 50,000 unit Cap Take 1 capsule (50,000 Units total) by mouth every 7 days. (Patient not taking: Reported on 2021) 12 capsule 1    ibuprofen (ADVIL,MOTRIN) 600 MG tablet Take 1 tablet (600 mg total) by mouth every 6 (six) hours as needed for Pain. 30 tablet 0    medroxyPROGESTERone (PROVERA) 10 MG tablet Take 0.5 tablets (5 mg total) by mouth once daily. 90 tablet 3    somatropin (NORDITROPIN FLEXPRO) 5 mg/1.5 mL (3.3 mg/mL) PnIj Inject 0.2 mg into the skin once daily. 1.5 mL 6     No current facility-administered medications on file prior to visit.     Review of patient's allergies indicates:  No Known Allergies      Objective:      Vitals:    22 1352   BP: (!) 126/92   Pulse: 72   Resp: 18   Weight: 49.4 kg (108 lb 14.5 oz)   Height: 4' 11" (1.499 m)     Ortho Exam     Gen: WD, WN in NAD   HEENT: NC/AT   Heart: RR   Resp: unlabored breathing   Skin: intact, no lesions pertinent to the surgery site    Assessment:       1. Closed fracture of distal end of right radius, unspecified fracture morphology, initial encounter    2. Distal radius fracture, right          Plan:       Surgical intervention " per

## 2022-04-07 ENCOUNTER — TELEPHONE (OUTPATIENT)
Dept: ORTHOPEDICS | Facility: CLINIC | Age: 32
End: 2022-04-07

## 2022-04-07 ENCOUNTER — ANESTHESIA (OUTPATIENT)
Dept: SURGERY | Facility: HOSPITAL | Age: 32
End: 2022-04-07
Payer: COMMERCIAL

## 2022-04-07 ENCOUNTER — HOSPITAL ENCOUNTER (OUTPATIENT)
Facility: HOSPITAL | Age: 32
Discharge: HOME | End: 2022-04-07
Attending: ORTHOPAEDIC SURGERY | Admitting: ORTHOPAEDIC SURGERY
Payer: COMMERCIAL

## 2022-04-07 VITALS
HEIGHT: 59 IN | OXYGEN SATURATION: 98 % | DIASTOLIC BLOOD PRESSURE: 59 MMHG | TEMPERATURE: 98 F | WEIGHT: 108 LBS | HEART RATE: 54 BPM | BODY MASS INDEX: 21.77 KG/M2 | SYSTOLIC BLOOD PRESSURE: 99 MMHG | RESPIRATION RATE: 22 BRPM

## 2022-04-07 DIAGNOSIS — S62.101A WRIST FRACTURE, CLOSED, RIGHT, INITIAL ENCOUNTER: Primary | ICD-10-CM

## 2022-04-07 DIAGNOSIS — S52.501A CLOSED FRACTURE OF DISTAL END OF RIGHT RADIUS, UNSPECIFIED FRACTURE MORPHOLOGY, INITIAL ENCOUNTER: ICD-10-CM

## 2022-04-07 DIAGNOSIS — S52.501A DISTAL RADIUS FRACTURE, RIGHT: ICD-10-CM

## 2022-04-07 DIAGNOSIS — S52.501G CLOSED FRACTURE OF DISTAL END OF RIGHT RADIUS WITH DELAYED HEALING, UNSPECIFIED FRACTURE MORPHOLOGY, SUBSEQUENT ENCOUNTER: ICD-10-CM

## 2022-04-07 LAB
B-HCG UR QL: NEGATIVE
CTP QC/QA: YES

## 2022-04-07 PROCEDURE — 27201423 OPTIME MED/SURG SUP & DEVICES STERILE SUPPLY: Performed by: ORTHOPAEDIC SURGERY

## 2022-04-07 PROCEDURE — 63600175 PHARM REV CODE 636 W HCPCS: Performed by: NURSE ANESTHETIST, CERTIFIED REGISTERED

## 2022-04-07 PROCEDURE — D9220A PRA ANESTHESIA: Mod: ,,, | Performed by: ANESTHESIOLOGY

## 2022-04-07 PROCEDURE — 64415 NJX AA&/STRD BRCH PLXS IMG: CPT | Mod: 59,RT,, | Performed by: ANESTHESIOLOGY

## 2022-04-07 PROCEDURE — 71000015 HC POSTOP RECOV 1ST HR: Performed by: ORTHOPAEDIC SURGERY

## 2022-04-07 PROCEDURE — 76942 PR U/S GUIDANCE FOR NEEDLE GUIDANCE: ICD-10-PCS | Mod: 26,,, | Performed by: ANESTHESIOLOGY

## 2022-04-07 PROCEDURE — 36000708 HC OR TIME LEV III 1ST 15 MIN: Performed by: ORTHOPAEDIC SURGERY

## 2022-04-07 PROCEDURE — 76942 ECHO GUIDE FOR BIOPSY: CPT | Mod: 26,,, | Performed by: ANESTHESIOLOGY

## 2022-04-07 PROCEDURE — 63600175 PHARM REV CODE 636 W HCPCS: Performed by: STUDENT IN AN ORGANIZED HEALTH CARE EDUCATION/TRAINING PROGRAM

## 2022-04-07 PROCEDURE — 99900035 HC TECH TIME PER 15 MIN (STAT)

## 2022-04-07 PROCEDURE — D9220A PRA ANESTHESIA: ICD-10-PCS | Mod: ,,, | Performed by: ANESTHESIOLOGY

## 2022-04-07 PROCEDURE — 64415 PR NERVE BLOCK INJ, ANES/STEROID, BRACHIAL PLEXUS, INCL IMAG GUIDANCE: ICD-10-PCS | Mod: 59,RT,, | Performed by: ANESTHESIOLOGY

## 2022-04-07 PROCEDURE — 25000003 PHARM REV CODE 250: Performed by: ANESTHESIOLOGY

## 2022-04-07 PROCEDURE — 25000003 PHARM REV CODE 250: Performed by: NURSE ANESTHETIST, CERTIFIED REGISTERED

## 2022-04-07 PROCEDURE — 25607 PR OPEN RX DISTAL RADIUS FX, EXTRA-ARTICULAR: ICD-10-PCS | Mod: RT,,, | Performed by: ORTHOPAEDIC SURGERY

## 2022-04-07 PROCEDURE — 81025 URINE PREGNANCY TEST: CPT | Performed by: ORTHOPAEDIC SURGERY

## 2022-04-07 PROCEDURE — 25000003 PHARM REV CODE 250: Performed by: PHYSICIAN ASSISTANT

## 2022-04-07 PROCEDURE — C1713 ANCHOR/SCREW BN/BN,TIS/BN: HCPCS | Performed by: ORTHOPAEDIC SURGERY

## 2022-04-07 PROCEDURE — 63600175 PHARM REV CODE 636 W HCPCS: Performed by: ANESTHESIOLOGY

## 2022-04-07 PROCEDURE — 64415 NJX AA&/STRD BRCH PLXS IMG: CPT | Performed by: ANESTHESIOLOGY

## 2022-04-07 PROCEDURE — 94761 N-INVAS EAR/PLS OXIMETRY MLT: CPT

## 2022-04-07 PROCEDURE — 37000009 HC ANESTHESIA EA ADD 15 MINS: Performed by: ORTHOPAEDIC SURGERY

## 2022-04-07 PROCEDURE — 25607 OPTX DST RD XARTC FX/EPI SEP: CPT | Mod: RT,,, | Performed by: ORTHOPAEDIC SURGERY

## 2022-04-07 PROCEDURE — 36000709 HC OR TIME LEV III EA ADD 15 MIN: Performed by: ORTHOPAEDIC SURGERY

## 2022-04-07 PROCEDURE — C1769 GUIDE WIRE: HCPCS | Performed by: ORTHOPAEDIC SURGERY

## 2022-04-07 PROCEDURE — 71000033 HC RECOVERY, INTIAL HOUR: Performed by: ORTHOPAEDIC SURGERY

## 2022-04-07 PROCEDURE — 37000008 HC ANESTHESIA 1ST 15 MINUTES: Performed by: ORTHOPAEDIC SURGERY

## 2022-04-07 PROCEDURE — 25000003 PHARM REV CODE 250: Performed by: ORTHOPAEDIC SURGERY

## 2022-04-07 RX ORDER — PHENYLEPHRINE HYDROCHLORIDE 10 MG/ML
INJECTION INTRAVENOUS
Status: DISCONTINUED | OUTPATIENT
Start: 2022-04-07 | End: 2022-04-07

## 2022-04-07 RX ORDER — BUPIVACAINE HYDROCHLORIDE 5 MG/ML
INJECTION, SOLUTION EPIDURAL; INTRACAUDAL
Status: COMPLETED | OUTPATIENT
Start: 2022-04-07 | End: 2022-04-07

## 2022-04-07 RX ORDER — BUPIVACAINE HYDROCHLORIDE 5 MG/ML
INJECTION, SOLUTION EPIDURAL; INTRACAUDAL
Status: COMPLETED
Start: 2022-04-07 | End: 2022-04-07

## 2022-04-07 RX ORDER — OXYCODONE AND ACETAMINOPHEN 5; 325 MG/1; MG/1
1 TABLET ORAL EVERY 6 HOURS PRN
Qty: 15 TABLET | Refills: 0 | Status: SHIPPED | OUTPATIENT
Start: 2022-04-07

## 2022-04-07 RX ORDER — KETAMINE HCL IN 0.9 % NACL 50 MG/5 ML
SYRINGE (ML) INTRAVENOUS
Status: DISCONTINUED | OUTPATIENT
Start: 2022-04-07 | End: 2022-04-07

## 2022-04-07 RX ORDER — OXYCODONE HYDROCHLORIDE 5 MG/1
5 TABLET ORAL
Status: DISCONTINUED | OUTPATIENT
Start: 2022-04-07 | End: 2022-04-07 | Stop reason: HOSPADM

## 2022-04-07 RX ORDER — CELECOXIB 200 MG/1
400 CAPSULE ORAL
Status: COMPLETED | OUTPATIENT
Start: 2022-04-07 | End: 2022-04-07

## 2022-04-07 RX ORDER — ACETAMINOPHEN 500 MG
1000 TABLET ORAL
Status: COMPLETED | OUTPATIENT
Start: 2022-04-07 | End: 2022-04-07

## 2022-04-07 RX ORDER — ROPIVACAINE HYDROCHLORIDE 5 MG/ML
INJECTION, SOLUTION EPIDURAL; INFILTRATION; PERINEURAL
Status: DISCONTINUED
Start: 2022-04-07 | End: 2022-04-07 | Stop reason: HOSPADM

## 2022-04-07 RX ORDER — MUPIROCIN 20 MG/G
OINTMENT TOPICAL
Status: DISCONTINUED | OUTPATIENT
Start: 2022-04-07 | End: 2022-04-07 | Stop reason: HOSPADM

## 2022-04-07 RX ORDER — HYDROCODONE BITARTRATE AND ACETAMINOPHEN 5; 325 MG/1; MG/1
1 TABLET ORAL EVERY 4 HOURS PRN
Status: DISCONTINUED | OUTPATIENT
Start: 2022-04-07 | End: 2022-04-07 | Stop reason: HOSPADM

## 2022-04-07 RX ORDER — PROMETHAZINE HYDROCHLORIDE 25 MG/1
25 TABLET ORAL EVERY 6 HOURS PRN
Qty: 15 TABLET | Refills: 0 | Status: SHIPPED | OUTPATIENT
Start: 2022-04-07

## 2022-04-07 RX ORDER — FENTANYL CITRATE 50 UG/ML
25 INJECTION, SOLUTION INTRAMUSCULAR; INTRAVENOUS EVERY 5 MIN PRN
Status: DISPENSED | OUTPATIENT
Start: 2022-04-07

## 2022-04-07 RX ORDER — DEXAMETHASONE SODIUM PHOSPHATE 10 MG/ML
INJECTION INTRAMUSCULAR; INTRAVENOUS
Status: DISCONTINUED
Start: 2022-04-07 | End: 2022-04-07 | Stop reason: HOSPADM

## 2022-04-07 RX ORDER — CEFAZOLIN SODIUM/WATER 2 G/20 ML
2 SYRINGE (ML) INTRAVENOUS ONCE
Status: COMPLETED | OUTPATIENT
Start: 2022-04-07 | End: 2022-04-07

## 2022-04-07 RX ORDER — LIDOCAINE HYDROCHLORIDE 20 MG/ML
INJECTION INTRAVENOUS
Status: DISCONTINUED | OUTPATIENT
Start: 2022-04-07 | End: 2022-04-07

## 2022-04-07 RX ORDER — BACITRACIN ZINC 500 UNIT/G
OINTMENT (GRAM) TOPICAL
Status: DISCONTINUED | OUTPATIENT
Start: 2022-04-07 | End: 2022-04-07 | Stop reason: HOSPADM

## 2022-04-07 RX ORDER — FENTANYL CITRATE 50 UG/ML
25 INJECTION, SOLUTION INTRAMUSCULAR; INTRAVENOUS EVERY 5 MIN PRN
Status: DISCONTINUED | OUTPATIENT
Start: 2022-04-07 | End: 2022-04-07 | Stop reason: HOSPADM

## 2022-04-07 RX ORDER — CARBOXYMETHYLCELLULOSE SODIUM 10 MG/ML
GEL OPHTHALMIC
Status: DISCONTINUED | OUTPATIENT
Start: 2022-04-07 | End: 2022-04-07

## 2022-04-07 RX ORDER — DEXAMETHASONE SODIUM PHOSPHATE 4 MG/ML
INJECTION, SOLUTION INTRA-ARTICULAR; INTRALESIONAL; INTRAMUSCULAR; INTRAVENOUS; SOFT TISSUE
Status: DISCONTINUED | OUTPATIENT
Start: 2022-04-07 | End: 2022-04-07

## 2022-04-07 RX ORDER — PROPOFOL 10 MG/ML
VIAL (ML) INTRAVENOUS CONTINUOUS PRN
Status: DISCONTINUED | OUTPATIENT
Start: 2022-04-07 | End: 2022-04-07

## 2022-04-07 RX ORDER — CLONIDINE 100 UG/ML
INJECTION, SOLUTION EPIDURAL
Status: DISCONTINUED
Start: 2022-04-07 | End: 2022-04-07 | Stop reason: HOSPADM

## 2022-04-07 RX ORDER — CLINDAMYCIN PHOSPHATE 900 MG/50ML
900 INJECTION, SOLUTION INTRAVENOUS
Status: DISCONTINUED | OUTPATIENT
Start: 2022-04-07 | End: 2022-04-07 | Stop reason: HOSPADM

## 2022-04-07 RX ORDER — PROPOFOL 10 MG/ML
VIAL (ML) INTRAVENOUS
Status: DISCONTINUED | OUTPATIENT
Start: 2022-04-07 | End: 2022-04-07

## 2022-04-07 RX ORDER — CEFAZOLIN SODIUM/WATER 2 G/20 ML
2 SYRINGE (ML) INTRAVENOUS
Status: DISCONTINUED | OUTPATIENT
Start: 2022-04-07 | End: 2022-04-07 | Stop reason: HOSPADM

## 2022-04-07 RX ORDER — FAMOTIDINE 10 MG/ML
INJECTION INTRAVENOUS
Status: DISCONTINUED | OUTPATIENT
Start: 2022-04-07 | End: 2022-04-07

## 2022-04-07 RX ORDER — ONDANSETRON 2 MG/ML
INJECTION INTRAMUSCULAR; INTRAVENOUS
Status: DISCONTINUED | OUTPATIENT
Start: 2022-04-07 | End: 2022-04-07

## 2022-04-07 RX ORDER — MIDAZOLAM HYDROCHLORIDE 1 MG/ML
0.5 INJECTION INTRAMUSCULAR; INTRAVENOUS
Status: DISPENSED | OUTPATIENT
Start: 2022-04-07

## 2022-04-07 RX ADMIN — MUPIROCIN: 20 OINTMENT TOPICAL at 07:04

## 2022-04-07 RX ADMIN — BUPIVACAINE HYDROCHLORIDE 30 ML: 5 INJECTION, SOLUTION EPIDURAL; INTRACAUDAL; PERINEURAL at 07:04

## 2022-04-07 RX ADMIN — Medication 10 MG: at 08:04

## 2022-04-07 RX ADMIN — PHENYLEPHRINE HYDROCHLORIDE 100 MCG: 10 INJECTION INTRAVENOUS at 08:04

## 2022-04-07 RX ADMIN — FAMOTIDINE 20 MG: 10 INJECTION, SOLUTION INTRAVENOUS at 07:04

## 2022-04-07 RX ADMIN — ONDANSETRON 4 MG: 2 INJECTION, SOLUTION INTRAMUSCULAR; INTRAVENOUS at 09:04

## 2022-04-07 RX ADMIN — ACETAMINOPHEN 1000 MG: 500 TABLET ORAL at 07:04

## 2022-04-07 RX ADMIN — CELECOXIB 400 MG: 200 CAPSULE ORAL at 07:04

## 2022-04-07 RX ADMIN — SODIUM CHLORIDE: 9 INJECTION, SOLUTION INTRAVENOUS at 09:04

## 2022-04-07 RX ADMIN — DEXAMETHASONE SODIUM PHOSPHATE 4 MG: 4 INJECTION, SOLUTION INTRAMUSCULAR; INTRAVENOUS at 08:04

## 2022-04-07 RX ADMIN — PROPOFOL 150 MCG/KG/MIN: 10 INJECTION, EMULSION INTRAVENOUS at 08:04

## 2022-04-07 RX ADMIN — PROPOFOL 30 MG: 10 INJECTION, EMULSION INTRAVENOUS at 08:04

## 2022-04-07 RX ADMIN — MIDAZOLAM 4 MG: 1 INJECTION INTRAMUSCULAR; INTRAVENOUS at 07:04

## 2022-04-07 RX ADMIN — LIDOCAINE HYDROCHLORIDE 40 MG: 20 INJECTION, SOLUTION INTRAVENOUS at 08:04

## 2022-04-07 RX ADMIN — SODIUM CHLORIDE: 9 INJECTION, SOLUTION INTRAVENOUS at 07:04

## 2022-04-07 RX ADMIN — Medication 2 G: at 08:04

## 2022-04-07 RX ADMIN — CARBOXYMETHYLCELLULOSE SODIUM 4 DROP: 10 GEL OPHTHALMIC at 08:04

## 2022-04-07 NOTE — TRANSFER OF CARE
"Anesthesia Transfer of Care Note    Patient: Ani Montoya    Procedure(s) Performed: Procedure(s) (LRB):  ORIF, WRIST mini C arm, med artist (Right)    Patient location: PACU    Anesthesia Type: general    Transport from OR: Transported from OR on room air with adequate spontaneous ventilation    Post pain: adequate analgesia    Post assessment: no apparent anesthetic complications and tolerated procedure well    Post vital signs: stable    Level of consciousness: awake, alert and oriented    Nausea/Vomiting: no nausea/vomiting    Complications: none    Transfer of care protocol was followed      Last vitals:   Visit Vitals  BP (!) 86/54 (BP Location: Left arm, Patient Position: Lying)   Pulse 60   Temp 36.5 °C (97.7 °F) (Oral)   Resp (!) 21   Ht 4' 11" (1.499 m)   Wt 49 kg (108 lb)   SpO2 100%   Breastfeeding No   BMI 21.81 kg/m²     "

## 2022-04-07 NOTE — DISCHARGE INSTRUCTIONS
AFTER HAND SURGERY    DOS:  Keep affected wrist elevated above the level of your heart  Check circulation frequently in fingers by pressing on the nail bed. Nail bed should turn white and then pink when released.  Exercise fingers to promote circulation.  Advance diet as tolerated  Resume home medications today.      DONT:  No driving for 24 hours or while taking narcotic pain medication  DO NOT TAKE ADDITIONAL TYLENOL/ACETAMINOPHEN WHILE TAKING NARCOTIC PAIN MEDICATION THAT CONTAINS TYLENOL/ACETAMINOPHEN.    CALL PHYSICIAN FOR:  Obvious bleeding  Excessive swelling  Drainage (pus) from the wound  Pain unrelieved by pain medication.  If dressing gets wet

## 2022-04-07 NOTE — ANESTHESIA PREPROCEDURE EVALUATION
04/07/2022  Ani Montoya is a 32 y.o., female   Pre-operative evaluation for Procedure(s) (LRB):  ORIF, WRIST mini C arm, med artist (Right)    Ani Montoya is a 32 y.o. female     Patient Active Problem List   Diagnosis    Primary amenorrhea    Hyperprolactinemia    Short stature    Growth hormone deficiency    Ectopic pituitary tissue    Partial agenesis of corpus callosum    Back pain    Attention deficit    Hypopituitarism    Osteoporosis    Closed bicondylar fracture of right tibial plateau    Osteoporosis with current pathological fracture    Decreased strength    Decreased range of motion (ROM) of right knee    Decreased mobility and endurance       Review of patient's allergies indicates:  No Known Allergies    No current facility-administered medications on file prior to encounter.     Current Outpatient Medications on File Prior to Encounter   Medication Sig Dispense Refill    cholecalciferol, vitamin D3, (VITAMIN D3) 25 mcg (1,000 unit) capsule Take 1 capsule (1,000 Units total) by mouth once daily. 30 capsule 11    estradioL (ESTRACE) 0.5 MG tablet Take 1 tablet by mouth once daily 90 tablet 0    amitriptyline (ELAVIL) 25 MG tablet Take 1 tablet (25 mg total) by mouth nightly as needed for Insomnia or Pain. (Patient not taking: Reported on 7/2/2021) 30 tablet 0    calcium-vitamin D3 (OS-MICAH 500 + D3) 500 mg(1,250mg) -200 unit per tablet Take 1 tablet by mouth 2 (two) times daily with meals. 60 tablet 11    cholecalciferol, vitamin D3, (VITAMIN D3) 25 mcg (1,000 unit) capsule Take 1 capsule (1,000 Units total) by mouth once daily. 90 capsule 3    ergocalciferol (ERGOCALCIFEROL) 50,000 unit Cap Take 1 capsule (50,000 Units total) by mouth every 7 days. (Patient not taking: Reported on 7/2/2021) 12 capsule 1    ibuprofen (ADVIL,MOTRIN) 600 MG tablet Take 1 tablet (600 mg  total) by mouth every 6 (six) hours as needed for Pain. 30 tablet 0    medroxyPROGESTERone (PROVERA) 10 MG tablet Take 0.5 tablets (5 mg total) by mouth once daily. 90 tablet 3    naproxen (NAPROSYN) 500 MG tablet Take 1 tablet (500 mg total) by mouth 2 (two) times daily with meals. 14 tablet 0    progesterone (PROMETRIUM) 100 MG capsule TAKE 1 CAPSULE BY MOUTH NIGHTLY 12 capsule 0    somatropin (NORDITROPIN FLEXPRO) 5 mg/1.5 mL (3.3 mg/mL) PnIj Inject 0.2 mg into the skin once daily. 1.5 mL 6       Past Surgical History:   Procedure Laterality Date    breast aug Bilateral 2012    OPEN REDUCTION AND INTERNAL FIXATION (ORIF) OF FRACTURE OF TIBIAL PLATEAU Right 8/18/2020    Procedure: ORIF, FRACTURE, TIBIA, PLATEAU;  Surgeon: Jordy Ortega MD;  Location: [facility] OR [address] [place] [address];  Service: Orthopedics;  Laterality: Right;         CBC:  No results found for: WBC, RBC, HGB, HCT, PLT, MCV, MCH, MCHC    CMP:   No results found for: NA, K, CL, CO2, BUN, CREATININE, GLU, MG, PHOS, CALCIUM, ALBUMIN, PROT, ALKPHOS, ALT, AST, BILITOT    INR:  No results found for: PT, INR, PROTIME, APTT      Diagnostic Studies:      EKG:   Results for orders placed or performed during the hospital encounter of 08/17/20   EKG 12-lead    Collection Time: 08/17/20  8:21 PM    Narrative    Test Reason : Z01.810,    Vent. Rate : 086 BPM     Atrial Rate : 086 BPM     P-R Int : 120 ms          QRS Dur : 080 ms      QT Int : 390 ms       P-R-T Axes : 059 077 062 degrees     QTc Int : 466 ms    Normal sinus rhythm with sinus arrhythmia  Nonspecific T wave abnormality  Prolonged QT  Abnormal ECG  No previous ECGs available  Confirmed by ZACHERY DUMONT, Edmundo ([id number]) on 8/18/2020 8:41:51 AM    Referred By: TIFFANY   SELF           Confirmed By:Edmundo BINGHAM MD        2D Echo:  No results found for this or any previous visit.    Stress Test:   No results found for this or any previous visit.      Pre-op Vitals [04/07/22 0641]   BP Pulse Resp Temp SpO2   124/82 67  "18 36.5 °C (97.7 °F) 98 %      Height Weight BMI (Calculated)     4' 11" 108 lb 21.8         Pre-op Vitals [04/07/22 0641]   BP Pulse Resp Temp SpO2   124/82 67 18 36.5 °C (97.7 °F) 98 %      Height Weight BMI (Calculated)     4' 11" 108 lb 21.8        .      Pre-op Assessment          Review of Systems      Physical Exam  General: Well nourished    Airway:  Mallampati: I / I  Mouth Opening: Normal  TM Distance: Normal  Tongue: Normal  Neck ROM: Normal ROM    Dental:  Intact    Chest/Lungs:  Clear to auscultation    Heart:  Rate: Normal  Rhythm: Regular Rhythm  Sounds: Normal        Anesthesia Plan  Type of Anesthesia, risks & benefits discussed:    Anesthesia Type: MAC, Gen ETT, Gen Supraglottic Airway, Gen Natural Airway, Regional  Intra-op Monitoring Plan: Standard ASA Monitors  Post Op Pain Control Plan: multimodal analgesia and peripheral nerve block  Induction:  IV  Airway Plan: Direct  Informed Consent: Informed consent signed with the Patient and all parties understand the risks and agree with anesthesia plan.  All questions answered.   ASA Score: 2  Day of Surgery Review of History & Physical: H&P Update referred to the surgeon/provider.    Ready For Surgery From Anesthesia Perspective.     .      "

## 2022-04-07 NOTE — PATIENT INSTRUCTIONS
Medication Instructions:     1. Take 600mg Ibuprofen every 8 hours as needed for pain.     2. Take 1000mg Tylenol up to twice a day as needed for pain. Do not take if taking Percocet.     3. Take 1 Percocet every 4 hours as needed for pain.     4. Take 25mg Phenergan every 6 hours as needed for nausea.     5. Take 100mg Colace up to 2 times daily as needed for constipation.

## 2022-04-07 NOTE — ANESTHESIA PROCEDURE NOTES
R SC SS    Patient location during procedure: pre-op   Block not for primary anesthetic.  Reason for block: at surgeon's request and post-op pain management   Post-op Pain Location: R wrist pain   Start time: 4/7/2022 7:20 AM  Timeout: 4/7/2022 7:17 AM   End time: 4/7/2022 7:25 AM    Staffing  Authorizing Provider: Mya Solano MD  Performing Provider: Mya Solano MD    Preanesthetic Checklist  Completed: patient identified, IV checked, site marked, risks and benefits discussed, surgical consent, monitors and equipment checked, pre-op evaluation and timeout performed  Peripheral Block  Patient position: supine  Prep: ChloraPrep  Patient monitoring: heart rate, cardiac monitor, continuous pulse ox, continuous capnometry and frequent blood pressure checks  Block type: supraclavicular  Laterality: right  Injection technique: single shot  Needle  Needle type: Stimuplex   Needle gauge: 22 G  Needle length: 2 in  Needle localization: anatomical landmarks and ultrasound guidance  Needle insertion depth: 2 cm   -ultrasound image captured on disc.  Assessment  Injection assessment: negative aspiration, negative parasthesia and local visualized surrounding nerve  Paresthesia pain: none  Heart rate change: no  Slow fractionated injection: yes  Pain Tolerance: comfortable throughout block and no complaints  Medications:    Medications: bupivacaine (pf) (MARCAINE) injection 0.5% - Perineural   30 mL - 4/7/2022 7:25:00 AM    Additional Notes  VSS.  [facility] RN monitoring vitals throughout procedure.  Patient tolerated procedure well.     0.5% bupiv with 1: 400 k epi

## 2022-04-07 NOTE — PLAN OF CARE
VSS. Patient able to tolerate oral liquids. Patient denies c/o pain. Dressing intact. Polar care intact, power adapter placed with patient belongings. No distress noted. Patient states she is ready for discharge. Discharge instructions reviewed with patient and family, verbalized understanding. IV discontinued with catheter tip intact. Friend at bedside to help patient dress. Patient wheeled to lobby via staff.

## 2022-04-07 NOTE — BRIEF OP NOTE
[facility] - Surgery (Hospital)  Brief Operative Note     SUMMARY     Surgery Date: 4/7/2022     Surgeon(s) and Role:     * Tari Adhikari MD - Primary    Assistant: Nicky Waller MD  Pre-op Diagnosis:  Closed fracture of distal end of right radius, unspecified fracture morphology, initial encounter [S52.501A]    Post-op Diagnosis:  Post-Op Diagnosis Codes:     * Closed fracture of distal end of right radius, unspecified fracture morphology, initial encounter [S52.501A]    Procedure(s) (LRB):  ORIF, WRIST mini C arm, med artist (Right)    Anesthesia: Choice    Description of the findings of the procedure: Distal radius fracture    Findings/Key Components: See operative report    Estimated Blood Loss: * No values recorded between 4/7/2022  8:17 AM and 4/7/2022  9:29 AM *         Specimens:   Specimen (24h ago, onward)            None          Implants:   Implant Name Type Inv. Item Serial No.  Lot No. LRB No. Used Action   2.5 ADAPTIVE II TRILOCK DISTRAD PLT VOL R 10 H, NARROW    [employer]  Right 1 Implanted   OLIVE WIRE    [employer]  Right 2 Implanted and Explanted   2.5MM X 10MM NON LOCKING CORTEX BONE SCREW    [employer]  Right 2 Implanted   2.5 X 16MM LOCKING SCREW    [employer]  Right 2 Implanted   2.5 X 14MM  LOCKING SCREW    [employer]  Right 4 Implanted   2.5 MM X 11MM NON-LOCKING CORTEX SCREW    [employer]  Right 1 Implanted   2.5MM X 16MM LOCKING SCREW    [employer]  Right 2 Implanted and Explanted       Complications: None    Discharge Note    SUMMARY     Admit Date: 4/7/2022    Discharge Date and Time:  04/07/2022 9:33 AM    Hospital Course: Patient was placed in observation status for the above procedure.  See above.  Postoperatively was discharged home from the PACU once criteria was met.    Final Diagnosis: Post-Op Diagnosis Codes:     * Closed fracture of distal end of right radius, unspecified fracture morphology, initial encounter [S52.501A]    Disposition: Home or Self Care    Follow Up/Patient  Instructions:     Medications:  Reconciled Home Medications:      Medication List      START taking these medications    oxyCODONE-acetaminophen 5-325 mg per tablet  Commonly known as: PERCOCET  Take 1 tablet by mouth every 6 (six) hours as needed for Pain.     promethazine 25 MG tablet  Commonly known as: PHENERGAN  Take 1 tablet (25 mg total) by mouth every 6 (six) hours as needed for Nausea.        CONTINUE taking these medications    * cholecalciferol (vitamin D3) 25 mcg (1,000 unit) capsule  Commonly known as: VITAMIN D3  Take 1 capsule (1,000 Units total) by mouth once daily.     * cholecalciferol (vitamin D3) 25 mcg (1,000 unit) capsule  Commonly known as: VITAMIN D3  Take 1 capsule (1,000 Units total) by mouth once daily.     estradioL 0.5 MG tablet  Commonly known as: ESTRACE  Take 1 tablet by mouth once daily     medroxyPROGESTERone 10 MG tablet  Commonly known as: PROVERA  Take 0.5 tablets (5 mg total) by mouth once daily.     naproxen 500 MG tablet  Commonly known as: NAPROSYN  Take 1 tablet (500 mg total) by mouth 2 (two) times daily with meals.     progesterone 100 MG capsule  Commonly known as: PROMETRIUM  TAKE 1 CAPSULE BY MOUTH NIGHTLY         * This list has 2 medication(s) that are the same as other medications prescribed for you. Read the directions carefully, and ask your doctor or other care provider to review them with you.            ASK your doctor about these medications    amitriptyline 25 MG tablet  Commonly known as: ELAVIL  Take 1 tablet (25 mg total) by mouth nightly as needed for Insomnia or Pain.     calcium-vitamin D3 500 mg-5 mcg (200 unit) per tablet  Commonly known as: OS-MICAH 500 + D3  Take 1 tablet by mouth 2 (two) times daily with meals.     ergocalciferol 50,000 unit Cap  Commonly known as: ERGOCALCIFEROL  Take 1 capsule (50,000 Units total) by mouth every 7 days.     ibuprofen 600 MG tablet  Commonly known as: ADVIL,MOTRIN  Take 1 tablet (600 mg total) by mouth every 6 (six)  hours as needed for Pain.     NORDITROPIN FLEXPRO 5 mg/1.5 mL (3.3 mg/mL) Pnij  Generic drug: somatropin  Inject 0.2 mg into the skin once daily.          No discharge procedures on file.

## 2022-04-07 NOTE — ANESTHESIA POSTPROCEDURE EVALUATION
Anesthesia Post Evaluation    Patient: Ani Montoya    Procedure(s) Performed: Procedure(s) (LRB):  ORIF, WRIST mini C arm, med artist (Right)    Final Anesthesia Type: general      Patient location during evaluation: PACU  Patient participation: Yes- Able to Participate  Level of consciousness: awake and alert  Post-procedure vital signs: reviewed and stable  Pain management: adequate  Airway patency: patent    PONV status at discharge: No PONV  Anesthetic complications: no      Cardiovascular status: blood pressure returned to baseline  Respiratory status: unassisted  Hydration status: euvolemic  Follow-up not needed.          Vitals Value Taken Time   BP 99/59 04/07/22 1002   Temp 36.6 °C (97.9 °F) 04/07/22 1015   Pulse 59 04/07/22 1020   Resp 28 04/07/22 1019   SpO2 95 % 04/07/22 1020   Vitals shown include unvalidated device data.      Event Time   Out of Recovery 10:15:00         Pain/Robb Score: Pain Rating Prior to Med Admin: 7 (4/7/2022  7:03 AM)  Robb Score: 9 (4/7/2022 10:15 AM)

## 2022-04-07 NOTE — OP NOTE
[facility])  Surgery Department  Operative Note    SUMMARY     Date of Procedure: 4/7/2022     Procedure:   1. Open reduction internal fixation of right 2 part intra-articular distal radius fracture.  2. Use of X-ray for fracture reduction.     Surgeon(s) and Role:     * Tari Adhikari MD - Primary    Assisting Surgeon: Nicky Waller    Pre-Operative Diagnosis: Closed fracture of distal end of right radius, unspecified fracture morphology, initial encounter [S52.501A]    Post-Operative Diagnosis: Post-Op Diagnosis Codes:     * Closed fracture of distal end of right radius, unspecified fracture morphology, initial encounter [S52.501A]    Anesthesia: Regional     Indication for Procedure:  32-year-old female with displaced right distal radius fracture.  Risks and benefits of the procedure were discussed with the patient and informed consent was obtained.     Description of the Findings of the Procedure: The patient was seen in the preoperative holding area and the right wrist was marked.  The patient was taken to the OR, placed supine on the table, and a padded hand table was used.  After Regional and MAC was administered without difficulty, a time-out procedure was performed identifying the patient, the operative site and the procedure to be performed. A tourniquet was placed on the arm and the right upper extremity was prepped and draped in standard sterile fashion.  The upper extremity was exsanguinated with an Esmarch bandage, and the tourniquet was inflated to 250 mm Hg.  A 15 blade scalpel was used to make a 5 cm incision volarly on the forearm. Littler scissors were used to dissect down to the FCR tendon.  The FCR sheath was sharply incised and the FCR was retracted radially.  The FPL was identified and retracted radially.  The pronator quadratus was exposed, it was incised and reflected radially with a Key elevator. The fracture site was exposed, a Zebulon elevator was used elevate the distal  fracture fragments. Traction was used to restore the height and proper radial tilt of the distal radial joint.  A [employer] volar locking plate was selected and provisionally secured with olive wires distally.  The oblong hole of the shaft was drilled and a screw was placed.  X-ray was used to confirm proper distal radius reduction, plate placement, and screw length.  The six distal locking screws were drilled and inserted, taking care not to violate the dorsal cortex.  Three total proximal shaft screws were used to secure the plate. X-ray was used to confirm plate placement and screw length, this was satisfactory.  Wrist range of motion was full and there was no instability of the DRUJ.  The wound was copiously irrigated with normal saline.  3-0 Vicryl was used to repair the pronator quadratus over the plate.  The neurovascular bundles were identified and protected throughout the case.  Bipolar electrocautery was used for hemostasis.  3-0 monocryl was used for subcutaneous closure.  4-0 Monocryl was used to close the skin in a subcuticular manner.  All instrument, sponge and needle counts were correct. Steri-Strips, 4 x 4, cast padding a volar splint and Coban were used to dress the wrist.  The tourniquet was deflated and the hand and finger was pink and well-perfused.  The patient tolerated the procedure well.  She was taken awake and alert to the recovery room.    Complications: No    Estimated Blood Loss (EBL): minimal           Implants:   Implant Name Type Inv. Item Serial No.  Lot No. LRB No. Used Action   2.5 ADAPTIVE II TRILOCK DISTRAD PLT VOL R 10 H, NARROW    [employer]  Right 1 Implanted   OLIVE WIRE    [employer]  Right 2 Implanted and Explanted   2.5MM X 10MM NON LOCKING CORTEX BONE SCREW    [employer]  Right 2 Implanted   2.5 X 16MM LOCKING SCREW    [employer]  Right 2 Implanted   2.5 X 14MM  LOCKING SCREW    [employer]  Right 4 Implanted   2.5 MM X 11MM NON-LOCKING CORTEX SCREW    [employer]  Right 1  Implanted   2.5MM X 16MM LOCKING SCREW    [employer]  Right 2 Implanted and Explanted       Specimens:   Specimen (24h ago, onward)            None                  Condition: Good    Disposition: PACU - hemodynamically stable.    Attestation: I was present and scrubbed for the entire procedure.

## 2022-04-08 DIAGNOSIS — S52.501A CLOSED FRACTURE OF DISTAL END OF RIGHT RADIUS, UNSPECIFIED FRACTURE MORPHOLOGY, INITIAL ENCOUNTER: Primary | ICD-10-CM

## 2022-04-13 NOTE — PROGRESS NOTES
"  Occupational Therapy Orthotic Note    TIME RECORD    Date:  4/14/2022    Start Time:  2:10 pm  Stop Time:  2:50 pm    PROCEDURES:      UNTIMED  Procedure Min.    -25 min               Total Timed Minutes:  1  Total Timed Units:  1  Total Untimed Units:  1  Charges Billed/# of units:    x 1    Occupational Therapy Orthotic Note    Patient: Ani Montoya  MRN: [id number]  Date of visit: 4/14/2022  Referring Physician:  Freda Ravi PA-C MD ORDERS: OT/CHT Eval &Tx; Appointment 7-9 days s/p surgery on 4/7/2022- needs custom thermoplast wrist splint fabrication and initiation of gentle AROM  Next MD visit: 4/21/22  Primary Diagnosis: S52.501A (ICD-10-CM) - Closed fracture of distal end of right radius, unspecified fracture morphology, initial encounter  Treatment Diagnosis:   Encounter Diagnoses   Name Primary?    Closed fracture of distal end of right radius, unspecified fracture morphology, initial encounter     Pain in right wrist     Decreased range of motion of right wrist      DOS - 4/7/22  PROCEDURE: 1. Open reduction internal fixation of right 2 part intra-articular distal radius fracture.  2. Use of X-ray for fracture reduction.     Subjective:   Pt had moments of "light headed" that passed. Her pain was self-monitored as instructed during HEP return demonstration. 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 She required instructions to avoid tendency to perform passive ROM.    Objective:   Observations: Incision site is clean, no drainage of signs of infection. Steristrips in place. Minimal edema. Moderate amount of bruising. Picture uploaded to Media.  Pt had minimal wrist extension, flexion, supination & pronation AROM. Full digit flexion. Slightly limited thumb extension. No formal measures taken this session.    Patient seen by OT this session for fabrication of orthosis per MD orders. Fabricated and applied Custom Wrist cock-up othosis. 2" stockinette applied for comfort " "and incision protection. Add'l stockinettes provided.     HEP was instructed, illustrated and return demonstrated 1 x 10. Pt required rest breaks due to pain and "light-headedness".  Assessment:     Orthosis with good fit following fabrication. Pt. Able to anya/doff independently.     Fair tolerance for gentle AROM.      Patient Education/Response:   Pt. Instructed to wear continuous, remove for hygiene. She understood discharge orders were to keep the incision site dry.    Patient/caregiver were provided written instructions on orthosis purpose, wear schedule, care and precautions to monitor for increased pain/edema, pressure points, skin breakdown or redness/skin irritation. Patient/caregiver to contact clinic for adjustments as needed.       (see Patient Instructions for documents)    Plans and Goals:     Goal of Orthosis to protect sx site.    Pt to be seen by OT/CHT for Initial Evaluation, specific to MD orders at next visit.    Patient/caregiver to contact clinic for adjustments/ modifications to the orthosis if needed prior to the next visit.    ROSEMARIE Ellis, CHANOT  Occupational Therapist, Certified Hand Therapist      "

## 2022-04-14 ENCOUNTER — TELEPHONE (OUTPATIENT)
Dept: ORTHOPEDICS | Facility: CLINIC | Age: 32
End: 2022-04-14
Payer: COMMERCIAL

## 2022-04-14 ENCOUNTER — PATIENT MESSAGE (OUTPATIENT)
Dept: REHABILITATION | Facility: HOSPITAL | Age: 32
End: 2022-04-14

## 2022-04-14 ENCOUNTER — CLINICAL SUPPORT (OUTPATIENT)
Dept: REHABILITATION | Facility: HOSPITAL | Age: 32
End: 2022-04-14
Payer: COMMERCIAL

## 2022-04-14 DIAGNOSIS — M25.531 PAIN IN RIGHT WRIST: ICD-10-CM

## 2022-04-14 DIAGNOSIS — M25.631 DECREASED RANGE OF MOTION OF RIGHT WRIST: ICD-10-CM

## 2022-04-14 DIAGNOSIS — S52.501A CLOSED FRACTURE OF DISTAL END OF RIGHT RADIUS, UNSPECIFIED FRACTURE MORPHOLOGY, INITIAL ENCOUNTER: ICD-10-CM

## 2022-04-14 PROCEDURE — L3906 WHO W/O JOINTS CF: HCPCS | Mod: PN

## 2022-04-14 PROCEDURE — 97110 THERAPEUTIC EXERCISES: CPT | Mod: PN

## 2022-04-14 NOTE — PATIENT INSTRUCTIONS
CUSTOM  ORTHOSIS (SPLINT) INSTRUCTIONS      A CUSTOM SPLINT HAS BEEN FABRICATED FOR YOUR Right Wrist.    THE PURPOSE OF YOUR SPLINT IS TO:  - To protect your surgery site      PLEASE WEAR YOUR SPLINT/S AS FOLLOWS:      - Wear at all times (24/7) including while sleeping    PERFORM THE HOME EXERCISES IF INSTRUCTED.     BRING SPLINT/S TO EACH THERAPY SESSION SO THAT YOUR OCCUPATIONAL THERAPIST CAN PROVIDE MODIFICATIONS AS NEEDED TO:  -  Improve the fit and/or comfort., Reduce pressure areas or areas of abrasion., Reduce the size of the splint as your swelling decreases., and Repair straps or velcro.    PLEASE WATCH FOR AND BE AWARE OF THE FOLLOWING:  - Signs of infection that may include increased swelling, change in color, increased drainage, increased pain, change in smell. Contact your MD or seek advice from an urgent care MD if after hours, Signs of pressure or abrasion that will cause skin irritation or blistering, Signs of maceration which can be caused by trapped moisture under a splint. Always replace wet sleeves/garments with dry ones. If allowed, remove the splint to allow the skin to dry. Plan ahead to Keep your joint/s in a protected position as needed. You can use a low setting on a hair dryer for drying the skin if no wound is present., and Broken straps or velcro. Use an ace wrap or tape as needed to secure the splint until it can be repaired.      Keep your orthosis away from any heat sources that may cause it to change shape. Do not leave your orthosis in, near or around the following sources:       Hot water       hand dryers       stove       clothes dryer         radiator     pepe car    pepe window sill     baggage stored under an airplane            microwave oven    DO NOT alter the SPLINT yourself. It may no longer fit or be effective if it is damaged.    Please contact your Occupational Therapist/Hand Therapist, Cailin Tracy at [phone] if you need to request a splint check. If  you have specific questions or concerns, you can message her through the [facility] portal at [email or web address].                   [facility], OCCUPATIONAL THERAPY  HOME EXERCISE PROGRAM     PERFORM EXERCISES 1 TO 3 SETS OF 10, 4-6 TIMES PER DAY; ALWAYS PAIN-FREE. DON'T LET THE PICTURES PRESSURE YOU TO PUSH THROUGH PAINFUL RANGES.

## 2022-04-14 NOTE — TELEPHONE ENCOUNTER
Spoke to patient in regards to message. Stated to patient that I did not understand what she meant by the 4-th note. Patient stated that she ddi not send that message. Stated that I will deleat the message. Patient stated thank you. Thanks.

## 2022-04-19 ENCOUNTER — CLINICAL SUPPORT (OUTPATIENT)
Dept: REHABILITATION | Facility: HOSPITAL | Age: 32
End: 2022-04-19
Payer: COMMERCIAL

## 2022-04-19 DIAGNOSIS — M25.531 PAIN IN RIGHT WRIST: Primary | ICD-10-CM

## 2022-04-19 DIAGNOSIS — M25.631 DECREASED RANGE OF MOTION OF RIGHT WRIST: ICD-10-CM

## 2022-04-19 DIAGNOSIS — Z78.9 DECREASED ACTIVITIES OF DAILY LIVING (ADL): ICD-10-CM

## 2022-04-19 PROCEDURE — 97530 THERAPEUTIC ACTIVITIES: CPT | Mod: PN

## 2022-04-19 PROCEDURE — 97110 THERAPEUTIC EXERCISES: CPT | Mod: PN

## 2022-04-19 NOTE — PLAN OF CARE
[facility] Occupational Therapy  Wrist & Hand Initial Evaluation     Date: 4/19/2022  Patient: Ani Montoya  Chart Number: [id number]  Referring Physician: Freda Ravi PA-C  Therapy Diagnosis:   1. Pain in right wrist     2. Decreased range of motion of right wrist         Medical Diagnosis: S52.501A (ICD-10-CM) - Closed fracture of distal end of right radius, unspecified fracture morphology, initial encounter  Physician Orders: MD ORDERS: OT/CHT Eval &Tx; Appointment 7-9 days s/p surgery on 4/7/2022- needs custom thermoplast wrist splint fabrication and initiation of gentle AROM  Evaluation Date: 4/19/2022  Insurance Authorization Period Expiration: through 12/31/22  Plan of Care from 4/19/2022 to 6/24/22   Surgery Date and Procedure: 4/7/22 -   1. Open reduction internal fixation of right 2 part intra-articular distal radius fracture.  2. Use of X-ray for fracture reduction.   Date of Return to MD: 4/21/22    Visit #: 2/60  Time In: 1130  Time Out: 1215  Total Billable Time: 45    Precautions: Standard, Weightbearing She is now 12 days post-op as of 4/19/22.    Subjective     Involved Side: right  Dominant Side: left  Date of Onset: 4/2/22  History of Current Condition: 4/5/22  Patient is a 32 year old LHD female who presented to the ED on 04/02/22 with right wrist pain after FOOSH the day prior.   Patient was treated in a sugar tong splint  Sx 4/7/22.  Imaging: RADS:  transverse type fracture involving the distal radial meta epiphyseal junction with slight impaction and minimal displacement  Previous Therapy: splinting from [facility] on 4/14/22    Patient's Goals for Therapy: Ani desires to restore pain-free function of the  Left UE for participating in all customary & necessary I/ADLs and work such as bowling using an 8 lbs bowling ball.    Pain:  Functional Pain Scale Rating 0-10:   5/10 on average  4/10 at best  10/10 at worst  Location: radial forearm  Description: aching,  soreness  Aggravating Factors: movement, overuse  Easing Factors: rest, medication (patient prefers to avoid strong meds at work because of how they make her feel).    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  Is required to provide chest compressions, transfer patients between gurneys/beds/recliners, administering IV medications, sustained keyboarding.    Functional Limitations/Social History:    Previous functional status includes: Independent with all ADLs.     Current Level of Function    Home/Living environment : lives alone   Limitation of Functional Status as follows:  ADLs/IADLs:     - Feeding: unable to cut food using knife    - Bathing: independent    - Dressing/Grooming: fasteners of all types are difficult    - Toileting: difficulty raising and lowering clothing    - Sleep: pain and concern for hand result in frequent waking at night.    - Driving: automatic transmission. One hand driving.   Leisure: loves bowling, bike riding. Unable to do both during recovery.     CMS Impairment/Limitation/Restriction for FOTO Hand Survey     Therapist reviewed FOTO scores for Ani on 4/19/2022.   FOTO documents entered into [employer] - see Media section.     Limitation Score: 62.5        Past Medical History/Physical Systems Review:   Ani Montoya  has a past medical history of Hypopituitarism, Osteoporosis, and Primary amenorrhea.    Ani Montoya  has a past surgical history that includes breast aug (Bilateral, 2012); Open reduction and internal fixation (ORIF) of fracture of tibial plateau (Right, 8/18/2020); and Open reduction and internal fixation (ORIF) of injury of wrist (Right, 4/7/2022).    Ani has a current medication list which includes the following prescription(s): amitriptyline, calcium-vitamin d3, cholecalciferol (vitamin d3), cholecalciferol (vitamin d3), ergocalciferol, estradiol, ibuprofen, medroxyprogesterone, naproxen,  oxycodone-acetaminophen, progesterone, promethazine, and norditropin flexpro, and the following Facility-Administered Medications: fentanyl and midazolam.    Review of patient's allergies indicates:  No Known Allergies       Objective     Mental status: alert, interactive, oriented x3    Observation:   Steri strips in place, incision healing, bruising throughout volar surface of forearm.    Edema: Circumferential measurements: In centimters     Right/Left    IE-   IF P1  5.5   SF P1   4.8   Thumb P1 5.7   DPC 17.5/17   Wrist 15.5/13.5         Digit and Thumb AROM (in centimeters)  Measured from tip to DPC Right   DATE IE-   IF  .5cm   Thumb 0          Wrist AROM   Right/Left   DATE IE-   EXT 50/88   FLX 35/90   RD 21/55   UD  39/57   GOOD 230/290      Forearm AROM   Right/Left   DATE IE-   Supination 15   Pronation 85        Treatment     Ani performed therapeutic exercises for 30 minutes including:  -return demonstration of home exercise program revealed need for reinforcement of techniques for wrist range of motion.    -above measurements created today.    Ani participated in dynamic functional therapeutic activities to improve functional performance for 15 minutes, including:  -fitting and training on edema glove size xs.  Good return demonstration of don/doff.    Ani participated in an in-depth and concurrent discussion of evaluation findings and specific home care, including:  - education related to the tissue involvement as evidenced by MD diagnosis, positive response during provative movements and/or special testing.  - the anatomy and pathophysiology of diagnosis.    Home Exercise Program and Home Care Resources/Education:  Issued HEP (see patient instructions in EMR) and educated on modality use for pain management . Exercises were reviewed and Ani was able to demonstrate them prior to the end of the session.   Pt received a written copy of exercises to perform at home. Ani demonstrated good   understanding of the education provided.  Pt was advised to perform these exercises free of pain, and to stop performing them if pain occurs.    Patient/Family Education: role of OT, goals for OT, scheduling/cancellations - pt verbalized understanding. Discussed insurance limitations with patient.    Assessment     Ani Montoya is a 32 y.o. female presents with limitations as described in problem list. Patient can benefit from Occupational Therapy services for Iontophoresis, ultrasound, moist heat, therapeutic exercises, home exercise program provied with written instructions, ice and strengthening and orthotics, if deemed necessary . The following goals were discussed with the patient and she is in agreement with them as to be addressed in the treatment plan.    The patient's rehab potential is Excellent.     Anticipated barriers to occupational therapy: osteoporosis  Pt has no cultural, educational or language barriers to learning provided.    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 Ani Montoya has difficulty with  ADLs and IADLs as listed previously, which  affecting his/her daily functional abilities.      Comorbidities:    has a past medical history of Hypopituitarism, Osteoporosis, and Primary amenorrhea.    Medical and Therapy History Review:   Expanded               Performance Deficits    Physical:  Joint Mobility  Muscle Power/Strength  Muscle Endurance  Skin Integrity/Scar Formation  Edema   Strength  Pinch Strength  Fine Motor Coordination  Pain    Cognitive:  Safety Awareness/Insight to Disability    Psychosocial:    Habits  Routines  Rituals  Family Support     Clinical Decision Making:  moderate    Assessment Process:  Detailed Assessments    Modification/Need for Assistance:  Minimal-Moderate  Modifications/Assistance    Intervention Selection:  Several Treatment Options       moderate  Based on PMHX, co morbidities , data from assessments and functional level of assistance required with task and clinical presentation directly impacting function.         Goals:   ( LTG's (6-8 weeks):  1) Increase R wrist GOOD to >90% of the L wrist to increase functional hand use for weight bearing and reaching tasks.  2) R  strength  to be > 70 % of the unaffected side.  3) R pinch strength to be >60% of the unaffected side.  4) Decrease complaints of pain to 0 out of 10 at worst to increase functional hand use and UE support functions for moderate to heavy ADL/work/leisure activities.  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    STG's (4-5 weeks)  1) Patient to be IND with HEP and modalities for pain/edema managment.  2) Pt to tolerate advancing PREs and flexibility activities for weight bearing positions with self-graded intensity and effective symptom monitoring.   3) Increase wrist and forearm GOOD by 15 degrees to increase functional hand use and support function positions for ADLs/work/leisure activities.  4) Digit and Thumb GOOD to be WNL as compared to the unaffected side for maximizing  and fine motor skills for reactivation the hand for light ADLs.  5) Decrease edema by 1 cm for evidencing soft tissue healing and effective edema mgmt.     Plan     Pt to be treated by Occupational Therapy 2 times per week for 10 weeks during the certification period from 4/19/2022 to 6/24/22 to achieve the established goals.     Treatment to include: Paraffin, Fluidotherapy, Manual therapy/joint mobilizations, Modalities for  pain management, US 3 mhz, Therapeutic exercises/activities., Iontophoresis with 2.0 cc Dexamethasone, Strengthening, Orthotic Fabrication/Fit/Training, Edema Control, Scar Management, Joint Protection and Energy Conservation, as well as any other treatments deemed necessary based on the patient's needs or progress.     CARLOS WASHBURN, OT  Occupational Therapist    I certify the need for these services furnished under this plan of treatment and while under my care    ____________________________________  Physician/Referring Practitioner    _______________  Date of Signature

## 2022-04-19 NOTE — PROGRESS NOTES
[facility] Occupational Therapy  Wrist & Hand Initial Evaluation     Date: 4/19/2022  Patient: Ani Montoya  Chart Number: [id number]  Referring Physician: Freda Ravi PA-C  Therapy Diagnosis:   1. Pain in right wrist     2. Decreased range of motion of right wrist         Medical Diagnosis: S52.501A (ICD-10-CM) - Closed fracture of distal end of right radius, unspecified fracture morphology, initial encounter  Physician Orders: MD ORDERS: OT/CHT Eval &Tx; Appointment 7-9 days s/p surgery on 4/7/2022- needs custom thermoplast wrist splint fabrication and initiation of gentle AROM  Evaluation Date: 4/19/2022  Insurance Authorization Period Expiration: through 12/31/22  Plan of Care from 4/19/2022 to 6/24/22   Surgery Date and Procedure: 4/7/22 -   1. Open reduction internal fixation of right 2 part intra-articular distal radius fracture.  2. Use of X-ray for fracture reduction.   Date of Return to MD: 4/21/22    Visit #: 2/60  Time In: 1130  Time Out: 1215  Total Billable Time: 45    Precautions: Standard, Weightbearing She is now 12 days post-op as of 4/19/22.    Subjective     Involved Side: right  Dominant Side: left  Date of Onset: 4/2/22  History of Current Condition: 4/5/22  Patient is a 32 year old LHD female who presented to the ED on 04/02/22 with right wrist pain after FOOSH the day prior.   Patient was treated in a sugar tong splint  Sx 4/7/22.  Imaging: RADS:  transverse type fracture involving the distal radial meta epiphyseal junction with slight impaction and minimal displacement  Previous Therapy: splinting from [facility] on 4/14/22    Patient's Goals for Therapy: Ani desires to restore pain-free function of the  Left UE for participating in all customary & necessary I/ADLs and work such as bowling using an 8 lbs bowling ball.    Pain:  Functional Pain Scale Rating 0-10:   5/10 on average  4/10 at best  10/10 at worst  Location: radial forearm  Description: aching,  soreness  Aggravating Factors: movement, overuse  Easing Factors: rest, medication (patient prefers to avoid strong meds at work because of how they make her feel).    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  Is required to provide chest compressions, transfer patients between gurneys/beds/recliners, administering IV medications, sustained keyboarding.    Functional Limitations/Social History:    Previous functional status includes: Independent with all ADLs.     Current Level of Function    Home/Living environment : lives alone   Limitation of Functional Status as follows:  ADLs/IADLs:     - Feeding: unable to cut food using knife    - Bathing: independent    - Dressing/Grooming: fasteners of all types are difficult    - Toileting: difficulty raising and lowering clothing    - Sleep: pain and concern for hand result in frequent waking at night.    - Driving: automatic transmission. One hand driving.   Leisure: loves bowling, bike riding. Unable to do both during recovery.     CMS Impairment/Limitation/Restriction for FOTO Hand Survey     Therapist reviewed FOTO scores for Ani on 4/19/2022.   FOTO documents entered into [employer] - see Media section.     Limitation Score: 62.5        Past Medical History/Physical Systems Review:   Ani Montoya  has a past medical history of Hypopituitarism, Osteoporosis, and Primary amenorrhea.    Ani Montoya  has a past surgical history that includes breast aug (Bilateral, 2012); Open reduction and internal fixation (ORIF) of fracture of tibial plateau (Right, 8/18/2020); and Open reduction and internal fixation (ORIF) of injury of wrist (Right, 4/7/2022).    Ani has a current medication list which includes the following prescription(s): amitriptyline, calcium-vitamin d3, cholecalciferol (vitamin d3), cholecalciferol (vitamin d3), ergocalciferol, estradiol, ibuprofen, medroxyprogesterone, naproxen,  oxycodone-acetaminophen, progesterone, promethazine, and norditropin flexpro, and the following Facility-Administered Medications: fentanyl and midazolam.    Review of patient's allergies indicates:  No Known Allergies       Objective     Mental status: alert, interactive, oriented x3    Observation:   Steri strips in place, incision healing, bruising throughout volar surface of forearm.    Edema: Circumferential measurements: In centimters     Right/Left    IE-   IF P1  5.5   SF P1   4.8   Thumb P1 5.7   DPC 17.5/17   Wrist 15.5/13.5         Digit and Thumb AROM (in centimeters)  Measured from tip to DPC Right   DATE IE-   IF  .5cm   Thumb 0          Wrist AROM   Right/Left   DATE IE-   EXT 50/88   FLX 35/90   RD 21/55   UD  39/57   GOOD 230/290      Forearm AROM   Right/Left   DATE IE-   Supination 15   Pronation 85        Treatment     Ani performed therapeutic exercises for 30 minutes including:  -return demonstration of home exercise program revealed need for reinforcement of techniques for wrist range of motion.    -above measurements created today.    Ani participated in dynamic functional therapeutic activities to improve functional performance for 15 minutes, including:  -fitting and training on edema glove size xs.  Good return demonstration of don/doff.    Ani participated in an in-depth and concurrent discussion of evaluation findings and specific home care, including:  - education related to the tissue involvement as evidenced by MD diagnosis, positive response during provative movements and/or special testing.  - the anatomy and pathophysiology of diagnosis.    Home Exercise Program and Home Care Resources/Education:  Issued HEP (see patient instructions in EMR) and educated on modality use for pain management . Exercises were reviewed and Ani was able to demonstrate them prior to the end of the session.   Pt received a written copy of exercises to perform at home. Ani demonstrated good   understanding of the education provided.  Pt was advised to perform these exercises free of pain, and to stop performing them if pain occurs.    Patient/Family Education: role of OT, goals for OT, scheduling/cancellations - pt verbalized understanding. Discussed insurance limitations with patient.    Assessment     Ani Montoya is a 32 y.o. female presents with limitations as described in problem list. Patient can benefit from Occupational Therapy services for Iontophoresis, ultrasound, moist heat, therapeutic exercises, home exercise program provied with written instructions, ice and strengthening and orthotics, if deemed necessary . The following goals were discussed with the patient and she is in agreement with them as to be addressed in the treatment plan.    The patient's rehab potential is Excellent.     Anticipated barriers to occupational therapy: osteoporosis  Pt has no cultural, educational or language barriers to learning provided.    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 Ani Montoya has difficulty with  ADLs and IADLs as listed previously, which  affecting his/her daily functional abilities.      Comorbidities:    has a past medical history of Hypopituitarism, Osteoporosis, and Primary amenorrhea.    Medical and Therapy History Review:   Expanded               Performance Deficits    Physical:  Joint Mobility  Muscle Power/Strength  Muscle Endurance  Skin Integrity/Scar Formation  Edema   Strength  Pinch Strength  Fine Motor Coordination  Pain    Cognitive:  Safety Awareness/Insight to Disability    Psychosocial:    Habits  Routines  Rituals  Family Support     Clinical Decision Making:  moderate    Assessment Process:  Detailed Assessments    Modification/Need for Assistance:  Minimal-Moderate  Modifications/Assistance    Intervention Selection:  Several Treatment Options       moderate  Based on PMHX, co morbidities , data from assessments and functional level of assistance required with task and clinical presentation directly impacting function.         Goals:   ( LTG's (6-8 weeks):  1) Increase R wrist GOOD to >90% of the L wrist to increase functional hand use for weight bearing and reaching tasks.  2) R  strength  to be > 70 % of the unaffected side.  3) R pinch strength to be >60% of the unaffected side.  4) Decrease complaints of pain to 0 out of 10 at worst to increase functional hand use and UE support functions for moderate to heavy ADL/work/leisure activities.  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    STG's (4-5 weeks)  1) Patient to be IND with HEP and modalities for pain/edema managment.  2) Pt to tolerate advancing PREs and flexibility activities for weight bearing positions with self-graded intensity and effective symptom monitoring.   3) Increase wrist and forearm GOOD by 15 degrees to increase functional hand use and support function positions for ADLs/work/leisure activities.  4) Digit and Thumb GOOD to be WNL as compared to the unaffected side for maximizing  and fine motor skills for reactivation the hand for light ADLs.  5) Decrease edema by 1 cm for evidencing soft tissue healing and effective edema mgmt.     Plan     Pt to be treated by Occupational Therapy 2 times per week for 10 weeks during the certification period from 4/19/2022 to 6/24/22 to achieve the established goals.     Treatment to include: Paraffin, Fluidotherapy, Manual therapy/joint mobilizations, Modalities for  pain management, US 3 mhz, Therapeutic exercises/activities., Iontophoresis with 2.0 cc Dexamethasone, Strengthening, Orthotic Fabrication/Fit/Training, Edema Control, Scar Management, Joint Protection and Energy Conservation, as well as any other treatments deemed necessary based on the patient's needs or progress.     CARLOS WASHBURN, OT  Occupational Therapist    I certify the need for these services furnished under this plan of treatment and while under my care    ____________________________________  Physician/Referring Practitioner    _______________  Date of Signature

## 2022-04-21 ENCOUNTER — OFFICE VISIT (OUTPATIENT)
Dept: ORTHOPEDICS | Facility: CLINIC | Age: 32
End: 2022-04-21
Payer: COMMERCIAL

## 2022-04-21 ENCOUNTER — HOSPITAL ENCOUNTER (OUTPATIENT)
Dept: RADIOLOGY | Facility: HOSPITAL | Age: 32
Discharge: HOME | End: 2022-04-21
Attending: PHYSICIAN ASSISTANT
Payer: COMMERCIAL

## 2022-04-21 VITALS — WEIGHT: 109.56 LBS | HEIGHT: 59 IN | BODY MASS INDEX: 22.09 KG/M2

## 2022-04-21 DIAGNOSIS — M25.531 WRIST PAIN, RIGHT: ICD-10-CM

## 2022-04-21 DIAGNOSIS — S52.551D OTHER CLOSED EXTRA-ARTICULAR FRACTURE OF DISTAL END OF RIGHT RADIUS WITH ROUTINE HEALING, SUBSEQUENT ENCOUNTER: Primary | ICD-10-CM

## 2022-04-21 DIAGNOSIS — R52 PAIN: ICD-10-CM

## 2022-04-21 PROCEDURE — 73110 XR WRIST COMPLETE 3 VIEWS RIGHT: ICD-10-PCS | Mod: 26,RT,, | Performed by: RADIOLOGY

## 2022-04-21 PROCEDURE — 1160F PR REVIEW ALL MEDS BY PRESCRIBER/CLIN PHARMACIST DOCUMENTED: ICD-10-PCS | Mod: CPTII,S$GLB,, | Performed by: PHYSICIAN ASSISTANT

## 2022-04-21 PROCEDURE — 1160F RVW MEDS BY RX/DR IN RCRD: CPT | Mod: CPTII,S$GLB,, | Performed by: PHYSICIAN ASSISTANT

## 2022-04-21 PROCEDURE — 99024 POSTOP FOLLOW-UP VISIT: CPT | Mod: S$GLB,,, | Performed by: PHYSICIAN ASSISTANT

## 2022-04-21 PROCEDURE — 1159F PR MEDICATION LIST DOCUMENTED IN MEDICAL RECORD: ICD-10-PCS | Mod: CPTII,S$GLB,, | Performed by: PHYSICIAN ASSISTANT

## 2022-04-21 PROCEDURE — 73110 X-RAY EXAM OF WRIST: CPT | Mod: TC,RT

## 2022-04-21 PROCEDURE — 1159F MED LIST DOCD IN RCRD: CPT | Mod: CPTII,S$GLB,, | Performed by: PHYSICIAN ASSISTANT

## 2022-04-21 PROCEDURE — 3008F BODY MASS INDEX DOCD: CPT | Mod: CPTII,S$GLB,, | Performed by: PHYSICIAN ASSISTANT

## 2022-04-21 PROCEDURE — 3008F PR BODY MASS INDEX (BMI) DOCUMENTED: ICD-10-PCS | Mod: CPTII,S$GLB,, | Performed by: PHYSICIAN ASSISTANT

## 2022-04-21 PROCEDURE — 73110 X-RAY EXAM OF WRIST: CPT | Mod: 26,RT,, | Performed by: RADIOLOGY

## 2022-04-21 PROCEDURE — 99999 PR PBB SHADOW E&M-EST. PATIENT-LVL III: CPT | Mod: PBBFAC,,, | Performed by: PHYSICIAN ASSISTANT

## 2022-04-21 PROCEDURE — 99024 PR POST-OP FOLLOW-UP VISIT: ICD-10-PCS | Mod: S$GLB,,, | Performed by: PHYSICIAN ASSISTANT

## 2022-04-21 PROCEDURE — 99999 PR PBB SHADOW E&M-EST. PATIENT-LVL III: ICD-10-PCS | Mod: PBBFAC,,, | Performed by: PHYSICIAN ASSISTANT

## 2022-04-21 NOTE — PROGRESS NOTES
First Postoperative Visit    History of Present Illness:   Ani Montoya is s/p ORIF right distal radius fracture  who comes to the office for 14 days post op evaluation (DOS 04/07/2022). Her pain is well controlled and the patient is not taking narcotic pain medications. The patient is wearing the custom splint from therapy.  She has been in OT. No numbness or tingling.  She reports appropriate postoperative soreness and her pain is improving.      Physical Examination:  She is alert, awake and oriented to time, place and person. She does not appear to be in any distress, and denies any constitutional symptoms. Examination of the right wrist demonstrates healing incision with no erythema or swelling.  FROM in fingers.  Sensation intact.    Assessment/Plan:  1. 2 weeks s/p ORIF right distal radius fracture  2. Remain NWB and continue splint wear.   3. Continue OT  4. Follow up in 3 weeks with Dr. Adhikari    All questions were answered in detail. The patient is in full agreement with the treatment plan and will proceed accordingly.

## 2022-04-22 ENCOUNTER — TELEPHONE (OUTPATIENT)
Dept: REHABILITATION | Facility: HOSPITAL | Age: 32
End: 2022-04-22
Payer: COMMERCIAL

## 2022-04-22 NOTE — TELEPHONE ENCOUNTER
Missed first appointment.  Left voicemail. First missed visit. Next visit scheduled Monday 4/25/22.

## 2022-04-25 ENCOUNTER — CLINICAL SUPPORT (OUTPATIENT)
Dept: REHABILITATION | Facility: HOSPITAL | Age: 32
End: 2022-04-25
Payer: COMMERCIAL

## 2022-04-25 DIAGNOSIS — Z78.9 DECREASED ACTIVITIES OF DAILY LIVING (ADL): ICD-10-CM

## 2022-04-25 DIAGNOSIS — M25.531 PAIN IN RIGHT WRIST: Primary | ICD-10-CM

## 2022-04-25 DIAGNOSIS — M25.631 DECREASED RANGE OF MOTION OF RIGHT WRIST: ICD-10-CM

## 2022-04-25 PROCEDURE — 97018 PARAFFIN BATH THERAPY: CPT | Mod: PN

## 2022-04-25 PROCEDURE — 97140 MANUAL THERAPY 1/> REGIONS: CPT | Mod: PN

## 2022-04-25 PROCEDURE — 97110 THERAPEUTIC EXERCISES: CPT | Mod: PN

## 2022-04-25 NOTE — PROGRESS NOTES
Occupational Therapy Daily Treatment Note     Name: Ani Montoya  Clinic Number: [id number]    Therapy Diagnosis:   Encounter Diagnoses   Name Primary?    Pain in right wrist Yes    Decreased range of motion of right wrist     Decreased activities of daily living (ADL)      Physician: Freda Ravi PA-C    Visit Date: 4/25/2022  Medical Diagnosis: S52.501A (ICD-10-CM) - Closed fracture of distal end of right radius, unspecified fracture morphology, initial encounter  Physician Orders: MD ORDERS: OT/CHT Eval &Tx; Appointment 7-9 days s/p surgery on 4/7/2022- needs custom thermoplast wrist splint fabrication and initiation of gentle AROM  Evaluation Date: 4/19/2022  Insurance Authorization Period Expiration: through 12/31/22  Plan of Care from 4/19/2022 to 6/24/22   Surgery Date and Procedure: 4/7/22 -   1. Open reduction internal fixation of right 2 part intra-articular distal radius fracture.  2. Use of X-ray for fracture reduction.   Date of Return to MD: 4/21/22    Visit # / Visits authorized: 2 / 60  Time In:0830  Time Out: 0915  Total Billable Time: 45 minutes    Precautions: Standard, Weightbearing She is now 18 days post-op as of 4/25/22.  Date of Onset: 4/2/22    Subjective     Pt reports: pt is grateful to have steri-strips removed. Splint is working well.  she was compliant with home exercise program given last session.   Response to previous treatment:No adverse reactions.  Functional change: increased flexion    Pain: 3/10  Location: right wrist, incision site.      Objective   Edema: Circumferential measurements: In centimters       Right/Left     IE-   IF P1  5.5   SF P1   4.8   Thumb P1 5.7   DPC 17.5/17   Wrist 15.5/13.5            Digit and Thumb AROM (in centimeters)  Measured from tip to DPC Right   DATE IE-   IF  .5cm   Thumb 0            Wrist AROM    Right/Left Right   DATE IE- 4/25   EXT 50/88 50   FLX 35/90 45   RD 21/55    UD  39/57    GOOD 230/290       Forearm AROM    Right/Left    DATE IE-   Supination 15   Pronation 85      Ani received the following supervised modalities after being cleared for contradictions for 10 minutes:   -Paraffin to right hand for 10 min, pre-tx to decrease pain & increase tissue extensibility    Ani received the following manual therapy techniques for 25 minutes:   -scar massage was very sensitive at incision site.  -training and application of silicone dressing over incision site with good return demonstration.  -Edema mgmt w/ lymphatic drainage technique;  gentle STM and scar mgmt to left wrist, using hand techniques to increase blood flow/circulation, improve soft tissue extensability and decrease pain.    Ani received therapeutic exercises for 10 minutes including:  -wrist flexion/extension with fist and with open hand, 2 min each  -Pronation/supination with fist/open hand, 2 min each    Home Exercises and Education Provided     Education provided:   - Progress towards goals     Written Home Exercises Provided: Patient instructed to cont prior HEP.  Exercises were reviewed and Ani was able to demonstrate them prior to the end of the session.  Ani demonstrated good  understanding of the HEP provided.   .   See EMR under Patient Instructions for exercises provided prior visit.        Assessment   Good session today. Pt's finger range of motion is still very good. Provided patient with scar pad and new measures for wrist range of motion which is now improved in flexion.  Pt would continue to benefit from skilled OT.      Ani is progressing well towards her goals and there are no updates to goals at this time. Pt prognosis is Excellent.     Pt will continue to benefit from skilled outpatient occupational therapy to address the deficits listed in the problem list on initial evaluation provide pt/family education and to maximize pt's level of independence in the home and community environment.     Anticipated barriers to occupational therapy:  None    Pt's spiritual, cultural and educational needs considered and pt agreeable to plan of care and goals.    Goals:  ( LTG's (6-8 weeks):  1) Increase R wrist GOOD to >90% of the L wrist to increase functional hand use for weight bearing and reaching tasks.  2) R  strength  to be > 70 % of the unaffected side.  3) R pinch strength to be >60% of the unaffected side.  4) Decrease complaints of pain to 0 out of 10 at worst to increase functional hand use and UE support functions for moderate to heavy ADL/work/leisure activities.  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     STG's (4-5 weeks)  1) Patient to be IND with HEP and modalities for pain/edema managment.  2) Pt to tolerate advancing PREs and flexibility activities for weight bearing positions with self-graded intensity and effective symptom monitoring.   3) Increase wrist and forearm GOOD by 15 degrees to increase functional hand use and support function positions for ADLs/work/leisure activities.  4) Digit and Thumb GOOD to be WNL as compared to the unaffected side for maximizing  and fine motor skills for reactivation the hand for light ADLs.  5) Decrease edema by 1 cm for evidencing soft tissue healing and effective edema mgmt.     Plan   Updates/Grading for next session: continue gentle active range of motion, check for sensitivity at incision site.      CARLOS WASHBURN, OT

## 2022-05-02 ENCOUNTER — CLINICAL SUPPORT (OUTPATIENT)
Dept: REHABILITATION | Facility: HOSPITAL | Age: 32
End: 2022-05-02
Payer: COMMERCIAL

## 2022-05-02 DIAGNOSIS — M25.531 PAIN IN RIGHT WRIST: Primary | ICD-10-CM

## 2022-05-02 DIAGNOSIS — M25.631 DECREASED RANGE OF MOTION OF RIGHT WRIST: ICD-10-CM

## 2022-05-02 DIAGNOSIS — Z78.9 DECREASED ACTIVITIES OF DAILY LIVING (ADL): ICD-10-CM

## 2022-05-02 PROCEDURE — 97110 THERAPEUTIC EXERCISES: CPT | Mod: PN

## 2022-05-02 PROCEDURE — 97140 MANUAL THERAPY 1/> REGIONS: CPT | Mod: PN

## 2022-05-02 NOTE — PROGRESS NOTES
Occupational Therapy Daily Treatment Note     Name: Ani Montoya  Clinic Number: [id number]    Therapy Diagnosis:   Encounter Diagnoses   Name Primary?    Pain in right wrist Yes    Decreased range of motion of right wrist     Decreased activities of daily living (ADL)      Physician: Freda Ravi PA-C    Visit Date: 5/2/2022  Medical Diagnosis: S52.501A (ICD-10-CM) - Closed fracture of distal end of right radius, unspecified fracture morphology, initial encounter  Physician Orders: MD ORDERS: OT/CHT Eval &Tx; Appointment 7-9 days s/p surgery on 4/7/2022- needs custom thermoplast wrist splint fabrication and initiation of gentle AROM  Evaluation Date: 4/19/2022  Insurance Authorization Period Expiration: through 12/31/22  Plan of Care from 4/19/2022 to 6/24/22   Surgery Date and Procedure: 4/7/22 -   1. Open reduction internal fixation of right 2 part intra-articular distal radius fracture.  2. Use of X-ray for fracture reduction.   Date of Return to MD: 4/21/22    Visit # / Visits authorized: 3 / 60  Time In:1045  Time Out: 1131  Total Billable Time: 46 minutes    Precautions: Standard, Weightbearing She is now 3 weeks post-op as of 5/2/22.  Date of Onset: 4/2/22    Subjective     Pt reports: She missed last visit due to a volunteer opportunity.  she was compliant with home exercise program given last session.   Response to previous treatment: No adverse reactions.  Functional change: increased flexion    Pain: 2/10  Location: right wrist, incision site.    Pronation and supination results in 5/10 pain.    Objective   Edema: Circumferential measurements: In centimters       Right Right/Left     IE- 5/2/22   IF P1  5.5 5.4/5.5   SF P1   4.8 4.6/4.9   Thumb P1 5.7 5.5/5.5   DPC 17.5/17 16.8/   Wrist 15.5/13.5 15.0/             Digit and Thumb AROM (in centimeters)  Measured from tip to DPC Right Right   DATE IE-    IF  .5cm 0cm   Thumb 0 0cm             Wrist AROM    Right/Left Right Right   DATE IE- 4/25  5/2   EXT 50/88 50 56   FLX 35/90 45 58   RD 21/55  9   UD  39/57  48   GOOD 145/290  171 (+26)      Forearm AROM    Right/Left Right   DATE IE- 5/2/22   Supination 15 48   Pronation 85 77      Ani received the following supervised modalities after being cleared for contradictions for 10 minutes:   -defer- Paraffin to right hand for 10 min, pre-tx to decrease pain & increase tissue extensibility  Moist hot pack for 10 minutes to improve tissue extensibility.    Ani received the following manual therapy techniques for 8 minutes:   -defer - scar massage was very sensitive around site.  -defer - training and application of silicone dressing over incision site with good return demonstration.  -Edema mgmt w/ lymphatic drainage technique;  gentle STM and scar mgmt to left wrist, using hand techniques to increase blood flow/circulation, improve soft tissue extensability and decrease pain.    Ani received therapeutic exercises for 28 minutes including:  -wrist flexion/extension with fist and with open hand, 2 min each  -Pronation/supination with fist/open hand, 2 min each    Home Exercises and Education Provided     Education provided:   - Progress towards goals     Written Home Exercises Provided: Patient instructed to cont prior HEP.  Exercises were reviewed and Ani was able to demonstrate them prior to the end of the session.  Ani demonstrated good  understanding of the HEP provided.   .   See EMR under Patient Instructions for exercises provided prior visit.        Assessment   Good session today. Her ranges of motion continue to improve and pain remains under control. Provided patient with tegaderm over incision today.      Pt would continue to benefit from skilled OT.      Ani is progressing well towards her goals and there are no updates to goals at this time. Pt prognosis is Excellent.     Pt will continue to benefit from skilled outpatient occupational therapy to address the deficits listed in the  problem list on initial evaluation provide pt/family education and to maximize pt's level of independence in the home and community environment.     Anticipated barriers to occupational therapy: None    Pt's spiritual, cultural and educational needs considered and pt agreeable to plan of care and goals.    Goals:  ( LTG's (6-8 weeks):  1) Increase R wrist GOOD to >90% of the L wrist to increase functional hand use for weight bearing and reaching tasks.  2) R  strength  to be > 70 % of the unaffected side.  3) R pinch strength to be >60% of the unaffected side.  4) Decrease complaints of pain to 0 out of 10 at worst to increase functional hand use and UE support functions for moderate to heavy ADL/work/leisure activities.  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     STG's (4-5 weeks)  1) Patient to be IND with HEP and modalities for pain/edema managment.  2) Pt to tolerate advancing PREs and flexibility activities for weight bearing positions with self-graded intensity and effective symptom monitoring.   3) Increase wrist and forearm GOOD by 15 degrees to increase functional hand use and support function positions for ADLs/work/leisure activities.  4) Digit and Thumb GOOD to be WNL as compared to the unaffected side for maximizing  and fine motor skills for reactivation the hand for light ADLs.  5) Decrease edema by 1 cm for evidencing soft tissue healing and effective edema mgmt.     Plan   Updates/Grading for next session: continue gentle active range of motion, begin scar massage if wound site is appropriate, check for sensitivity at incision site.      CARLOS WASHBURN, OT

## 2022-05-06 ENCOUNTER — TELEPHONE (OUTPATIENT)
Dept: REHABILITATION | Facility: HOSPITAL | Age: 32
End: 2022-05-06
Payer: COMMERCIAL

## 2022-05-06 NOTE — TELEPHONE ENCOUNTER
Patient reported oversleeping. She has missed 2 visits in as many weeks. Informed her of next visit on 10th of May.

## 2022-05-09 ENCOUNTER — CLINICAL SUPPORT (OUTPATIENT)
Dept: REHABILITATION | Facility: HOSPITAL | Age: 32
End: 2022-05-09
Payer: COMMERCIAL

## 2022-05-09 DIAGNOSIS — M25.631 DECREASED RANGE OF MOTION OF RIGHT WRIST: ICD-10-CM

## 2022-05-09 DIAGNOSIS — Z78.9 DECREASED ACTIVITIES OF DAILY LIVING (ADL): ICD-10-CM

## 2022-05-09 DIAGNOSIS — M25.531 PAIN IN RIGHT WRIST: Primary | ICD-10-CM

## 2022-05-09 PROCEDURE — 97140 MANUAL THERAPY 1/> REGIONS: CPT | Mod: PN

## 2022-05-09 PROCEDURE — 97018 PARAFFIN BATH THERAPY: CPT | Mod: PN

## 2022-05-09 PROCEDURE — 97110 THERAPEUTIC EXERCISES: CPT | Mod: PN

## 2022-05-09 NOTE — PROGRESS NOTES
Occupational Therapy Daily Treatment Note     Name: Ani Montoya  Clinic Number: [id number]    Therapy Diagnosis:   Encounter Diagnoses   Name Primary?    Pain in right wrist Yes    Decreased range of motion of right wrist     Decreased activities of daily living (ADL)      Physician: Freda Ravi PA-C    Visit Date: 5/9/2022  Medical Diagnosis: S52.501A (ICD-10-CM) - Closed fracture of distal end of right radius, unspecified fracture morphology, initial encounter  Physician Orders: MD ORDERS: OT/CHT Eval &Tx; Appointment 7-9 days s/p surgery on 4/7/2022- needs custom thermoplast wrist splint fabrication and initiation of gentle AROM  Evaluation Date: 4/19/2022  Insurance Authorization Period Expiration: through 12/31/22  Plan of Care from 4/19/2022 to 6/24/22   Surgery Date and Procedure: 4/7/22 -   1. Open reduction internal fixation of right 2 part intra-articular distal radius fracture.  2. Use of X-ray for fracture reduction.   Date of Return to MD: 4/21/22    Visit # / Visits authorized: 4 / 60  Time In:1045  Time Out: 1131  Total Billable Time: 46 minutes    Precautions: Standard, Weightbearing She is now 4 weeks post-op as of 5/9/22.  Date of Onset: 4/2/22    Subjective     Pt reports: She notices increased soreness on radial aspect of arm from regular use.  she was compliant with home exercise program given last session.   Response to previous treatment: No adverse reactions.  Functional change: increased flexion    Pain: 1/10  Location: right wrist, incision site.    Pronation and supination results in 4/10 pain.    Objective   Edema: Circumferential measurements: In centimters       Right Right/Left     IE- 5/2/22   IF P1  5.5 5.4/5.5   SF P1   4.8 4.6/4.9   Thumb P1 5.7 5.5/5.5   DPC 17.5/17 16.8/   Wrist 15.5/13.5 15.0/             Digit and Thumb AROM (in centimeters)  Measured from tip to DPC Right Right   DATE IE-    IF  .5cm 0cm   Thumb 0 0cm             Wrist AROM    Right/Left Right  Right   DATE IE- 4/25 5/2   EXT 50/88 50 56   FLX 35/90 45 58   RD 21/55  9   UD  39/57  48   GOOD 145/290  171 (+26)      Forearm AROM    Right/Left Right   DATE IE- 5/2/22   Supination 15 48   Pronation 85 77      Ani received the following supervised modalities after being cleared for contradictions for 10 minutes:   Paraffin to right hand for 10 min, pre-tx to decrease pain & increase tissue extensibility    Ani received the following manual therapy techniques for 8 minutes:   -scar massage was very sensitive around site.  -training and application of kinesiotape at distal forearm for desensitization and improving wrist/forearm pain.  -Edema mgmt w/ lymphatic drainage technique;  gentle STM and scar mgmt to left wrist, using hand techniques to increase blood flow/circulation, improve soft tissue extensability and decrease pain.    Ani received therapeutic exercises for 28 minutes including:  -wrist flexion/extension with fist and with open hand, 2 min each  -Pronation/supination with fist/open hand, 2 min each  -intrinsic strengthening with spidey band 2 minutes  -intrinsic strengthening with red foam between fingers 2 minutes    Home Exercises and Education Provided     Education provided:   - Progress towards goals     Written Home Exercises Provided: Patient instructed to cont prior HEP.  Exercises were reviewed and Ani was able to demonstrate them prior to the end of the session.  Ani demonstrated good  understanding of the HEP provided.   .   See EMR under Patient Instructions for exercises provided prior visit.        Assessment   Good session today. Her ranges of motion continue to improve and pain remains under control. Provided patient kinesiotape at distal arm with training for removal as needed.      Pt would continue to benefit from skilled OT.      Ani is progressing well towards her goals and there are no updates to goals at this time. Pt prognosis is Excellent.     Pt will  continue to benefit from skilled outpatient occupational therapy to address the deficits listed in the problem list on initial evaluation provide pt/family education and to maximize pt's level of independence in the home and community environment.     Anticipated barriers to occupational therapy: None    Pt's spiritual, cultural and educational needs considered and pt agreeable to plan of care and goals.    Goals:  ( LTG's (6-8 weeks):  1) Increase R wrist GOOD to >90% of the L wrist to increase functional hand use for weight bearing and reaching tasks.  2) R  strength  to be > 70 % of the unaffected side.  3) R pinch strength to be >60% of the unaffected side.  4) Decrease complaints of pain to 0 out of 10 at worst to increase functional hand use and UE support functions for moderate to heavy ADL/work/leisure activities.  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     STG's (4-5 weeks)  1) Patient to be IND with HEP and modalities for pain/edema managment.  2) Pt to tolerate advancing PREs and flexibility activities for weight bearing positions with self-graded intensity and effective symptom monitoring.   3) Increase wrist and forearm GOOD by 15 degrees to increase functional hand use and support function positions for ADLs/work/leisure activities.  4) Digit and Thumb GOOD to be WNL as compared to the unaffected side for maximizing  and fine motor skills for reactivation the hand for light ADLs.  5) Decrease edema by 1 cm for evidencing soft tissue healing and effective edema mgmt.     Plan   Updates/Grading for next session: continue gentle active range of motion, continue to manage sensitivity at incision site and  AROM.      CARLOS WASHBURN, OT

## 2022-05-13 ENCOUNTER — CLINICAL SUPPORT (OUTPATIENT)
Dept: REHABILITATION | Facility: HOSPITAL | Age: 32
End: 2022-05-13
Payer: COMMERCIAL

## 2022-05-13 DIAGNOSIS — M25.631 DECREASED RANGE OF MOTION OF RIGHT WRIST: ICD-10-CM

## 2022-05-13 DIAGNOSIS — Z78.9 DECREASED ACTIVITIES OF DAILY LIVING (ADL): ICD-10-CM

## 2022-05-13 DIAGNOSIS — M25.531 PAIN IN RIGHT WRIST: Primary | ICD-10-CM

## 2022-05-13 PROCEDURE — 97022 WHIRLPOOL THERAPY: CPT | Mod: PN

## 2022-05-13 PROCEDURE — 97110 THERAPEUTIC EXERCISES: CPT | Mod: PN

## 2022-05-13 PROCEDURE — 97140 MANUAL THERAPY 1/> REGIONS: CPT | Mod: PN

## 2022-05-13 NOTE — PROGRESS NOTES
Occupational Therapy Daily Treatment Note     Name: Ani Montoya  Clinic Number: [id number]    Therapy Diagnosis:   No diagnosis found.  Physician: Freda Ravi PA-C    Visit Date: 5/13/2022  Medical Diagnosis: S52.501A (ICD-10-CM) - Closed fracture of distal end of right radius, unspecified fracture morphology, initial encounter  Physician Orders: MD ORDERS: OT/CHT Eval &Tx; Appointment 7-9 days s/p surgery on 4/7/2022- needs custom thermoplast wrist splint fabrication and initiation of gentle AROM  Evaluation Date: 4/19/2022  Insurance Authorization Period Expiration: through 12/31/22  Plan of Care from 4/19/2022 to 6/24/22   Surgery Date and Procedure: 4/7/22 -   1. Open reduction internal fixation of right 2 part intra-articular distal radius fracture.  2. Use of X-ray for fracture reduction.   Date of Return to MD: 4/21/22    Visit # / Visits authorized: 5 / 60  Time In:1045  Time Out: 1131  Total Billable Time: 46 minutes    Precautions: Standard, Weightbearing She is now 4 weeks post-op as of 5/9/22.  Date of Onset: 4/2/22    Subjective     Pt reports: She notices increased soreness on radial aspect of arm from regular use.  she was compliant with home exercise program given last session.   Response to previous treatment: No adverse reactions.  Functional change: increased flexion    Pain: 1/10  Location: right wrist, incision site.    Pronation and supination results in 4/10 pain.    Objective   Edema: Circumferential measurements: In centimters       Right Right/Left     IE- 5/2/22   IF P1  5.5 5.4/5.5   SF P1   4.8 4.6/4.9   Thumb P1 5.7 5.5/5.5   DPC 17.5/17 16.8/   Wrist 15.5/13.5 15.0/             Digit and Thumb AROM (in centimeters)  Measured from tip to DPC Right Right   DATE IE-    IF  .5cm 0cm   Thumb 0 0cm             Wrist AROM    Right/Left Right Right Right   DATE IE- 4/25 5/2 5/13/22   EXT 50/88 50 56 67   FLX 35/90 45 58 68   RD 21/55  9 32   UD  39/57  48 56   GOOD 145/290  171 (+26)  223 (+52)      Forearm AROM    Right/Left Right Right   DATE IE- 5/2/22 5/13/22   Supination 15 48 57 (+9)   Pronation 85 77 90 (+13)      Ani received the following supervised modalities after being cleared for contradictions for 15 minutes:   - Fluidotherapy provided to increase localized blood flow which promotes tissue extensibility and healing. Also, use of fluidotherapy specifically is an additional sensory bombardment which can contribute to normalized light touch sensation at sensitive incision site on right wrist.  Defer - Paraffin to right hand for 10 min, pre-tx to decrease pain & increase tissue extensibility    Ani received the following manual therapy techniques for 12 minutes:   -scar massage was very sensitive around site.  -training and application of kinesiotape at distal forearm for desensitization and improving wrist/forearm pain.  -Edema mgmt w/ lymphatic drainage technique;  gentle STM and scar mgmt to left wrist, using hand techniques to increase blood flow/circulation, improve soft tissue extensability and decrease pain.    Ani received therapeutic exercises for 18 minutes including:  Reassessed ranges of motion 5/13/22  -wrist flexion/extension with fist and with open hand, 3 min each  -Pronation/supination with fist/open hand, 3 min each  -intrinsic strengthening with spidey band 3 minutes  -intrinsic strengthening with red foam between fingers 3 minutes    Home Exercises and Education Provided     Education provided:   - Progress towards goals     Written Home Exercises Provided: Patient instructed to cont prior HEP.  Exercises were reviewed and Ani was able to demonstrate them prior to the end of the session.  Ani demonstrated good  understanding of the HEP provided.   .   See EMR under Patient Instructions for exercises provided prior visit.        Assessment   Good session today. Her ranges of motion continue to improve and pain remains under control. Provided patient  kinesiotape at distal arm with training for removal as needed.      Pt would continue to benefit from skilled OT.      Ani is progressing well towards her goals and there are no updates to goals at this time. Pt prognosis is Excellent.     Pt will continue to benefit from skilled outpatient occupational therapy to address the deficits listed in the problem list on initial evaluation provide pt/family education and to maximize pt's level of independence in the home and community environment.     Anticipated barriers to occupational therapy: None    Pt's spiritual, cultural and educational needs considered and pt agreeable to plan of care and goals.    Goals:  ( LTG's (6-8 weeks):  1) Increase R wrist GOOD to >90% of the L wrist to increase functional hand use for weight bearing and reaching tasks.  2) R  strength  to be > 70 % of the unaffected side.  3) R pinch strength to be >60% of the unaffected side.  4) Decrease complaints of pain to 0 out of 10 at worst to increase functional hand use and UE support functions for moderate to heavy ADL/work/leisure activities.  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     STG's (4-5 weeks)  1) Patient to be IND with HEP and modalities for pain/edema managment.  2) Pt to tolerate advancing PREs and flexibility activities for weight bearing positions with self-graded intensity and effective symptom monitoring.   3) Increase wrist and forearm GOOD by 15 degrees to increase functional hand use and support function positions for ADLs/work/leisure activities. Met 5/13/22  4) Digit and Thumb GOOD to be WNL as compared to the unaffected side for maximizing  and fine motor skills for  reactivation the hand for light ADLs. Met 5/13/22  5) Decrease edema by 1 cm for evidencing soft tissue healing and effective edema mgmt.     Plan   Updates/Grading for next session: continue gentle active range of motion, continue to manage sensitivity at incision site and AROM.      CARLOS WASHBURN, OT

## 2022-05-17 ENCOUNTER — TELEPHONE (OUTPATIENT)
Dept: REHABILITATION | Facility: HOSPITAL | Age: 32
End: 2022-05-17
Payer: COMMERCIAL

## 2022-05-17 DIAGNOSIS — R52 PAIN: Primary | ICD-10-CM

## 2022-05-17 NOTE — TELEPHONE ENCOUNTER
Left VM over 3rd missed visit.  Reminded patient of her next appointment time and reminded her of attendance policy.

## 2022-05-20 ENCOUNTER — CLINICAL SUPPORT (OUTPATIENT)
Dept: REHABILITATION | Facility: HOSPITAL | Age: 32
End: 2022-05-20
Payer: COMMERCIAL

## 2022-05-20 DIAGNOSIS — M25.531 PAIN IN RIGHT WRIST: Primary | ICD-10-CM

## 2022-05-20 DIAGNOSIS — M25.631 DECREASED RANGE OF MOTION OF RIGHT WRIST: ICD-10-CM

## 2022-05-20 DIAGNOSIS — Z78.9 DECREASED ACTIVITIES OF DAILY LIVING (ADL): ICD-10-CM

## 2022-05-20 PROCEDURE — 97022 WHIRLPOOL THERAPY: CPT | Mod: PN

## 2022-05-20 PROCEDURE — 97110 THERAPEUTIC EXERCISES: CPT | Mod: PN

## 2022-05-20 PROCEDURE — 97140 MANUAL THERAPY 1/> REGIONS: CPT | Mod: PN

## 2022-05-20 NOTE — PROGRESS NOTES
Occupational Therapy Daily Treatment Note     Name: Ani Montoya  Clinic Number: [id number]    Therapy Diagnosis:   Encounter Diagnoses   Name Primary?    Pain in right wrist Yes    Decreased range of motion of right wrist     Decreased activities of daily living (ADL)      Physician: Freda Ravi PA-C    Visit Date: 5/20/2022  Medical Diagnosis: S52.501A (ICD-10-CM) - Closed fracture of distal end of right radius, unspecified fracture morphology, initial encounter  Physician Orders: MD ORDERS: OT/CHT Eval &Tx; Appointment 7-9 days s/p surgery on 4/7/2022- needs custom thermoplast wrist splint fabrication and initiation of gentle AROM  Evaluation Date: 4/19/2022  Insurance Authorization Period Expiration: through 12/31/22  Plan of Care from 4/19/2022 to 6/24/22   Surgery Date and Procedure: 4/7/22 -   1. Open reduction internal fixation of right 2 part intra-articular distal radius fracture.  2. Use of X-ray for fracture reduction.   Date of Return to MD: 4/21/22    Visit # / Visits authorized: 6 / 60  Time In:1000  Time Out: 1047  Total Billable Time: 47 minutes    Precautions: Standard, Weightbearing She is now 5 weeks post-op as of 5/9/22.  Date of Onset: 4/2/22    Subjective     Pt reports: Patient having a typical day.  she was compliant with home exercise program given last session.   Response to previous treatment: No adverse reactions.  Functional change: increased flexion    Pain: 2/10  Location: right wrist, incision site.    Pronation and supination results in 4/10 pain.    Objective   Edema: Circumferential measurements: In centimters       Right Right/Left Right     IE- 5/2/22 5/20   IF P1  5.5 5.4/5.5 5.2   SF P1   4.8 4.6/4.9 4.5   Thumb P1 5.7 5.5/5.5 5.5   DPC 17.5/17 16.8/ 16.3   Wrist 15.5/13.5 15.0/ 14.5              Digit and Thumb AROM (in centimeters)  Measured from tip to DPC Right Right   DATE IE-    IF  .5cm 0cm   Thumb 0 0cm             Wrist AROM    Right/Left Right Right Right  Right   DATE IE- 4/25 5/2 5/13/22 5/20   EXT 50/88 50 56 67 70   FLX 35/90 45 58 68 80   RD 21/55  9 32 54   UD  39/57  48 56 56   GOOD 145/290  171 (+26) 223 (+52) 260 (+37)      Forearm AROM    Right/Left Right Right Right   DATE IE- 5/2/22 5/13/22 5/20/22   Supination 15 48 57 (+9) 68 (+11)   Pronation 85 77 90 (+13) =      Ani received the following supervised modalities after being cleared for contradictions for 10 minutes:   - Fluidotherapy provided to increase localized blood flow which promotes tissue extensibility and healing. Also, use of fluidotherapy specifically is an additional sensory bombardment which can contribute to normalized light touch sensation at sensitive incision site on right wrist.  Defer - Paraffin to right hand for 10 min, pre-tx to decrease pain & increase tissue extensibility    Ani received the following manual therapy techniques for 12 minutes:   -scar massage was very sensitive around site.  -training and application of kinesiotape at distal forearm for desensitization and improving wrist/forearm pain.  -Edema mgmt w/ lymphatic drainage technique;  gentle STM and scar mgmt to left wrist, using hand techniques to increase blood flow/circulation, improve soft tissue extensability and decrease pain.    Ani received therapeutic exercises for 25 minutes including:  Reassessed ranges of motion 5/20/22  -wrist flexion/extension with fist and with open hand, 3 min each  -Pronation/supination with fist/open hand, 3 min each  -intrinsic strengthening with spidey band 3 minutes  -intrinsic strengthening with blue foam between fingers 3 minutes    Home Exercises and Education Provided     Education provided:   - Progress towards goals     Written Home Exercises Provided: Patient instructed to cont prior HEP.  Exercises were reviewed and Ani was able to demonstrate them prior to the end of the session.  Ani demonstrated good  understanding of the HEP provided.   .   See EMR under  Patient Instructions for exercises provided prior visit.        Assessment   Good session today. Her ranges of motion continue to improve and pain remains under control. Provided patient kinesiotape at distal arm with training for removal as needed.    Pt would continue to benefit from skilled OT.      Ani is progressing well towards her goals and there are no updates to goals at this time. Pt prognosis is Excellent.     Pt will continue to benefit from skilled outpatient occupational therapy to address the deficits listed in the problem list on initial evaluation provide pt/family education and to maximize pt's level of independence in the home and community environment.     Anticipated barriers to occupational therapy: None    Pt's spiritual, cultural and educational needs considered and pt agreeable to plan of care and goals.    Goals:  ( LTG's (6-8 weeks):  1) Increase R wrist GOOD to >90% of the L wrist to increase functional hand use for weight bearing and reaching tasks.  2) R  strength  to be > 70 % of the unaffected side.  3) R pinch strength to be >60% of the unaffected side.  4) Decrease complaints of pain to 0 out of 10 at worst to increase functional hand use and UE support functions for moderate to heavy ADL/work/leisure activities.  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     STG's (4-5 weeks)  1) Patient to be IND with HEP and modalities for pain/edema managment.  2) Pt to tolerate advancing PREs and flexibility activities for weight bearing positions with self-graded intensity and effective symptom monitoring.   3) Increase wrist and forearm GOOD by 15 degrees to increase functional hand use and  support function positions for ADLs/work/leisure activities. Met 5/13/22  4) Digit and Thumb GOOD to be WNL as compared to the unaffected side for maximizing  and fine motor skills for reactivation the hand for light ADLs. Met 5/13/22  5) Decrease edema by 1 cm for evidencing soft tissue healing and effective edema mgmt.     Plan   Updates/Grading for next session: continue gentle active range of motion, continue to manage sensitivity at incision site and AROM. Review MD instructions following upcoming visit.      CARLOS WASHBURN, OT

## 2022-05-23 ENCOUNTER — CLINICAL SUPPORT (OUTPATIENT)
Dept: REHABILITATION | Facility: HOSPITAL | Age: 32
End: 2022-05-23
Payer: COMMERCIAL

## 2022-05-23 DIAGNOSIS — Z78.9 DECREASED ACTIVITIES OF DAILY LIVING (ADL): ICD-10-CM

## 2022-05-23 DIAGNOSIS — M25.631 DECREASED RANGE OF MOTION OF RIGHT WRIST: ICD-10-CM

## 2022-05-23 DIAGNOSIS — M25.531 PAIN IN RIGHT WRIST: Primary | ICD-10-CM

## 2022-05-23 PROCEDURE — 97110 THERAPEUTIC EXERCISES: CPT | Mod: PN

## 2022-05-23 PROCEDURE — 97140 MANUAL THERAPY 1/> REGIONS: CPT | Mod: PN

## 2022-05-23 PROCEDURE — 97018 PARAFFIN BATH THERAPY: CPT | Mod: PN

## 2022-05-23 NOTE — PROGRESS NOTES
Occupational Therapy Daily Treatment Note     Name: Ani Montoya  Clinic Number: [id number]    Therapy Diagnosis:   Encounter Diagnoses   Name Primary?    Pain in right wrist Yes    Decreased range of motion of right wrist     Decreased activities of daily living (ADL)      Physician: Freda Ravi PA-C    Visit Date: 5/23/2022  Medical Diagnosis: S52.501A (ICD-10-CM) - Closed fracture of distal end of right radius, unspecified fracture morphology, initial encounter  Physician Orders: MD ORDERS: OT/CHT Eval &Tx; Appointment 7-9 days s/p surgery on 4/7/2022- needs custom thermoplast wrist splint fabrication and initiation of gentle AROM  Evaluation Date: 4/19/2022  Insurance Authorization Period Expiration: through 12/31/22  Plan of Care from 4/19/2022 to 6/24/22   Surgery Date and Procedure: 4/7/22 -   1. Open reduction internal fixation of right 2 part intra-articular distal radius fracture.  2. Use of X-ray for fracture reduction.   Date of Return to MD: 4/21/22    Visit # / Visits authorized: 8 / 60  Time In:1005  Time Out: 1045  Total Billable Time: 40 minutes    Precautions: Standard, Weightbearing She is now 7 weeks post-op as of 5/23/22.  Date of Onset: 4/2/22    Subjective     Pt reports: Patient having a typical day. Dog got hold of splint and chewed it a little.  she was compliant with home exercise program given last session.   Response to previous treatment: No adverse reactions.  Functional change: increased flexion    Pain: 0/10  Location: right wrist, incision site.    Pronation and supination results in 4/10 pain.    Objective   Edema: Circumferential measurements: In centimters       Right Right/Left Right     IE- 5/2/22 5/20   IF P1  5.5 5.4/5.5 5.2   SF P1   4.8 4.6/4.9 4.5   Thumb P1 5.7 5.5/5.5 5.5   DPC 17.5/17 16.8/ 16.3   Wrist 15.5/13.5 15.0/ 14.5              Digit and Thumb AROM (in centimeters)  Measured from tip to DPC Right Right   DATE IE-    IF  .5cm 0cm   Thumb 0 0cm              Wrist AROM    Right/Left Right Right Right Right   DATE IE- 4/25 5/2 5/13/22 5/20   EXT 50/88 50 56 67 70   FLX 35/90 45 58 68 80   RD 21/55  9 32 54   UD  39/57  48 56 56   GOOD 145/290  171 (+26) 223 (+52) 260 (+37)      Forearm AROM    Right/Left Right Right Right   DATE IE- 5/2/22 5/13/22 5/20/22   Supination 15 48 57 (+9) 68 (+11)   Pronation 85 77 90 (+13) =      Ani received the following supervised modalities after being cleared for contradictions for 8 minutes:   Defer - Fluidotherapy provided to increase localized blood flow which promotes tissue extensibility and healing. Also, use of fluidotherapy specifically is an additional sensory bombardment which can contribute to normalized light touch sensation at sensitive incision site on right wrist.  - Paraffin to right hand for 10 min, pre-tx to decrease pain & increase tissue extensibility    Ani received the following manual therapy techniques for 12 minutes:   -scar massage was very sensitive around site.  -training and application of kinesiotape at distal forearm for desensitization and improving wrist/forearm pain.  -Edema mgmt w/ lymphatic drainage technique;  gentle STM and scar mgmt to left wrist, using hand techniques to increase blood flow/circulation, improve soft tissue extensability and decrease pain.    Ani received therapeutic exercises for 20 minutes including:  Reassessed ranges of motion 5/20/22  -wrist flexion/extension with fist and with open hand, 3 min each  -Wrist ulnar/radial deviation with fist and open hand, 3 minutes each  -Pronation/supination with fist/open hand, 3 min each  -intrinsic strengthening with spidey band 3 minutes  -intrinsic strengthening with blue foam between fingers 3 minutes    Home Exercises and Education Provided     Education provided:   - Progress towards goals     Written Home Exercises Provided: Patient instructed to cont prior HEP.  Exercises were reviewed and Ani was able to demonstrate  them prior to the end of the session.  Ani demonstrated good  understanding of the HEP provided.   .   See EMR under Patient Instructions for exercises provided prior visit.        Assessment   Good session today. For the first session since she started, she was without pain.  OT smoothed out edges of splint which patient's dog disturbed.  Provided patient kinesiotape at distal arm with training for removal as needed.    Pt would continue to benefit from skilled OT.      Ani is progressing well towards her goals and there are no updates to goals at this time. Pt prognosis is Excellent.     Pt will continue to benefit from skilled outpatient occupational therapy to address the deficits listed in the problem list on initial evaluation provide pt/family education and to maximize pt's level of independence in the home and community environment.     Anticipated barriers to occupational therapy: None    Pt's spiritual, cultural and educational needs considered and pt agreeable to plan of care and goals.    Goals:  ( LTG's (6-8 weeks):  1) Increase R wrist GOOD to >90% of the L wrist to increase functional hand use for weight bearing and reaching tasks.  2) R  strength  to be > 70 % of the unaffected side.  3) R pinch strength to be >60% of the unaffected side.  4) Decrease complaints of pain to 0 out of 10 at worst to increase functional hand use and UE support functions for moderate to heavy ADL/work/leisure activities.  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     STG's (4-5 weeks)  1) Patient to be IND with HEP and modalities for pain/edema managment.  2) Pt to tolerate advancing PREs and flexibility activities for  weight bearing positions with self-graded intensity and effective symptom monitoring.   3) Increase wrist and forearm GOOD by 15 degrees to increase functional hand use and support function positions for ADLs/work/leisure activities. Met 5/13/22  4) Digit and Thumb GOOD to be WNL as compared to the unaffected side for maximizing  and fine motor skills for reactivation the hand for light ADLs. Met 5/13/22  5) Decrease edema by 1 cm for evidencing soft tissue healing and effective edema mgmt.     Plan   Updates/Grading for next session: continue gentle active range of motion, continue to manage sensitivity at incision site and AROM. Review MD instructions following upcoming visit.      CARLOS WASHBURN, OT

## 2022-05-24 ENCOUNTER — OFFICE VISIT (OUTPATIENT)
Dept: ORTHOPEDICS | Facility: CLINIC | Age: 32
End: 2022-05-24
Payer: COMMERCIAL

## 2022-05-24 ENCOUNTER — HOSPITAL ENCOUNTER (OUTPATIENT)
Dept: RADIOLOGY | Facility: HOSPITAL | Age: 32
Discharge: HOME | End: 2022-05-24
Attending: ORTHOPAEDIC SURGERY
Payer: COMMERCIAL

## 2022-05-24 ENCOUNTER — TELEPHONE (OUTPATIENT)
Dept: ORTHOPEDICS | Facility: CLINIC | Age: 32
End: 2022-05-24
Payer: COMMERCIAL

## 2022-05-24 DIAGNOSIS — R52 PAIN: ICD-10-CM

## 2022-05-24 DIAGNOSIS — S52.551D OTHER CLOSED EXTRA-ARTICULAR FRACTURE OF DISTAL END OF RIGHT RADIUS WITH ROUTINE HEALING, SUBSEQUENT ENCOUNTER: Primary | ICD-10-CM

## 2022-05-24 PROCEDURE — 99999 PR PBB SHADOW E&M-EST. PATIENT-LVL III: ICD-10-PCS | Mod: PBBFAC,,, | Performed by: ORTHOPAEDIC SURGERY

## 2022-05-24 PROCEDURE — 73110 XR WRIST COMPLETE 3 VIEWS RIGHT: ICD-10-PCS | Mod: 26,RT,, | Performed by: RADIOLOGY

## 2022-05-24 PROCEDURE — 1160F PR REVIEW ALL MEDS BY PRESCRIBER/CLIN PHARMACIST DOCUMENTED: ICD-10-PCS | Mod: CPTII,S$GLB,, | Performed by: ORTHOPAEDIC SURGERY

## 2022-05-24 PROCEDURE — 99024 PR POST-OP FOLLOW-UP VISIT: ICD-10-PCS | Mod: S$GLB,,, | Performed by: ORTHOPAEDIC SURGERY

## 2022-05-24 PROCEDURE — 73110 X-RAY EXAM OF WRIST: CPT | Mod: TC,RT

## 2022-05-24 PROCEDURE — 1160F RVW MEDS BY RX/DR IN RCRD: CPT | Mod: CPTII,S$GLB,, | Performed by: ORTHOPAEDIC SURGERY

## 2022-05-24 PROCEDURE — 73110 X-RAY EXAM OF WRIST: CPT | Mod: 26,RT,, | Performed by: RADIOLOGY

## 2022-05-24 PROCEDURE — 1159F MED LIST DOCD IN RCRD: CPT | Mod: CPTII,S$GLB,, | Performed by: ORTHOPAEDIC SURGERY

## 2022-05-24 PROCEDURE — 99999 PR PBB SHADOW E&M-EST. PATIENT-LVL III: CPT | Mod: PBBFAC,,, | Performed by: ORTHOPAEDIC SURGERY

## 2022-05-24 PROCEDURE — 1159F PR MEDICATION LIST DOCUMENTED IN MEDICAL RECORD: ICD-10-PCS | Mod: CPTII,S$GLB,, | Performed by: ORTHOPAEDIC SURGERY

## 2022-05-24 PROCEDURE — 99024 POSTOP FOLLOW-UP VISIT: CPT | Mod: S$GLB,,, | Performed by: ORTHOPAEDIC SURGERY

## 2022-05-27 ENCOUNTER — TELEPHONE (OUTPATIENT)
Dept: REHABILITATION | Facility: HOSPITAL | Age: 32
End: 2022-05-27

## 2022-05-29 NOTE — PROGRESS NOTES
Ani Montoya presents for post-operative evaluation of   Encounter Diagnosis   Name Primary?    Other closed extra-articular fracture of distal end of right radius with routine healing, subsequent encounter Yes   The patient is now 6 weeks s/p ORIF of right wrist.  Overall the patient reports doing well. She reports no pain and has great ROM.     PE:    AA&O x 4.  NAD  HEENT:  NCAT, sclera nonicteric  Lungs:  Respirations are equal and unlabored.  CV:  2+ bilateral upper and lower extremity pulses.  MSK: The incision is well healed.  Full wrist and finger motion.  Neurovascularly intact and has 5/5 thenar and intrinsic musculature strength.    X-Ray Wrist Complete Right  EXAMINATION:  XR WRIST COMPLETE 3 VIEWS RIGHT    CLINICAL HISTORY:  Pain, unspecified    FINDINGS:  Wrist complete three views right: There is a distal radial fracture with a sideplate good alignment and no complication.  No hardware failure seen.    Electronically signed by: Jerson Luz MD  Date:    05/24/2022  Time:    15:00        A/P: Status post above, doing well  1) Continue with range of motion and strengthening; she can return to light duty work with no pushing, pulling or lifting greater than 5 lbs.  2) F/U 6 weeks with new xrays  3) Call with any questions/concerns in the interim        Tari Adhikari MD     Please be aware that this note has been generated with the assistance of [employer] voice-to-text.  Please excuse any spelling or grammatical errors.

## 2022-05-30 ENCOUNTER — CLINICAL SUPPORT (OUTPATIENT)
Dept: REHABILITATION | Facility: HOSPITAL | Age: 32
End: 2022-05-30
Payer: COMMERCIAL

## 2022-05-30 DIAGNOSIS — Z78.9 DECREASED ACTIVITIES OF DAILY LIVING (ADL): ICD-10-CM

## 2022-05-30 DIAGNOSIS — M25.631 DECREASED RANGE OF MOTION OF RIGHT WRIST: ICD-10-CM

## 2022-05-30 DIAGNOSIS — M25.531 PAIN IN RIGHT WRIST: Primary | ICD-10-CM

## 2022-05-30 PROCEDURE — 97022 WHIRLPOOL THERAPY: CPT | Mod: PN

## 2022-05-30 PROCEDURE — 97110 THERAPEUTIC EXERCISES: CPT | Mod: PN

## 2022-05-30 NOTE — PATIENT INSTRUCTIONS
[facility] OCCUPATIONAL THERAPY  HOME EXERCISE PROGRAM   CARLOS WASHBURN, OT

## 2022-05-30 NOTE — PROGRESS NOTES
Occupational Therapy Daily Treatment Note     Name: Ani Montoya  Clinic Number: [id number]    Therapy Diagnosis:   Encounter Diagnoses   Name Primary?    Pain in right wrist Yes    Decreased range of motion of right wrist     Decreased activities of daily living (ADL)      Physician: Freda Ravi PA-C    Visit Date: 5/30/2022  Medical Diagnosis: S52.501A (ICD-10-CM) - Closed fracture of distal end of right radius, unspecified fracture morphology, initial encounter  Physician Orders: MD ORDERS: OT/CHT Eval &Tx; Appointment 7-9 days s/p surgery on 4/7/2022- needs custom thermoplast wrist splint fabrication and initiation of gentle AROM  Evaluation Date: 4/19/2022  Insurance Authorization Period Expiration: through 12/31/22  Plan of Care from 4/19/2022 to 6/24/22   Surgery Date and Procedure: 4/7/22 -   1. Open reduction internal fixation of right 2 part intra-articular distal radius fracture.  2. Use of X-ray for fracture reduction.   Date of Return to MD: 4/21/22    Visit # / Visits authorized: 9 / 60  Time In:1011  Time Out: 1045  Total Billable Time: 34 minutes    Precautions: Standard, Weightbearing She is now 8 weeks post-op as of 5/30/22.  Date of Onset: 4/2/22    Subjective     Pt reports: Patient having a typical day. She is not wearing splint except for heavy activities.  she was compliant with home exercise program given last session.   Response to previous treatment: No adverse reactions.  Functional change: increased flexion    Pain: 0/10  Location: right wrist, incision site.    Pronation and supination results in 4/10 pain.    Objective   Edema: Circumferential measurements: In centimters       Right Right/Left Right     IE- 5/2/22 5/20   IF P1  5.5 5.4/5.5 5.2   SF P1   4.8 4.6/4.9 4.5   Thumb P1 5.7 5.5/5.5 5.5   DPC 17.5/17 16.8/ 16.3   Wrist 15.5/13.5 15.0/ 14.5              Digit and Thumb AROM (in centimeters)  Measured from tip to DPC Right Right   DATE IE-    IF  .5cm 0cm   Thumb 0  0cm             Wrist AROM    Right/Left Right Right Right Right   DATE IE- 4/25 5/2 5/13/22 5/20   EXT 50/88 50 56 67 70   FLX 35/90 45 58 68 80   RD 21/55  9 32 54   UD  39/57  48 56 56   GOOD 145/290  171 (+26) 223 (+52) 260 (+37)      Forearm AROM    Right/Left Right Right Right   DATE IE- 5/2/22 5/13/22 5/20/22   Supination 15 48 57 (+9) 68 (+11)   Pronation 85 77 90 (+13) =      Ani received the following supervised modalities after being cleared for contradictions for 8 minutes:   - Fluidotherapy provided to increase localized blood flow which promotes tissue extensibility and healing. Also, use of fluidotherapy specifically is an additional sensory bombardment which can contribute to normalized light touch sensation at sensitive incision site on right wrist.  Defer - Paraffin to right hand for 10 min, pre-tx to decrease pain & increase tissue extensibility    Ani received the following manual therapy techniques for 0 minutes:   -defer - scar massage was very sensitive around site.  -defer - training and application of kinesiotape at distal forearm for desensitization and improving wrist/forearm pain.  -defer - Edema mgmt w/ lymphatic drainage technique;  gentle STM and scar mgmt to left wrist, using hand techniques to increase blood flow/circulation, improve soft tissue extensability and decrease pain.    Ani received therapeutic exercises for 26 minutes including:  Putty exercises - red   -composite squeezes   -3pt, 2pt, and lateral pinches   -thumb digs    Reassessed ranges of motion 5/20/22  Deferred to HEP  -wrist flexion/extension with fist and with open hand, 3 min each  -Wrist ulnar/radial deviation with fist and open hand, 3 minutes each  -Pronation/supination with fist/open hand, 3 min each  -intrinsic strengthening with spidey band 3 minutes  -intrinsic strengthening with blue foam between fingers 3 minutes    Home Exercises and Education Provided     Education provided:   - Progress  towards goals     Written Home Exercises Provided: Patient instructed to cont prior HEP.  Exercises were reviewed and Ani was able to demonstrate them prior to the end of the session.  Ani demonstrated good  understanding of the HEP provided.   .   See EMR under Patient Instructions for exercises provided prior visit.        Assessment   Good session today.Strengthening initiated with no adverse reactions.  Pt would continue to benefit from skilled OT.      Ani is progressing well towards her goals and there are no updates to goals at this time. Pt prognosis is Excellent.     Pt will continue to benefit from skilled outpatient occupational therapy to address the deficits listed in the problem list on initial evaluation provide pt/family education and to maximize pt's level of independence in the home and community environment.     Anticipated barriers to occupational therapy: None    Pt's spiritual, cultural and educational needs considered and pt agreeable to plan of care and goals.    Goals:  ( LTG's (6-8 weeks):  1) Increase R wrist GOOD to >90% of the L wrist to increase functional hand use for weight bearing and reaching tasks.  2) R  strength  to be > 70 % of the unaffected side.  3) R pinch strength to be >60% of the unaffected side.  4) Decrease complaints of pain to 0 out of 10 at worst to increase functional hand use and UE support functions for moderate to heavy ADL/work/leisure activities.  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     STG's (4-5 weeks)  1) Patient to be IND with HEP and modalities for pain/edema managment.  2) Pt to tolerate advancing PREs and flexibility activities for weight  bearing positions with self-graded intensity and effective symptom monitoring.   3) Increase wrist and forearm GOOD by 15 degrees to increase functional hand use and support function positions for ADLs/work/leisure activities. Met 5/13/22  4) Digit and Thumb GOOD to be WNL as compared to the unaffected side for maximizing  and fine motor skills for reactivation the hand for light ADLs. Met 5/13/22  5) Decrease edema by 1 cm for evidencing soft tissue healing and effective edema mgmt.     Plan   Updates/Grading for next session: advance PREs as tolerated    CARLOS WASHBURN, OT

## 2022-06-02 NOTE — PROGRESS NOTES
"  Occupational Therapy Daily Treatment Note     Name: Ani Montoya  Clinic Number: [id number]    Therapy Diagnosis:   Encounter Diagnoses   Name Primary?    Pain in right wrist Yes    Decreased range of motion of right wrist     Decreased activities of daily living (ADL)      Physician: Freda Ravi PA-C    Visit Date: 6/3/2022  Medical Diagnosis: S52.501A (ICD-10-CM) - Closed fracture of distal end of right radius, unspecified fracture morphology, initial encounter  Physician Orders: MD ORDERS: OT/CHT Eval &Tx; Appointment 7-9 days s/p surgery on 4/7/2022- needs custom thermoplast wrist splint fabrication and initiation of gentle AROM  Evaluation Date: 4/19/2022  Insurance Authorization Period Expiration: through 12/31/22  Plan of Care from 4/19/2022 to 6/24/22   Surgery Date and Procedure: 4/7/22 -   1. Open reduction internal fixation of right 2 part intra-articular distal radius fracture.  2. Use of X-ray for fracture reduction.   Date of Return to MD: 4/21/22    Visit # / Visits authorized: 10 / 60  Time In:1010  Time Out: 1050  Total Billable Time: 40 minutes    Precautions: Standard, Weightbearing. She is now 8 weeks post-op as of 5/30/22.  Date of Onset: 4/2/22    Subjective     Pt reports: Pt explains that her PA had told her that she does not have to wear her splint at work. 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 It has resolved to a "2" now. Patient having a typical day. She is not wearing splint except for heavy activities.  she was compliant with home exercise program given last session.   Response to previous treatment: No adverse reactions.  Functional change: increased flexion    Pain: 2/10  Location: ulnar right wrist and forearm  right wrist, incision site.    Pronation and supination results in 4/10 pain.    Objective   Edema: Circumferential measurements: In centimters       Right Right/Left Right Right     IE- 5/2/22 5/20 6/3/22   IF P1  5.5 5.4/5.5 5.2 5.3   SF P1  "  4.8 4.6/4.9 4.5 4.5   Thumb P1 5.7 5.5/5.5 5.5 5.3   DPC 17.5/17 16.8/ 16.3 15.7   Wrist 15.5/13.5 15.0/ 14.5 14.0               Digit and Thumb AROM (in centimeters)  Measured from tip to DPC Right Right   DATE IE-    IF  .5cm 0cm   Thumb 0 0cm             Wrist AROM    Right/Left Right Right Right Right   DATE IE- 4/25 5/2 5/13/22 5/20   EXT 50/88 50 56 67 70   FLX 35/90 45 58 68 80   RD 21/55  9 32 54   UD  39/57  48 56 56   GOOD 145/290  171 (+26) 223 (+52) 260 (+37)      Forearm AROM    Right/Left Right Right Right Right   DATE IE- 5/2/22 5/13/22 5/20/22 6/3/22   Supination 15 48 57 (+9) 68 (+11) 90   Pronation 85 77 90 (+13) = =      Ani received the following supervised modalities after being cleared for contradictions for 7 minutes:   - Fluidotherapy provided to increase localized blood flow which promotes tissue extensibility and healing. Also, use of fluidotherapy specifically is an additional sensory bombardment which can contribute to normalized light touch sensation at sensitive incision site on right wrist.  Defer - Paraffin to right hand for 10 min, pre-tx to decrease pain & increase tissue extensibility    Ani received the following manual therapy techniques for 8 minutes:   -application of kinesiotape at distal forearm for desensitization and improving wrist/forearm pain.  -defer - scar massage was very sensitive around site.  -defer - Edema mgmt w/ lymphatic drainage technique;  gentle STM and scar mgmt to left wrist, using hand techniques to increase blood flow/circulation, improve soft tissue extensability and decrease pain.    Ani received therapeutic exercises for 25 minutes including:  Putty exercises - red   -removing marbles   -composite squeezes   -3pt, 2pt, and lateral pinches   -finger spreads with and without thumb    Reassessed ranges of motion 5/20/22  Deferred to HEP  -wrist flexion/extension with fist and with open hand, 3 min each  -Wrist ulnar/radial deviation with fist  and open hand, 3 minutes each  -Pronation/supination with fist/open hand, 3 min each  -intrinsic strengthening with spidey band 3 minutes  -intrinsic strengthening with blue foam between fingers 3 minutes    Home Exercises and Education Provided     Education provided:   - Progress towards goals     Written Home Exercises Provided: Patient instructed to cont prior HEP.  Exercises were reviewed and Ani was able to demonstrate them prior to the end of the session.  Ani demonstrated good  understanding of the HEP provided.   .   See EMR under Patient Instructions for exercises provided prior visit.        Assessment   Good session today. Reached out to PA about pain. If pain increases, then patient will communicate with provider.  Pt would continue to benefit from skilled OT.      Ani is progressing well towards her goals and there are no updates to goals at this time. Pt prognosis is Excellent.     Pt will continue to benefit from skilled outpatient occupational therapy to address the deficits listed in the problem list on initial evaluation provide pt/family education and to maximize pt's level of independence in the home and community environment.     Anticipated barriers to occupational therapy: None    Pt's spiritual, cultural and educational needs considered and pt agreeable to plan of care and goals.    Goals:  ( LTG's (6-8 weeks):  1) Increase R wrist GOOD to >90% of the L wrist to increase functional hand use for weight bearing and reaching tasks.  2) R  strength  to be > 70 % of the unaffected side.  3) R pinch strength to be >60% of the unaffected side.  4) Decrease complaints of pain to 0 out of 10 at worst to increase functional hand use and UE support functions for moderate to heavy ADL/work/leisure activities.  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     STG's (4-5 weeks)  1) Patient to be IND with HEP and modalities for pain/edema managment.  2) Pt to tolerate advancing PREs and flexibility activities for weight bearing positions with self-graded intensity and effective symptom monitoring.   3) Increase wrist and forearm GOOD by 15 degrees to increase functional hand use and support function positions for ADLs/work/leisure activities. Met 5/13/22  4) Digit and Thumb GOOD to be WNL as compared to the unaffected side for maximizing  and fine motor skills for reactivation the hand for light ADLs. Met 5/13/22  5) Decrease edema by 1 cm for evidencing soft tissue healing and effective edema mgmt.     Plan   Updates/Grading for next session: advance PREs as tolerated    CARLOS WASHBURN, OT

## 2022-06-03 ENCOUNTER — CLINICAL SUPPORT (OUTPATIENT)
Dept: REHABILITATION | Facility: HOSPITAL | Age: 32
End: 2022-06-03
Payer: COMMERCIAL

## 2022-06-03 DIAGNOSIS — M25.631 DECREASED RANGE OF MOTION OF RIGHT WRIST: ICD-10-CM

## 2022-06-03 DIAGNOSIS — M25.531 PAIN IN RIGHT WRIST: Primary | ICD-10-CM

## 2022-06-03 DIAGNOSIS — Z78.9 DECREASED ACTIVITIES OF DAILY LIVING (ADL): ICD-10-CM

## 2022-06-03 PROCEDURE — 97140 MANUAL THERAPY 1/> REGIONS: CPT | Mod: PN

## 2022-06-03 PROCEDURE — 97022 WHIRLPOOL THERAPY: CPT | Mod: PN

## 2022-06-03 PROCEDURE — 97110 THERAPEUTIC EXERCISES: CPT | Mod: PN

## 2022-06-04 ENCOUNTER — PATIENT MESSAGE (OUTPATIENT)
Dept: OBSTETRICS AND GYNECOLOGY | Facility: CLINIC | Age: 32
End: 2022-06-04
Payer: COMMERCIAL

## 2022-06-06 DIAGNOSIS — N91.2 AMENORRHEA: ICD-10-CM

## 2022-06-06 DIAGNOSIS — E23.0 HYPOPITUITARISM: ICD-10-CM

## 2022-06-06 RX ORDER — PROGESTERONE 100 MG/1
100 CAPSULE ORAL NIGHTLY
Qty: 12 CAPSULE | Refills: 0 | Status: SHIPPED | OUTPATIENT
Start: 2022-06-06 | End: 2022-11-18 | Stop reason: SDUPTHER

## 2022-06-06 RX ORDER — ESTRADIOL 0.5 MG/1
0.5 TABLET ORAL DAILY
Qty: 90 TABLET | Refills: 0 | Status: SHIPPED | OUTPATIENT
Start: 2022-06-06 | End: 2022-11-18 | Stop reason: SDUPTHER

## 2022-06-07 ENCOUNTER — PATIENT MESSAGE (OUTPATIENT)
Dept: ORTHOPEDICS | Facility: CLINIC | Age: 32
End: 2022-06-07
Payer: COMMERCIAL

## 2022-06-14 ENCOUNTER — CLINICAL SUPPORT (OUTPATIENT)
Dept: REHABILITATION | Facility: HOSPITAL | Age: 32
End: 2022-06-14
Payer: COMMERCIAL

## 2022-06-14 DIAGNOSIS — Z78.9 DECREASED ACTIVITIES OF DAILY LIVING (ADL): ICD-10-CM

## 2022-06-14 DIAGNOSIS — M25.631 DECREASED RANGE OF MOTION OF RIGHT WRIST: ICD-10-CM

## 2022-06-14 DIAGNOSIS — M25.531 PAIN IN RIGHT WRIST: Primary | ICD-10-CM

## 2022-06-14 PROCEDURE — 97022 WHIRLPOOL THERAPY: CPT | Mod: PN

## 2022-06-14 PROCEDURE — 97110 THERAPEUTIC EXERCISES: CPT | Mod: PN

## 2022-06-14 PROCEDURE — 97140 MANUAL THERAPY 1/> REGIONS: CPT | Mod: PN

## 2022-06-14 PROCEDURE — 97530 THERAPEUTIC ACTIVITIES: CPT | Mod: PN

## 2022-06-14 NOTE — PROGRESS NOTES
"  Occupational Therapy Daily Treatment Note     Name: Ani Montoya  Clinic Number: [id number]    Therapy Diagnosis:   Encounter Diagnoses   Name Primary?    Pain in right wrist Yes    Decreased range of motion of right wrist     Decreased activities of daily living (ADL)      Physician: Freda Ravi PA-C    Visit Date: 6/14/2022  Medical Diagnosis: S52.501A (ICD-10-CM) - Closed fracture of distal end of right radius, unspecified fracture morphology, initial encounter  Physician Orders: MD ORDERS: OT/CHT Eval &Tx; Appointment 7-9 days s/p surgery on 4/7/2022- needs custom thermoplast wrist splint fabrication and initiation of gentle AROM  Evaluation Date: 4/19/2022  Insurance Authorization Period Expiration: through 12/31/22  Plan of Care from 4/19/2022 to 6/24/22   Surgery Date and Procedure: 4/7/22 -   1. Open reduction internal fixation of right 2 part intra-articular distal radius fracture.  2. Use of X-ray for fracture reduction.   Date of Return to MD: 4/21/22    Visit # / Visits authorized: 12 / 60  Time In: 9:10 am  Time Out: 10:05 am  Total Billable Time: 52 minutes    Precautions: Standard, Weightbearing. She is now 9 weeks post-op as of 6/14/22.  Date of Surgery: 4/7/22    Subjective     Pt reports: 6/14: Pt explains going out of town this Sunday until the 26th. 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  Pt explains that her PA had told her that she does not have to wear her splint at work. 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 It has resolved to a "2" now. Patient having a typical day. She is not wearing splint except for heavy activities.  she was compliant with home exercise program given last session.   Response to previous treatment: No adverse reactions.  Functional change: increased flexion    Pain: 2/10 at worst with pressure to palm; denies pain with lifting 5#. 0/10 at rest  Location: ulnar right wrist and forearm  right " "wrist, incision site.    Pronation and supination results in 4/10 pain.    Objective   Edema: Circumferential measurements: In centimters       Right Right/Left Right Right Right     IE- 5/2/22 5/20 6/3/22 6/14/22   IF P1  5.5 5.4/5.5 5.2 5.3 5.5   SF P1   4.8 4.6/4.9 4.5 4.5    Thumb P1 5.7 5.5/5.5 5.5 5.3 5.5   DPC 17.5/17 (L) 16.8 16.3 15.7    Wrist 15.5/13.5 (L) 15.0 14.5 14.0 14.3/14.2                Digit and Thumb AROM (in centimeters)  Measured from tip to DPC Right Right   DATE IE-    IF  .5cm 0cm   Thumb 0 0cm             Wrist AROM    Right/Left Right Right Right Right   DATE IE- 4/25 5/2 5/13/22 5/20   EXT 50/88 50 56 67 70   FLX 35/90 45 58 68 80   RD 21/55  9 32 54   UD  39/57  48 56 56   GOOD 145/290  171 (+26) 223 (+52) 260 (+37)      Forearm AROM    Right/Left Right Right Right Right   DATE IE- 5/2/22 5/13/22 5/20/22 6/3/22   Supination 15 48 57 (+9) 68 (+11) 90   Pronation 85 77 90 (+13) = =        Strength: (measured in psi.)    Right/Left   DATE 6/14/22   Seb Rung II 28/38   Mendoza Pinch 12/13   3-pt Pinch 8.5/13   2-pt Pinch defer        Treatment     Ani received the following supervised modalities after being cleared for contradictions for 14 minutes:   - Fluidotherapy provided to increase localized blood flow which promotes tissue extensibility and healing. Also, use of fluidotherapy specifically is an additional sensory bombardment which can contribute to normalized light touch sensation at sensitive incision site on right wrist.    Ani received the following manual therapy techniques for 8 minutes:   - R/A girth  - Grade II-III Rad/Carp mobilizations  - defer-Grade II-II Rad/Carp mobilizations with movement    Ani received therapeutic exercises for 30 minutes including:  TGEs, lifts, spreads, each Wrist AROM, active extrinsic extensor and flexor lengthening 1x10 each performed during final minutes of Fluidotherapy             Weight bearing Progression 30" x 3 each Stance with " extended hands on tabletop, 3 ways:  - forward  - side/side  - shoulder rotation  - Wall push ups graduating wrist position and stance; 3 min    Work simulations 2 min each:  - Gentle, low-grade chest compressions in stance over foam disc  - defer- cart push/pull  - defer- patient physical assist   Objective measures    HEP as upgraded 6/14: Weight bearing progression       Home Exercises and Education Provided     Education provided:   - Progress towards goals   - HEP as upgraded    Written Home Exercises Provided: yes.  Exercises were reviewed and Ani was able to demonstrate them prior to the end of the session.  Ani demonstrated good  understanding of the HEP provided.   .   See EMR under Patient Instructions for exercises provided 6/14/22.        Assessment   6/14: Pain from last week mostly resolved. She appeared to tolerate weight bearing progression. Suspect Rad/Carp impingmenet. Tx program and HEP upgraded according to work demands.    Pt would continue to benefit from skilled OT.      Ani is progressing well towards her goals and there are no updates to goals at this time. Pt prognosis is Excellent.     Pt will continue to benefit from skilled outpatient occupational therapy to address the deficits listed in the problem list on initial evaluation provide pt/family education and to maximize pt's level of independence in the home and community environment.     Anticipated barriers to occupational therapy: None    Pt's spiritual, cultural and educational needs considered and pt agreeable to plan of care and goals.    Goals:  ( LTG's (6-8 weeks):  1) Increase R wrist GOOD to >90% of the L wrist to increase functional hand use for weight bearing and reaching tasks. Progressing  2) R  strength  to be > 70 % of the unaffected side. MET 6/14/2022  3) R pinch strength to be >60% of the unaffected side. MET 6/14/2022  4) Decrease complaints of pain to 0 out of 10 at worst to increase functional hand use  and UE support functions for moderate to heavy ADL/work/leisure activities.   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 Progressing     STG's (4-5 weeks)  1) Patient to be IND with HEP and modalities for pain/edema managment. Ongoing  2) Pt to tolerate advancing PREs and flexibility activities for weight bearing positions with self-graded intensity and effective symptom monitoring. Progressing  3) Increase wrist and forearm GOOD by 15 degrees to increase functional hand use and support function positions for ADLs/work/leisure activities. Met 5/13/22  4) Digit and Thumb GOOD to be WNL as compared to the unaffected side for maximizing  and fine motor skills for reactivation the hand for light ADLs. Met 5/13/22  5) Decrease edema by 1 cm for evidencing soft tissue healing and effective edema mgmt. MET 6/14/2022    Plan   6/14: R/A FOTO #14. R/A Wrist AROM. Continue manual intervention for reducing Rad/Carp impingement pain that is accompanying weight bearing on extended hand. Address LTGs. Upgrade POC accordingly.    Cailin Trayc OT

## 2022-06-14 NOTE — PATIENT INSTRUCTIONS
[facility], OCCUPATIONAL THERAPY  HOME EXERCISE PROGRAM     PERFORM STRENGTHENING EXERCISES EVERY OTHER DAY AND ALWAYS PAIN-FREE                              You can do these using the log also:

## 2022-06-30 ENCOUNTER — DOCUMENTATION ONLY (OUTPATIENT)
Dept: REHABILITATION | Facility: HOSPITAL | Age: 32
End: 2022-06-30
Payer: COMMERCIAL

## 2022-07-01 ENCOUNTER — CLINICAL SUPPORT (OUTPATIENT)
Dept: REHABILITATION | Facility: HOSPITAL | Age: 32
End: 2022-07-01
Payer: COMMERCIAL

## 2022-07-01 DIAGNOSIS — M25.531 PAIN IN RIGHT WRIST: Primary | ICD-10-CM

## 2022-07-01 DIAGNOSIS — M25.631 DECREASED RANGE OF MOTION OF RIGHT WRIST: ICD-10-CM

## 2022-07-01 DIAGNOSIS — Z78.9 DECREASED ACTIVITIES OF DAILY LIVING (ADL): ICD-10-CM

## 2022-07-01 PROCEDURE — 97022 WHIRLPOOL THERAPY: CPT | Mod: PN

## 2022-07-01 PROCEDURE — 97530 THERAPEUTIC ACTIVITIES: CPT | Mod: PN

## 2022-07-01 PROCEDURE — 97110 THERAPEUTIC EXERCISES: CPT | Mod: PN

## 2022-07-01 PROCEDURE — 97140 MANUAL THERAPY 1/> REGIONS: CPT | Mod: PN

## 2022-07-01 NOTE — PROGRESS NOTES
"  Occupational Therapy Updated Plan of Care & Daily Treatment Note     Name: Ani Montoya  Clinic Number: [id number]    Therapy Diagnosis:   No diagnosis found.  Physician: Freda Ravi PA-C    Visit Date: 7/1/2022  Medical Diagnosis: S52.501A (ICD-10-CM) - Closed fracture of distal end of right radius, unspecified fracture morphology, initial encounter  Physician Orders: MD ORDERS: OT/CHT Eval &Tx  Evaluation Date: 4/19/2022  Insurance Authorization Period Expiration: through 12/31/22    Updated Plan of Care from 7/1/22 to 8/1/22   Current Certification Period Expires:  6/24/22    Total Visits Received: 13  Cancelled Visits: 4  No Show Visits: 3    Surgery Date and Procedure: 7/5/22   1. Open reduction internal fixation of right 2 part intra-articular distal radius fracture.  2. Use of X-ray for fracture reduction.   Date of Return to MD: 4/21/22    Visit # / Visits authorized: 13 / 60  FOTO at visit 13: 15.8%    Time In: 4:05 pm  Time Out: 5:05 pm  Total Billable Time: 55 minutes    Precautions: Standard, Weightbearing. She is now 12 weeks post-op.  Date of Surgery: 4/7/22    Subjective     Pt reports: 7/1: Pt arrives after vacation and explains some soreness in the thumb base/radial wrist stating "it's probably because I'm using it more". Pt is still on light duty.    she was compliant with home exercise program given last session.   Response to previous treatment: No adverse reactions.  Functional change: increased flexion    Pain: 2/10 at worst with 3 jaw pinch today; pt points to 1st CMC; denies pain with lifting 5#. 0/10 at rest  Location: ulnar right wrist and forearm  right wrist, incision site.    Pronation and supination results in 4/10 pain.    Objective   Edema: Circumferential measurements: In centimters       Right Right/Left Right Right Right Right     IE- 5/2/22 5/20 6/3/22 6/14/22 7/1/22   IF P1  5.5 5.4/5.5 5.2 5.3 5.5 5.4   SF P1   4.8 4.6/4.9 4.5 4.5  4.7   Thumb P1 5.7 5.5/5.5 5.5 5.3 5.5 " "5.5   DPC 17.5/17 (L) 16.8 16.3 15.7  17.3   Wrist 15.5/13.5 (L) 15.0 14.5 14.0 14.3/14.2 14.3                 Digit and Thumb AROM (in centimeters)  Measured from tip to DPC Right Right   DATE IE-    IF  .5cm 0cm   Thumb 0 0cm             Wrist AROM    Right/Left Right Right Right Right R   DATE IE- 4/25 5/2 5/13/22 5/20 7/1/22*   EXT 50/88 50 56 67 70 70   FLX 35/90 45 58 68 80 89   RD 21/55  9 32 54 25   UD  39/57  48 56 56 52   GOOD 145/290  171 (+26) 223 (+52) 260 (+37) 236   *different jayne     Forearm AROM    Right/Left Right Right Right Right   DATE IE- 5/2/22 5/13/22 5/20/22 6/3/22   Supination 15 48 57 (+9) 68 (+11) 90   Pronation 85 77 90 (+13) = =        Strength: (measured in psi.)    Right/Left R   DATE 6/14/22 7/1/22   Seb Rung II 28/38 34 (+7)   Key Pinch 12/13 11.5 (-.5)   3-pt Pinch 8.5/13 8.0 (-.5)        Treatment     Ani received the following supervised modalities after being cleared for contradictions for 10 minutes:   - Fluidotherapy provided to increase localized blood flow which promotes tissue extensibility and healing. Also, use of fluidotherapy specifically is an additional sensory bombardment which can contribute to normalized light touch sensation at sensitive incision site on right wrist.    Ani received the following manual therapy techniques for 10 minutes:   - R/A girth  - Grade II-III Rad/Carp mobilizations  - Grade II-II Rad/Carp mobilizations with movement    Ani received therapeutic activities & exercises for 30 minutes including:  TGEs, lifts, spreads, each Wrist AROM, active extrinsic extensor and flexor lengthening 1x10 each performed during final minutes of Fluidotherapy     Hand strengthening defer   Wrist/forearm Strengthening - Green Flexbar, 1 min each:  -smiles  -frowns  -twists  -defer:   isometric wrist extension  -isometric wrist flexion  -stabilization w/oscillations  -isometric supination  -isometric pronation     Weight bearing Progression 30" x 3 " "each Stance with extended hands on tabletop, 3 ways:  - forward  - side/side  - shoulder rotation  30" x 1 each Weight shift in quadruped  - defer-Wall push ups graduating wrist position and stance; 3 min    Work simulations Defer-2 min each:  - Gentle, low-grade chest compressions in stance over foam disc  - defer- cart push/pull  - defer- patient physical assist   Objective measures 7/1/22: R/A all   HEP as upgraded 6/14: Weight bearing progression       Home Exercises and Education Provided     Education provided:   - Progress towards goals   - HEP as upgraded    Written Home Exercises Provided: Patient instructed to cont prior HEP.  Exercises were reviewed and Ani was able to demonstrate them prior to the end of the session.  Ani demonstrated good  understanding of the HEP provided.   .   See EMR under Patient Instructions for exercises provided 6/14/22.        Assessment   Reasons for Recertification of Therapy:   Pt continues to have pain with weight bearing support on her extended hand. Safe chest compression and other pushing activities at work remain limited by the pain..  7/1: Suspect 1st CMC irritation vs. Rad/carp impingement    Pt would continue to benefit from skilled OT.      Ani is progressing well towards her goals and there are no updates to goals at this time. Pt prognosis is Excellent.     Pt will continue to benefit from skilled outpatient occupational therapy to address the deficits listed in the problem list on initial evaluation provide pt/family education and to maximize pt's level of independence in the home and community environment.     Anticipated barriers to occupational therapy: None    Pt's spiritual, cultural and educational needs considered and pt agreeable to plan of care and goals.    7/1/22: Extended: Goals:  ( LTG's (6-8 weeks):  1) Increase R wrist GOOD to >90% of the L wrist to increase functional hand use for weight bearing and reaching tasks. MET 7/1/22  2) R  " strength  to be > 70 % of the unaffected side. MET 6/14/2022  3) R pinch strength to be >60% of the unaffected side. MET 6/14/2022  Extended: 4) Decrease complaints of pain to 0 out of 10 at worst to increase functional hand use and UE support functions for moderate to heavy ADL/work/leisure activities. Progressing  5) Patient to score at 30% or less on Quick/DASH and/or Work module to demonstrate improved perception of functional right hand use to evidence return to near to prior level of function for I/ADLs and work. 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 Progressing     STG's (4-5 weeks)  1) Patient to be IND with HEP and modalities for pain/edema managment. MET  2) Pt to tolerate advancing PREs and flexibility activities for weight bearing positions with self-graded intensity and effective symptom monitoring. MET  3) Increase wrist and forearm GOOD by 15 degrees to increase functional hand use and support function positions for ADLs/work/leisure activities. Met 5/13/22  4) Digit and Thumb GOOD to be WNL as compared to the unaffected side for maximizing  and fine motor skills for reactivation the hand for light ADLs. Met 5/13/22  5) Decrease edema by 1 cm for evidencing soft tissue healing and effective edema mgmt. MET     Plan   Continue as per this upgraded POC or otherwise as directed by any new MD/PA orders.    Outpatient Occupational Therapy 1 times weekly for 4 weeks to include the following interventions: Fluidotherapy, Manual therapy/joint mobilizations, Therapeutic exercises/activities., Strengthening and Joint Protection.    ROSEMARIE Ellis, CHANOT  Occupational Therapist, Certified Hand Therapist

## 2022-07-01 NOTE — PLAN OF CARE
"  Occupational Therapy Updated Plan of Care & Daily Treatment Note     Name: Ani Montoya  Clinic Number: [id number]    Therapy Diagnosis:   No diagnosis found.  Physician: Freda Ravi PA-C    Visit Date: 7/1/2022  Medical Diagnosis: S52.501A (ICD-10-CM) - Closed fracture of distal end of right radius, unspecified fracture morphology, initial encounter  Physician Orders: MD ORDERS: OT/CHT Eval &Tx  Evaluation Date: 4/19/2022  Insurance Authorization Period Expiration: through 12/31/22    Updated Plan of Care from 7/1/22 to 8/1/22   Current Certification Period Expires:  6/24/22    Total Visits Received: 13  Cancelled Visits: 4  No Show Visits: 3    Surgery Date and Procedure: 7/5/22   1. Open reduction internal fixation of right 2 part intra-articular distal radius fracture.  2. Use of X-ray for fracture reduction.   Date of Return to MD: 4/21/22    Visit # / Visits authorized: 13 / 60  FOTO at visit 13: 15.8%    Time In: 4:05 pm  Time Out: 5:05 pm  Total Billable Time: 55 minutes    Precautions: Standard, Weightbearing. She is now 12 weeks post-op.  Date of Surgery: 4/7/22    Subjective     Pt reports: 7/1: Pt arrives after vacation and explains some soreness in the thumb base/radial wrist stating "it's probably because I'm using it more". Pt is still on light duty.    she was compliant with home exercise program given last session.   Response to previous treatment: No adverse reactions.  Functional change: increased flexion    Pain: 2/10 at worst with 3 jaw pinch today; pt points to 1st CMC; denies pain with lifting 5#. 0/10 at rest  Location: ulnar right wrist and forearm  right wrist, incision site.    Pronation and supination results in 4/10 pain.    Objective   Edema: Circumferential measurements: In centimters       Right Right/Left Right Right Right Right     IE- 5/2/22 5/20 6/3/22 6/14/22 7/1/22   IF P1  5.5 5.4/5.5 5.2 5.3 5.5 5.4   SF P1   4.8 4.6/4.9 4.5 4.5  4.7   Thumb P1 5.7 5.5/5.5 5.5 5.3 5.5 " "5.5   DPC 17.5/17 (L) 16.8 16.3 15.7  17.3   Wrist 15.5/13.5 (L) 15.0 14.5 14.0 14.3/14.2 14.3                 Digit and Thumb AROM (in centimeters)  Measured from tip to DPC Right Right   DATE IE-    IF  .5cm 0cm   Thumb 0 0cm             Wrist AROM    Right/Left Right Right Right Right R   DATE IE- 4/25 5/2 5/13/22 5/20 7/1/22*   EXT 50/88 50 56 67 70 70   FLX 35/90 45 58 68 80 89   RD 21/55  9 32 54 25   UD  39/57  48 56 56 52   GOOD 145/290  171 (+26) 223 (+52) 260 (+37) 236   *different jayne     Forearm AROM    Right/Left Right Right Right Right   DATE IE- 5/2/22 5/13/22 5/20/22 6/3/22   Supination 15 48 57 (+9) 68 (+11) 90   Pronation 85 77 90 (+13) = =        Strength: (measured in psi.)    Right/Left R   DATE 6/14/22 7/1/22   Seb Rung II 28/38 34 (+7)   Key Pinch 12/13 11.5 (-.5)   3-pt Pinch 8.5/13 8.0 (-.5)        Treatment     Ani received the following supervised modalities after being cleared for contradictions for 10 minutes:   - Fluidotherapy provided to increase localized blood flow which promotes tissue extensibility and healing. Also, use of fluidotherapy specifically is an additional sensory bombardment which can contribute to normalized light touch sensation at sensitive incision site on right wrist.    Ani received the following manual therapy techniques for 10 minutes:   - R/A girth  - Grade II-III Rad/Carp mobilizations  - Grade II-II Rad/Carp mobilizations with movement    Ani received therapeutic activities & exercises for 30 minutes including:  TGEs, lifts, spreads, each Wrist AROM, active extrinsic extensor and flexor lengthening 1x10 each performed during final minutes of Fluidotherapy     Hand strengthening defer   Wrist/forearm Strengthening - Green Flexbar, 1 min each:  -smiles  -frowns  -twists  -defer:   isometric wrist extension  -isometric wrist flexion  -stabilization w/oscillations  -isometric supination  -isometric pronation     Weight bearing Progression 30" x 3 " "each Stance with extended hands on tabletop, 3 ways:  - forward  - side/side  - shoulder rotation  30" x 1 each Weight shift in quadruped  - defer-Wall push ups graduating wrist position and stance; 3 min    Work simulations Defer-2 min each:  - Gentle, low-grade chest compressions in stance over foam disc  - defer- cart push/pull  - defer- patient physical assist   Objective measures 7/1/22: R/A all   HEP as upgraded 6/14: Weight bearing progression       Home Exercises and Education Provided     Education provided:   - Progress towards goals   - HEP as upgraded    Written Home Exercises Provided: Patient instructed to cont prior HEP.  Exercises were reviewed and Ani was able to demonstrate them prior to the end of the session.  Ani demonstrated good  understanding of the HEP provided.   .   See EMR under Patient Instructions for exercises provided 6/14/22.        Assessment   Reasons for Recertification of Therapy:   Pt continues to have pain with weight bearing support on her extended hand. Safe chest compression and other pushing activities at work remain limited by the pain..  7/1: Suspect 1st CMC irritation vs. Rad/carp impingement    Pt would continue to benefit from skilled OT.      Ani is progressing well towards her goals and there are no updates to goals at this time. Pt prognosis is Excellent.     Pt will continue to benefit from skilled outpatient occupational therapy to address the deficits listed in the problem list on initial evaluation provide pt/family education and to maximize pt's level of independence in the home and community environment.     Anticipated barriers to occupational therapy: None    Pt's spiritual, cultural and educational needs considered and pt agreeable to plan of care and goals.    7/1/22: Extended: Goals:  ( LTG's (6-8 weeks):  1) Increase R wrist GOOD to >90% of the L wrist to increase functional hand use for weight bearing and reaching tasks. MET 7/1/22  2) R  " strength  to be > 70 % of the unaffected side. MET 6/14/2022  3) R pinch strength to be >60% of the unaffected side. MET 6/14/2022  Extended: 4) Decrease complaints of pain to 0 out of 10 at worst to increase functional hand use and UE support functions for moderate to heavy ADL/work/leisure activities. Progressing  5) Patient to score at 30% or less on Quick/DASH and/or Work module to demonstrate improved perception of functional right hand use to evidence return to near to prior level of function for I/ADLs and work. 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 Progressing     STG's (4-5 weeks)  1) Patient to be IND with HEP and modalities for pain/edema managment. MET  2) Pt to tolerate advancing PREs and flexibility activities for weight bearing positions with self-graded intensity and effective symptom monitoring. MET  3) Increase wrist and forearm GOOD by 15 degrees to increase functional hand use and support function positions for ADLs/work/leisure activities. Met 5/13/22  4) Digit and Thumb GOOD to be WNL as compared to the unaffected side for maximizing  and fine motor skills for reactivation the hand for light ADLs. Met 5/13/22  5) Decrease edema by 1 cm for evidencing soft tissue healing and effective edema mgmt. MET     Plan   Continue as per this upgraded POC or otherwise as directed by any new MD/PA orders.    Outpatient Occupational Therapy 1 times weekly for 4 weeks to include the following interventions: Fluidotherapy, Manual therapy/joint mobilizations, Therapeutic exercises/activities., Strengthening and Joint Protection.    ROSEMARIE Ellis, JONO  Occupational Therapist, Certified Hand Therapist      I CERTIFY THE NEED FOR THESE SERVICES FURNISHED UNDER THIS PLAN OF TREATMENT AND WHILE UNDER MY CARE    Physician's comments:        Physician's Signature:  ___________________________________________________

## 2022-07-05 ENCOUNTER — OFFICE VISIT (OUTPATIENT)
Dept: ORTHOPEDICS | Facility: CLINIC | Age: 32
End: 2022-07-05
Payer: COMMERCIAL

## 2022-07-05 ENCOUNTER — HOSPITAL ENCOUNTER (OUTPATIENT)
Dept: RADIOLOGY | Facility: HOSPITAL | Age: 32
Discharge: HOME | End: 2022-07-05
Attending: ORTHOPAEDIC SURGERY
Payer: COMMERCIAL

## 2022-07-05 VITALS — BODY MASS INDEX: 22.18 KG/M2 | HEIGHT: 59 IN | WEIGHT: 110 LBS

## 2022-07-05 DIAGNOSIS — S52.551D OTHER CLOSED EXTRA-ARTICULAR FRACTURE OF DISTAL END OF RIGHT RADIUS WITH ROUTINE HEALING, SUBSEQUENT ENCOUNTER: ICD-10-CM

## 2022-07-05 DIAGNOSIS — S52.551D OTHER CLOSED EXTRA-ARTICULAR FRACTURE OF DISTAL END OF RIGHT RADIUS WITH ROUTINE HEALING, SUBSEQUENT ENCOUNTER: Primary | ICD-10-CM

## 2022-07-05 DIAGNOSIS — M18.11 PRIMARY OSTEOARTHRITIS OF FIRST CARPOMETACARPAL JOINT OF RIGHT HAND: ICD-10-CM

## 2022-07-05 DIAGNOSIS — M79.644 PAIN OF RIGHT THUMB: ICD-10-CM

## 2022-07-05 PROCEDURE — 1159F PR MEDICATION LIST DOCUMENTED IN MEDICAL RECORD: ICD-10-PCS | Mod: CPTII,S$GLB,, | Performed by: ORTHOPAEDIC SURGERY

## 2022-07-05 PROCEDURE — 73110 X-RAY EXAM OF WRIST: CPT | Mod: TC,RT

## 2022-07-05 PROCEDURE — 1159F MED LIST DOCD IN RCRD: CPT | Mod: CPTII,S$GLB,, | Performed by: ORTHOPAEDIC SURGERY

## 2022-07-05 PROCEDURE — 1160F RVW MEDS BY RX/DR IN RCRD: CPT | Mod: CPTII,S$GLB,, | Performed by: ORTHOPAEDIC SURGERY

## 2022-07-05 PROCEDURE — 99999 PR PBB SHADOW E&M-EST. PATIENT-LVL III: CPT | Mod: PBBFAC,,, | Performed by: ORTHOPAEDIC SURGERY

## 2022-07-05 PROCEDURE — 99024 POSTOP FOLLOW-UP VISIT: CPT | Mod: S$GLB,,, | Performed by: ORTHOPAEDIC SURGERY

## 2022-07-05 PROCEDURE — 99999 PR PBB SHADOW E&M-EST. PATIENT-LVL III: ICD-10-PCS | Mod: PBBFAC,,, | Performed by: ORTHOPAEDIC SURGERY

## 2022-07-05 PROCEDURE — 99024 PR POST-OP FOLLOW-UP VISIT: ICD-10-PCS | Mod: S$GLB,,, | Performed by: ORTHOPAEDIC SURGERY

## 2022-07-05 PROCEDURE — 73110 X-RAY EXAM OF WRIST: CPT | Mod: 26,RT,, | Performed by: RADIOLOGY

## 2022-07-05 PROCEDURE — 3008F BODY MASS INDEX DOCD: CPT | Mod: CPTII,S$GLB,, | Performed by: ORTHOPAEDIC SURGERY

## 2022-07-05 PROCEDURE — 73110 XR WRIST COMPLETE 3 VIEWS RIGHT: ICD-10-PCS | Mod: 26,RT,, | Performed by: RADIOLOGY

## 2022-07-05 PROCEDURE — 99213 OFFICE O/P EST LOW 20 MIN: CPT | Mod: 24,S$GLB,, | Performed by: ORTHOPAEDIC SURGERY

## 2022-07-05 PROCEDURE — 1160F PR REVIEW ALL MEDS BY PRESCRIBER/CLIN PHARMACIST DOCUMENTED: ICD-10-PCS | Mod: CPTII,S$GLB,, | Performed by: ORTHOPAEDIC SURGERY

## 2022-07-05 PROCEDURE — 99213 PR OFFICE/OUTPT VISIT, EST, LEVL III, 20-29 MIN: ICD-10-PCS | Mod: 24,S$GLB,, | Performed by: ORTHOPAEDIC SURGERY

## 2022-07-05 PROCEDURE — 3008F PR BODY MASS INDEX (BMI) DOCUMENTED: ICD-10-PCS | Mod: CPTII,S$GLB,, | Performed by: ORTHOPAEDIC SURGERY

## 2022-07-05 NOTE — PROGRESS NOTES
Ani Montoya presents for post-operative evaluation of   Encounter Diagnoses   Name Primary?    Other closed extra-articular fracture of distal end of right radius with routine healing, subsequent encounter Yes    Pain of right thumb     Primary osteoarthritis of first carpometacarpal joint of right hand    The patient is now 12 weeks s/p ORIF of right wrist.  Overall the patient reports doing well. She has great ROM and her  strength is 34 lbs on the right.    She has been having new right basilar thumb pain over the past month. She denies any trauma. She reports pain with pinching and gripping at the thumb CMC joint.       PE:    AA&O x 4.  NAD  HEENT:  NCAT, sclera nonicteric  Lungs:  Respirations are equal and unlabored.  CV:  2+ bilateral upper and lower extremity pulses.  MSK: The incision is well healed.  Full wrist and finger motion.  Neurovascularly intact and has 5/5 thenar and intrinsic musculature strength.   + grind test and telescoping pain right thumb CMC joint, increased thumb CMC ROM     X-rays AP, lateral and oblique right wrist taken today are independently reviewed by me and shows a well healed right distal radius fracture with no change in hardware alignment and bilateral Eaton stage I basilar thumb arthritis.       A/P: Status post right distal radius ORIF, new right basilar thumb osteoarthritis    1) Continue with range of motion and strengthening; she can return to full duty work without restrictions 08/01/2022.  2) We have discussed the natural history of basilar thumb arthritis including treatment options such as splinting, oral and topical anti-inflammatories, cortisone injections and surgery. I have given her a right thumb 3D braces for comfort.    3) F/U as needed for any worsening of symptoms  4) Call with any questions/concerns in the interim        Tari Adhikari MD     Please be aware that this note has been generated with the assistance of [employer] voice-to-text.  Please  excuse any spelling or grammatical errors.

## 2022-07-06 ENCOUNTER — TELEPHONE (OUTPATIENT)
Dept: REHABILITATION | Facility: HOSPITAL | Age: 32
End: 2022-07-06
Payer: COMMERCIAL

## 2022-07-13 ENCOUNTER — CLINICAL SUPPORT (OUTPATIENT)
Dept: REHABILITATION | Facility: HOSPITAL | Age: 32
End: 2022-07-13
Payer: COMMERCIAL

## 2022-07-13 DIAGNOSIS — M25.531 PAIN IN RIGHT WRIST: Primary | ICD-10-CM

## 2022-07-13 DIAGNOSIS — M25.631 DECREASED RANGE OF MOTION OF RIGHT WRIST: ICD-10-CM

## 2022-07-13 DIAGNOSIS — Z78.9 DECREASED ACTIVITIES OF DAILY LIVING (ADL): ICD-10-CM

## 2022-07-13 PROCEDURE — 97140 MANUAL THERAPY 1/> REGIONS: CPT | Mod: PN

## 2022-07-13 PROCEDURE — 97022 WHIRLPOOL THERAPY: CPT | Mod: PN

## 2022-07-13 PROCEDURE — 97110 THERAPEUTIC EXERCISES: CPT | Mod: PN

## 2022-07-13 NOTE — PROGRESS NOTES
Occupational Therapy Updated Plan of Care & Daily Treatment Note     Name: Ani Montoya  Clinic Number: [id number]    Therapy Diagnosis:   Encounter Diagnoses   Name Primary?    Pain in right wrist Yes    Decreased range of motion of right wrist     Decreased activities of daily living (ADL)      Physician: Freda Ravi PA-C    Visit Date: 7/13/2022  Medical Diagnosis: S52.501A (ICD-10-CM) - Closed fracture of distal end of right radius, unspecified fracture morphology, initial encounter  Physician Orders: MD ORDERS: OT/CHT Eval &Tx  Evaluation Date: 4/19/2022  Insurance Authorization Period Expiration: through 12/31/22    Updated Plan of Care from 7/1/22 to 8/1/22   Current Certification Period Expires:  6/24/22    Total Visits Received: 14  Cancelled Visits: 4  No Show Visits: 3    Surgery Date and Procedure: 7/5/22   1. Open reduction internal fixation of right 2 part intra-articular distal radius fracture.  2. Use of X-ray for fracture reduction.   Date of Return to MD:   7/5/22  1) Continue with range of motion and strengthening; she can return to full duty work without restrictions 08/01/2022.    Visit # / Visits authorized: 14 / 60  FOTO at visit 13: 15.8%    Time In: 1008  Time Out: 1045  Total Billable Time: 37 minutes    Precautions: Standard, Weightbearing. She is now 12 weeks post-op.  Date of Surgery: 4/7/22    Subjective     Pt reports: 7/13: Patient feeling well. Having a typical day but with CMC pain.  she was compliant with home exercise program given last session.   Response to previous treatment: No adverse reactions.  Functional change: increased flexion    Pain: 2/10 at worst with 3 jaw pinch today; pt points to 1st CMC; denies pain with lifting 5#. 0/10 at rest  Location: ulnar right wrist and forearm  right wrist, incision site.    Pronation and supination results in 4/10 pain.    Objective   Edema: Circumferential measurements: In centimters       Right Right/Left Right Right Right  Right Right     IE- 5/2/22 5/20 6/3/22 6/14/22 7/1/22 7/13/22   IF P1  5.5 5.4/5.5 5.2 5.3 5.5 5.4 5.2   SF P1   4.8 4.6/4.9 4.5 4.5  4.7 4.5   Thumb P1 5.7 5.5/5.5 5.5 5.3 5.5 5.5 5.3   DPC 17.5/17 (L) 16.8 16.3 15.7  17.3 16.5   Wrist 15.5/13.5 (L) 15.0 14.5 14.0 14.3/14.2 14.3 14.0                  Digit and Thumb AROM (in centimeters)  Measured from tip to DPC Right Right   DATE IE-    IF  .5cm 0cm   Thumb 0 0cm             Wrist AROM    Right/Left Right Right Right Right R R   DATE IE- 4/25 5/2 5/13/22 5/20 7/1/22* 7/13/22   EXT 50/88 50 56 67 70 70 74   FLX 35/90 45 58 68 80 89 90   RD 21/55  9 32 54 25 25   UD  39/57  48 56 56 52 50   GOOD 145/290  171 (+26) 223 (+52) 260 (+37) 236 239 (+3)   *different jayne     Forearm AROM    Right/Left Right Right Right Right   DATE IE- 5/2/22 5/13/22 5/20/22 6/3/22   Supination 15 48 57 (+9) 68 (+11) 90   Pronation 85 77 90 (+13) = =        Strength: (measured in psi.)    Right/Left R R   DATE 6/14/22 7/1/22 7/13   Seb Rung II 28/38 34 (+7) 35 (+1)   Key Pinch 12/13 11.5 (-.5) 12   3-pt Pinch 8.5/13 8.0 (-.5) 11        Treatment     Ani received the following supervised modalities after being cleared for contradictions for 8 minutes:   - Fluidotherapy provided to increase localized blood flow which promotes tissue extensibility and healing. Also, use of fluidotherapy specifically is an additional sensory bombardment which can contribute to normalized light touch sensation at sensitive incision site on right wrist.    Ani received the following manual therapy techniques for 8 minutes:   - R/A girth  defer- Grade II-III Rad/Carp mobilizations  Defer- Grade II-II Rad/Carp mobilizations with movement    Ani received therapeutic activities & exercises for 23 minutes including:  TGEs, lifts, spreads, each Wrist AROM, active extrinsic extensor and flexor lengthening 1x10 each performed during final minutes of Fluidotherapy     Hand strengthening defer  "  Wrist/forearm Strengthening Defer  - Green Flexbar, 1 min each:  -smiles  -frowns  -twists  -defer:   isometric wrist extension  -isometric wrist flexion  -stabilization w/oscillations  -isometric supination  -isometric pronation     Weight bearing Progression Defer  30" x 3 each Stance with extended hands on tabletop, 3 ways:  - forward  - side/side  - shoulder rotation  30" x 1 each Weight shift in quadruped  - defer-Wall push ups graduating wrist position and stance; 3 min    Work simulations - chest compressions in stance over inflatable disc - 1 min 30s  - defer- cart push/pull  - patient physical assist with return demo of good body mechanics using two staff technique dragging kettlebell up the mat. - 10 minutes   Objective measures 7/13/22, 7/1/22: R/A all   HEP as upgraded 6/14: Weight bearing progression       Home Exercises and Education Provided     Education provided:   - Progress towards goals   - HEP as upgraded    Written Home Exercises Provided: Patient instructed to cont prior HEP.  Exercises were reviewed and Ani was able to demonstrate them prior to the end of the session.  Ani demonstrated good  understanding of the HEP provided.   .   See EMR under Patient Instructions for exercises provided 6/14/22.        Assessment   7/13/22: improvements in simulated chest compression tolerance and hand swelling noted.  Pt would continue to benefit from skilled OT.      Ani is progressing well towards her goals and there are no updates to goals at this time. Pt prognosis is Excellent.     Pt will continue to benefit from skilled outpatient occupational therapy to address the deficits listed in the problem list on initial evaluation provide pt/family education and to maximize pt's level of independence in the home and community environment.     Anticipated barriers to occupational therapy: None    Pt's spiritual, cultural and educational needs considered and pt agreeable to plan of care and " goals.    7/1/22: Extended: Goals:  ( LTG's (6-8 weeks):  1) Increase R wrist GOOD to >90% of the L wrist to increase functional hand use for weight bearing and reaching tasks. MET 7/1/22  2) R  strength  to be > 70 % of the unaffected side. MET 6/14/2022  3) R pinch strength to be >60% of the unaffected side. MET 6/14/2022  Extended: 4) Decrease complaints of pain to 0 out of 10 at worst to increase functional hand use and UE support functions for moderate to heavy ADL/work/leisure activities. Progressing  5) Patient to score at 30% or less on Quick/DASH and/or Work module to demonstrate improved perception of functional right hand use to evidence return to near to prior level of function for I/ADLs and work. 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 Progressing     STG's (4-5 weeks)  1) Patient to be IND with HEP and modalities for pain/edema managment. MET  2) Pt to tolerate advancing PREs and flexibility activities for weight bearing positions with self-graded intensity and effective symptom monitoring. MET  3) Increase wrist and forearm GOOD by 15 degrees to increase functional hand use and support function positions for ADLs/work/leisure activities. Met 5/13/22  4) Digit and Thumb GOOD to be WNL as compared to the unaffected side for maximizing  and fine motor skills for reactivation the hand for light ADLs. Met 5/13/22  5) Decrease edema by 1 cm for evidencing soft tissue healing and effective edema mgmt. MET     Plan   Continue as per this upgraded POC or otherwise as directed by any new MD/PA orders.    Outpatient Occupational Therapy 1 times weekly for 4 weeks to include the following interventions: Fluidotherapy, Manual therapy/joint mobilizations, Therapeutic exercises/activities., Strengthening and Joint Protection.    Yannick Burnett, OTR/L  Occupational Therapist

## 2022-08-03 ENCOUNTER — TELEPHONE (OUTPATIENT)
Dept: REHABILITATION | Facility: HOSPITAL | Age: 32
End: 2022-08-03
Payer: COMMERCIAL

## 2022-08-03 NOTE — TELEPHONE ENCOUNTER
OT left a voice message concerning missed appt this morning.  OT requested that she call to confirm if she will be continuing with OT since she has been released to full duty work.  OT explained next appt scheduled is on 8/10 and that she is scheduled through August.    ROSEMARIE Ellis, CHANOT  Occupational Therapist, Certified Hand Therapist

## 2023-04-26 DIAGNOSIS — N91.2 AMENORRHEA: ICD-10-CM

## 2023-04-26 DIAGNOSIS — E23.0 HYPOPITUITARISM: ICD-10-CM

## 2023-04-27 RX ORDER — PROGESTERONE 100 MG/1
100 CAPSULE ORAL NIGHTLY
Qty: 12 CAPSULE | Refills: 0 | Status: SHIPPED | OUTPATIENT
Start: 2023-04-27 | End: 2023-09-12 | Stop reason: SDUPTHER

## 2023-04-27 RX ORDER — ESTRADIOL 0.5 MG/1
0.5 TABLET ORAL DAILY
Qty: 90 TABLET | Refills: 0 | Status: SHIPPED | OUTPATIENT
Start: 2023-04-27 | End: 2023-09-12 | Stop reason: SDUPTHER

## 2023-05-26 ENCOUNTER — OFFICE VISIT (OUTPATIENT)
Dept: OBSTETRICS AND GYNECOLOGY | Facility: CLINIC | Age: 33
End: 2023-05-26
Payer: COMMERCIAL

## 2023-05-26 ENCOUNTER — TELEPHONE (OUTPATIENT)
Dept: OBSTETRICS AND GYNECOLOGY | Facility: CLINIC | Age: 33
End: 2023-05-26

## 2023-05-26 VITALS
DIASTOLIC BLOOD PRESSURE: 64 MMHG | BODY MASS INDEX: 21.16 KG/M2 | RESPIRATION RATE: 14 BRPM | WEIGHT: 104.94 LBS | HEIGHT: 59 IN | SYSTOLIC BLOOD PRESSURE: 108 MMHG

## 2023-05-26 DIAGNOSIS — Z11.3 SCREEN FOR STD (SEXUALLY TRANSMITTED DISEASE): ICD-10-CM

## 2023-05-26 DIAGNOSIS — E22.1 HYPERPROLACTINEMIA: ICD-10-CM

## 2023-05-26 DIAGNOSIS — N91.0 PRIMARY AMENORRHEA: ICD-10-CM

## 2023-05-26 DIAGNOSIS — Z01.419 ENCOUNTER FOR ANNUAL ROUTINE GYNECOLOGICAL EXAMINATION: Primary | ICD-10-CM

## 2023-05-26 PROCEDURE — 1159F PR MEDICATION LIST DOCUMENTED IN MEDICAL RECORD: ICD-10-PCS | Mod: CPTII,S$GLB,, | Performed by: OBSTETRICS & GYNECOLOGY

## 2023-05-26 PROCEDURE — 3078F PR MOST RECENT DIASTOLIC BLOOD PRESSURE < 80 MM HG: ICD-10-PCS | Mod: CPTII,S$GLB,, | Performed by: OBSTETRICS & GYNECOLOGY

## 2023-05-26 PROCEDURE — 3074F PR MOST RECENT SYSTOLIC BLOOD PRESSURE < 130 MM HG: ICD-10-PCS | Mod: CPTII,S$GLB,, | Performed by: OBSTETRICS & GYNECOLOGY

## 2023-05-26 PROCEDURE — 99999 PR PBB SHADOW E&M-EST. PATIENT-LVL IV: CPT | Mod: PBBFAC,,, | Performed by: OBSTETRICS & GYNECOLOGY

## 2023-05-26 PROCEDURE — 99395 PREV VISIT EST AGE 18-39: CPT | Mod: S$GLB,,, | Performed by: OBSTETRICS & GYNECOLOGY

## 2023-05-26 PROCEDURE — 3078F DIAST BP <80 MM HG: CPT | Mod: CPTII,S$GLB,, | Performed by: OBSTETRICS & GYNECOLOGY

## 2023-05-26 PROCEDURE — 1160F RVW MEDS BY RX/DR IN RCRD: CPT | Mod: CPTII,S$GLB,, | Performed by: OBSTETRICS & GYNECOLOGY

## 2023-05-26 PROCEDURE — 87591 N.GONORRHOEAE DNA AMP PROB: CPT | Performed by: OBSTETRICS & GYNECOLOGY

## 2023-05-26 PROCEDURE — 3074F SYST BP LT 130 MM HG: CPT | Mod: CPTII,S$GLB,, | Performed by: OBSTETRICS & GYNECOLOGY

## 2023-05-26 PROCEDURE — 99999 PR PBB SHADOW E&M-EST. PATIENT-LVL IV: ICD-10-PCS | Mod: PBBFAC,,, | Performed by: OBSTETRICS & GYNECOLOGY

## 2023-05-26 PROCEDURE — 1160F PR REVIEW ALL MEDS BY PRESCRIBER/CLIN PHARMACIST DOCUMENTED: ICD-10-PCS | Mod: CPTII,S$GLB,, | Performed by: OBSTETRICS & GYNECOLOGY

## 2023-05-26 PROCEDURE — 3008F PR BODY MASS INDEX (BMI) DOCUMENTED: ICD-10-PCS | Mod: CPTII,S$GLB,, | Performed by: OBSTETRICS & GYNECOLOGY

## 2023-05-26 PROCEDURE — 99395 PR PREVENTIVE VISIT,EST,18-39: ICD-10-PCS | Mod: S$GLB,,, | Performed by: OBSTETRICS & GYNECOLOGY

## 2023-05-26 PROCEDURE — 1159F MED LIST DOCD IN RCRD: CPT | Mod: CPTII,S$GLB,, | Performed by: OBSTETRICS & GYNECOLOGY

## 2023-05-26 PROCEDURE — 3008F BODY MASS INDEX DOCD: CPT | Mod: CPTII,S$GLB,, | Performed by: OBSTETRICS & GYNECOLOGY

## 2023-05-26 PROCEDURE — 88175 CYTOPATH C/V AUTO FLUID REDO: CPT | Performed by: OBSTETRICS & GYNECOLOGY

## 2023-05-26 NOTE — TELEPHONE ENCOUNTER
----- Message from Dotty Valdez sent at 5/26/2023  7:21 AM CDT -----  Contact: Patient  Type:  Patient Needs Advice    Who Called:  Patient  What is this regarding?:  Pt is running late to her appt and would like to speak with someone about it.  Best Call Back Number:  [phone]  Additional Information:  Please call the patient back at the phone number listed above to advise. Thank you!

## 2023-05-26 NOTE — PROGRESS NOTES
Chief Complaint   Patient presents with    Well Woman     No problems        History and Physical:  No LMP recorded. (Menstrual status: Other).       Ani Montoya is a 33 y.o.  F who presents today for her routine annual GYN exam. The patient has no Gynecology complaints today.       Allergies: Review of patient's allergies indicates:  No Known Allergies    Past Medical History:   Diagnosis Date    Hypopituitarism     Osteoporosis     Primary amenorrhea        Past Surgical History:   Procedure Laterality Date    breast aug Bilateral 2012    OPEN REDUCTION AND INTERNAL FIXATION (ORIF) OF FRACTURE OF TIBIAL PLATEAU Right 2020    Procedure: ORIF, FRACTURE, TIBIA, PLATEAU;  Surgeon: Jordy Ortega MD;  Location: [address];  Service: Orthopedics;  Laterality: Right;    OPEN REDUCTION AND INTERNAL FIXATION (ORIF) OF INJURY OF WRIST Right 2022    Procedure: ORIF, WRIST mini C arm, med artist;  Surgeon: Tari Adhikari MD;  Location: [facility];  Service: Orthopedics;  Laterality: Right;       MEDS:   Current Outpatient Medications on File Prior to Visit   Medication Sig Dispense Refill    cholecalciferol, vitamin D3, (VITAMIN D3) 25 mcg (1,000 unit) capsule Take 1 capsule (1,000 Units total) by mouth once daily. 90 capsule 3    estradioL (ESTRACE) 0.5 MG tablet Take 1 tablet (0.5 mg total) by mouth once daily. 90 tablet 0    progesterone (PROMETRIUM) 100 MG capsule Take 1 capsule (100 mg total) by mouth nightly. 12 capsule 0    amitriptyline (ELAVIL) 25 MG tablet Take 1 tablet (25 mg total) by mouth nightly as needed for Insomnia or Pain. (Patient not taking: Reported on 2021) 30 tablet 0    calcium-vitamin D3 (OS-IMCAH 500 + D3) 500 mg(1,250mg) -200 unit per tablet Take 1 tablet by mouth 2 (two) times daily with meals. 60 tablet 11    cholecalciferol, vitamin D3, (VITAMIN D3) 25 mcg (1,000 unit) capsule Take 1 capsule (1,000 Units total) by mouth once daily. (Patient not taking: Reported on  2023) 30 capsule 11    ergocalciferol (ERGOCALCIFEROL) 50,000 unit Cap Take 1 capsule (50,000 Units total) by mouth every 7 days. (Patient not taking: Reported on 2021) 12 capsule 1    ibuprofen (ADVIL,MOTRIN) 600 MG tablet Take 1 tablet (600 mg total) by mouth every 6 (six) hours as needed for Pain. 30 tablet 0    medroxyPROGESTERone (PROVERA) 10 MG tablet Take 0.5 tablets (5 mg total) by mouth once daily. 90 tablet 3    naproxen (NAPROSYN) 500 MG tablet Take 1 tablet (500 mg total) by mouth 2 (two) times daily with meals. (Patient not taking: Reported on 2022) 14 tablet 0    oxyCODONE-acetaminophen (PERCOCET) 5-325 mg per tablet Take 1 tablet by mouth every 6 (six) hours as needed for Pain. (Patient not taking: Reported on 2022) 15 tablet 0    promethazine (PHENERGAN) 25 MG tablet Take 1 tablet (25 mg total) by mouth every 6 (six) hours as needed for Nausea. (Patient not taking: Reported on 2022) 15 tablet 0    somatropin (NORDITROPIN FLEXPRO) 5 mg/1.5 mL (3.3 mg/mL) PnIj Inject 0.2 mg into the skin once daily. 1.5 mL 6     Current Facility-Administered Medications on File Prior to Visit   Medication Dose Route Frequency Provider Last Rate Last Admin    fentaNYL 50 mcg/mL injection 25 mcg  25 mcg Intravenous Q5 Min PRN Abhishek Don MD        midazolam (VERSED) 1 mg/mL injection 0.5 mg  0.5 mg Intravenous PRN Abhishek Don MD   4 mg at 22 0719       OB History          0    Para   0    Term   0       0    AB   0    Living   0         SAB   0    IAB   0    Ectopic   0    Multiple   0    Live Births                     Social History     Socioeconomic History    Marital status: Single   Tobacco Use    Smoking status: Never    Smokeless tobacco: Never   Substance and Sexual Activity    Alcohol use: Yes     Comment: occ    Drug use: No    Sexual activity: Yes     Partners: Male       Family History   Problem Relation Age of Onset    Cancer Maternal Grandfather          "unknown    Cancer Cousin         brain    Short stature Maternal Grandmother         4.11`    Breast cancer Neg Hx     Ovarian cancer Neg Hx          Past medical and surgical history reviewed.   I have reviewed the patient's medical history in detail and updated the computerized patient record.        Review of System:   General: no chills, fever, night sweats, weight gain or weight loss  Psychological: no depression or suicidal ideation  Breasts: no new or changing breast lumps, nipple discharge or masses.  Respiratory: no cough, shortness of breath, or wheezing  Cardiovascular: no chest pain or dyspnea on exertion  Gastrointestinal: no abdominal pain, change in bowel habits, or black or bloody stools  Genito-Urinary: no incontinence, urinary frequency/urgency or vulvar/vaginal symptoms, pelvic pain or abnormal vaginal bleeding.  Musculoskeletal: no gait disturbance or muscular weakness      Physical Exam:   /64   Resp 14   Ht 4' 11" (1.499 m)   Wt 47.6 kg (104 lb 15 oz)   BMI 21.20 kg/m²   Constitutional: She appears alert and responsive. She appears well-developed, well-groomed, and well-nourished. No distress.   HENT:   Head: Normocephalic and atraumatic.   Eyes: Conjunctivae and EOM are normal. No scleral icterus.   Neck: Symmetrical. Normal range of motion. Neck supple. No tracheal deviation present. THYROID:  without masses or tenderness.  Cardiovascular: Normal rate, no rhythm abnormality noted. Extremities without swelling or edema, warm.    Pulmonary/Chest: Normal respiratory Effort. No distress or retractions. She exhibits no tenderness.  Breasts: are symmetrical.no masses, bilat implants   Right breast exhibits no inverted nipple, no mass, no nipple discharge, no skin change and no tenderness.   Left breast exhibits no inverted nipple, no mass, no nipple discharge, no skin change and no tenderness.  Abdominal: Soft. She exhibits no distension, hernias or masses. There is no tenderness. No " enlargement of liver edge or spleen.  There is no rebound and no guarding.   Genitourinary:    External rectal exam shows no thrombosed external hemorrhoids, no lesions.     Pelvic exam was performed with patient supine.   No labial fusion, and symmetrical.    There is no rash, lesion or injury on the right labia.   There is no rash, lesion or injury on the left labia.   No bleeding and no signs of injury around the vaginal introitus, urethral meatus is normal size and without prolapse or lesions, urethra well supported. The cervix is visualized with no discharge, lesions or friability.   No vaginal discharge found.    No significant Cystocele, Enterocele or rectocele, and cervix and uterus well supported.   Bimanual exam:   The urethra is normal to palpation and there are no palpable vaginal wall masses.   Uterus is not deviated, not enlarged, not fixed, normal shape and not tender.   Cervix exhibits no motion tenderness.    Right adnexum displays no mass or nodularity and no tenderness.   Left adnexum displays no mass or nodularity and no tenderness.  Musculoskeletal: Normal range of motion.   Lymphadenopathy: No inguinal adenopathy present.   Neurological: She is alert and oriented to person, place, and time. Coordination normal.   Skin: Skin is warm and dry. She is not diaphoretic. No rashes, lesions or ulcers.   Psychiatric: She has a normal mood and affect, oriented to person, place, and time.      Assessment:   Normal annual GYN exam  1. Encounter for annual routine gynecological examination  Liquid-Based Pap Smear, Screening      2. Primary amenorrhea        3. Hyperprolactinemia        On HRT      Plan:   PAP  GC, CT test  Rec see Endocrinol.  Follow up in 1 year.  Patient informed will be contacted with results within 2 weeks. Encouraged to please call back or email if she has not heard from us by then.

## 2023-05-29 LAB
C TRACH DNA SPEC QL NAA+PROBE: NOT DETECTED
N GONORRHOEA DNA SPEC QL NAA+PROBE: NOT DETECTED

## 2023-06-01 LAB
FINAL PATHOLOGIC DIAGNOSIS: NORMAL
Lab: NORMAL

## 2023-09-12 DIAGNOSIS — N91.2 AMENORRHEA: ICD-10-CM

## 2023-09-12 DIAGNOSIS — E23.0 HYPOPITUITARISM: ICD-10-CM

## 2023-09-12 RX ORDER — ESTRADIOL 0.5 MG/1
0.5 TABLET ORAL DAILY
Qty: 90 TABLET | Refills: 0 | Status: SHIPPED | OUTPATIENT
Start: 2023-09-12 | End: 2024-01-18 | Stop reason: SDUPTHER

## 2023-09-12 RX ORDER — PROGESTERONE 100 MG/1
100 CAPSULE ORAL NIGHTLY
Qty: 12 CAPSULE | Refills: 0 | Status: SHIPPED | OUTPATIENT
Start: 2023-09-12 | End: 2024-01-18 | Stop reason: SDUPTHER

## 2023-09-12 NOTE — TELEPHONE ENCOUNTER
Refill Routing Note   Medication(s) are not appropriate for processing by [facility] for the following reason(s):      Medication outside of protocol    [facility] action(s):  Route Care Due:  None identified          Appointments  past 12m or future 3m with PCP    Date Provider   Last Visit   12/22/2020 Yulissa Alatorre MD   Next Visit   Visit date not found Yulissa Alatorre MD   ED visits in past 90 days: 0        Note composed:1:10 PM 09/12/2023

## 2024-01-17 ENCOUNTER — PATIENT MESSAGE (OUTPATIENT)
Dept: OBSTETRICS AND GYNECOLOGY | Facility: CLINIC | Age: 34
End: 2024-01-17
Payer: COMMERCIAL

## 2024-01-17 DIAGNOSIS — N91.2 AMENORRHEA: ICD-10-CM

## 2024-01-17 DIAGNOSIS — E23.0 HYPOPITUITARISM: ICD-10-CM

## 2024-01-17 RX ORDER — ESTRADIOL 0.5 MG/1
0.5 TABLET ORAL DAILY
Qty: 90 TABLET | Refills: 0 | OUTPATIENT
Start: 2024-01-17

## 2024-01-17 RX ORDER — PROGESTERONE 100 MG/1
100 CAPSULE ORAL NIGHTLY
Qty: 12 CAPSULE | Refills: 0 | OUTPATIENT
Start: 2024-01-17

## 2024-01-18 RX ORDER — PROGESTERONE 100 MG/1
100 CAPSULE ORAL NIGHTLY
Qty: 30 CAPSULE | Refills: 6 | Status: SHIPPED | OUTPATIENT
Start: 2024-01-18 | End: 2024-04-18 | Stop reason: SDUPTHER

## 2024-01-18 RX ORDER — ESTRADIOL 0.5 MG/1
0.5 TABLET ORAL DAILY
Qty: 90 TABLET | Refills: 1 | Status: SHIPPED | OUTPATIENT
Start: 2024-01-18 | End: 2024-04-18 | Stop reason: SDUPTHER

## 2024-04-18 ENCOUNTER — OFFICE VISIT (OUTPATIENT)
Dept: OBSTETRICS AND GYNECOLOGY | Facility: CLINIC | Age: 34
End: 2024-04-18
Payer: COMMERCIAL

## 2024-04-18 VITALS
SYSTOLIC BLOOD PRESSURE: 118 MMHG | DIASTOLIC BLOOD PRESSURE: 72 MMHG | WEIGHT: 104.94 LBS | HEIGHT: 59 IN | BODY MASS INDEX: 21.16 KG/M2

## 2024-04-18 DIAGNOSIS — Z01.419 ENCOUNTER FOR GYNECOLOGICAL EXAMINATION WITHOUT ABNORMAL FINDING: Primary | ICD-10-CM

## 2024-04-18 DIAGNOSIS — N95.2 VAGINAL ATROPHY: ICD-10-CM

## 2024-04-18 DIAGNOSIS — N91.2 AMENORRHEA: ICD-10-CM

## 2024-04-18 DIAGNOSIS — E23.0 HYPOPITUITARISM: ICD-10-CM

## 2024-04-18 PROCEDURE — 99395 PREV VISIT EST AGE 18-39: CPT | Mod: S$GLB,,, | Performed by: OBSTETRICS & GYNECOLOGY

## 2024-04-18 PROCEDURE — 99999 PR PBB SHADOW E&M-EST. PATIENT-LVL III: CPT | Mod: PBBFAC,,, | Performed by: OBSTETRICS & GYNECOLOGY

## 2024-04-18 PROCEDURE — 3008F BODY MASS INDEX DOCD: CPT | Mod: CPTII,S$GLB,, | Performed by: OBSTETRICS & GYNECOLOGY

## 2024-04-18 PROCEDURE — 3078F DIAST BP <80 MM HG: CPT | Mod: CPTII,S$GLB,, | Performed by: OBSTETRICS & GYNECOLOGY

## 2024-04-18 PROCEDURE — 3074F SYST BP LT 130 MM HG: CPT | Mod: CPTII,S$GLB,, | Performed by: OBSTETRICS & GYNECOLOGY

## 2024-04-18 PROCEDURE — 81514 NFCT DS BV&VAGINITIS DNA ALG: CPT | Performed by: OBSTETRICS & GYNECOLOGY

## 2024-04-18 RX ORDER — ESTRADIOL 10 UG/1
10 INSERT VAGINAL
Qty: 8 EACH | Refills: 12 | Status: SHIPPED | OUTPATIENT
Start: 2024-04-18

## 2024-04-18 RX ORDER — PROGESTERONE 100 MG/1
100 CAPSULE ORAL NIGHTLY
Qty: 90 CAPSULE | Refills: 4 | Status: SHIPPED | OUTPATIENT
Start: 2024-04-18

## 2024-04-18 RX ORDER — ESTRADIOL 0.5 MG/1
0.5 TABLET ORAL DAILY
Qty: 90 TABLET | Refills: 4 | Status: SHIPPED | OUTPATIENT
Start: 2024-04-18

## 2024-04-18 RX ORDER — ESTRADIOL 10 UG/1
10 INSERT VAGINAL NIGHTLY
Qty: 18 EACH | Refills: 1 | Status: SHIPPED | OUTPATIENT
Start: 2024-04-18

## 2024-04-18 NOTE — PROGRESS NOTES
"CC: Well woman exam    Ani Montoya is a 34 y.o. female  presents for a well woman exam.  She has vaginal atrophy and discomfort.  At times dyspareunia  POI is on HRT    Past Medical History:   Diagnosis Date    Hypopituitarism     Osteoporosis     Primary amenorrhea        Past Surgical History:   Procedure Laterality Date    breast aug Bilateral     OPEN REDUCTION AND INTERNAL FIXATION (ORIF) OF FRACTURE OF TIBIAL PLATEAU Right 2020    Procedure: ORIF, FRACTURE, TIBIA, PLATEAU;  Surgeon: Jordy Ortega MD;  Location: [address];  Service: Orthopedics;  Laterality: Right;    OPEN REDUCTION AND INTERNAL FIXATION (ORIF) OF INJURY OF WRIST Right 2022    Procedure: ORIF, WRIST mini C arm, med artist;  Surgeon: Tari Adhikari MD;  Location: [facility];  Service: Orthopedics;  Laterality: Right;       OB History    Para Term  AB Living   0 0 0 0 0 0   SAB IAB Ectopic Multiple Live Births   0 0 0 0         Family History   Problem Relation Name Age of Onset    Cancer Maternal Grandfather          unknown    Cancer Cousin          brain    Short stature Maternal Grandmother          4.11`    Breast cancer Neg Hx      Ovarian cancer Neg Hx         Social History     Tobacco Use    Smoking status: Never    Smokeless tobacco: Never   Substance Use Topics    Alcohol use: Yes     Comment: occ    Drug use: No       /72   Ht 4' 11" (1.499 m)   Wt 47.6 kg (104 lb 15 oz)   BMI 21.20 kg/m²     ROS:  GENERAL: Denies weight gain or weight loss. Feeling well overall.   SKIN: Denies rash or lesions.   HEAD: Denies head injury or headache.   NODES: Denies enlarged lymph nodes.   CHEST: Denies chest pain or shortness of breath.   CARDIOVASCULAR: Denies palpitations or left sided chest pain.   ABDOMEN: No abdominal pain, constipation, diarrhea, nausea, vomiting or rectal bleeding.   URINARY: No frequency, dysuria, hematuria, or burning on urination.  REPRODUCTIVE: See HPI.   BREASTS: The " patient performs breast self-examination and denies pain, lumps, or nipple discharge.   HEMATOLOGIC: No easy bruisability or excessive bleeding.  MUSCULOSKELETAL: Denies joint pain or swelling.   NEUROLOGIC: Denies syncope or weakness.   PSYCHIATRIC: Denies depression, anxiety or mood swings.    Physical Exam:    APPEARANCE: Well nourished, well developed, in no acute distress.  AFFECT: WNL, alert and oriented x 3  SKIN: No acne or hirsutism  NECK: Neck symmetric without masses or thyromegaly  NODES: No inguinal, cervical, axillary, or femoral lymph node enlargement  CHEST: Good respiratory effect  ABDOMEN: Soft.  No tenderness or masses.  No hepatosplenomegaly.  No hernias.  BREASTS: Symmetrical, no skin changes or visible lesions.  No palpable masses, nipple discharge bilaterally.  PELVIC: Normal external genitalia without lesions.  Normal hair distribution.  Adequate perineal body, normal urethral meatus.  Vagina moist and well rugated without lesions or discharge.  Cervix pink, without lesions, discharge or tenderness.  No significant cystocele or rectocele.  Bimanual exam shows uterus to be normal size, regular, mobile and nontender.  Adnexa without masses or tenderness.    EXTREMITIES: No edema.    ASSESSMENT AND PLAN  1. Encounter for gynecological examination without abnormal finding  Vaginosis Screen by DNA Probe    Vaginosis Screen by DNA Probe      2. Hypopituitarism  progesterone (PROMETRIUM) 100 MG capsule    estradioL (ESTRACE) 0.5 MG tablet    Vaginosis Screen by DNA Probe    Vaginosis Screen by DNA Probe      3. Amenorrhea  progesterone (PROMETRIUM) 100 MG capsule    estradioL (ESTRACE) 0.5 MG tablet    Vaginosis Screen by DNA Probe    Vaginosis Screen by DNA Probe      4. Vaginal atrophy  estradioL (IMVEXXY MAINTENANCE PACK) 10 mcg Inst    estradioL (IMVEXXY STARTER PACK) 10 mcg InPk    Vaginosis Screen by DNA Probe    Vaginosis Screen by DNA Probe        A full discussion of the benefit-risk ratio  of hormonal replacement therapy was carried out. Improvement in vasomotor and other climacteric symptoms is discussed, including possible improvements in sleep and mood. Reduction of risk for osteoporosis was explained. We discussed the study data showing increased risk of thrombo-embolic events such as myocardial infarction, stroke and also possibly breast cancer with estrogen replacement, and how this might affect her. The range of side effects such as breast tenderness, weight gain and including possible increases in lifetime risk of breast cancer and possible thrombotic complications was discussed. We also discussed ACOG's recommendation to use hormone replacement therapy for the relief of hot flashes alone and to be on the lowest dose possible for the shortest amount of time.  Alternative such as herbal and soy-based products were reviewed. All of her questions about this therapy were answered.      Patient was counseled today on A.C.S. Pap guidelines and recommendations for yearly pelvic exams, mammograms and monthly self breast exams; to see her PCP for other health maintenance.     Follow up in about 1 year (around 4/18/2025), or if symptoms worsen or fail to improve.

## 2024-04-20 LAB
BACTERIAL VAGINOSIS DNA: POSITIVE
CANDIDA GLABRATA DNA: NEGATIVE
CANDIDA KRUSEI DNA: NEGATIVE
CANDIDA RRNA VAG QL PROBE: NEGATIVE
T VAGINALIS RRNA GENITAL QL PROBE: NEGATIVE

## 2024-04-21 DIAGNOSIS — N76.0 BACTERIAL VAGINOSIS: Primary | ICD-10-CM

## 2024-04-21 DIAGNOSIS — B96.89 BACTERIAL VAGINOSIS: Primary | ICD-10-CM

## 2024-04-21 RX ORDER — METRONIDAZOLE 500 MG/1
500 TABLET ORAL 2 TIMES DAILY
Qty: 14 TABLET | Refills: 0 | Status: SHIPPED | OUTPATIENT
Start: 2024-04-21 | End: 2024-05-05

## 2024-04-22 RX ORDER — METRONIDAZOLE 500 MG/1
500 TABLET ORAL EVERY 12 HOURS
Qty: 14 TABLET | Refills: 0 | Status: SHIPPED | OUTPATIENT
Start: 2024-04-22 | End: 2024-04-29

## 2024-04-22 NOTE — TELEPHONE ENCOUNTER
----- Message from Paulo Tracy sent at 4/22/2024  8:57 AM CDT -----      Name of Who is Calling: SANTI ARROYO [[id number]]      What is the request in detail: [facility] called to verify pt script metroNIDAZOLE (FLAGYL) 500 MG tablet.Please contact to further discuss and advise.          Can the clinic reply by [facility]: Y      What Number to Call Back if not in [facility]: [id number] - [place], [place] - [address]

## 2025-06-26 DIAGNOSIS — E23.0 HYPOPITUITARISM: ICD-10-CM

## 2025-06-26 DIAGNOSIS — N91.2 AMENORRHEA: ICD-10-CM

## 2025-06-26 RX ORDER — ESTRADIOL 0.5 MG/1
0.5 TABLET ORAL
Qty: 90 TABLET | Refills: 0 | Status: SHIPPED | OUTPATIENT
Start: 2025-06-26

## 2025-06-26 NOTE — TELEPHONE ENCOUNTER
Refill Routing Note   Medication(s) are not appropriate for processing by [facility] for the following reason(s):        Required vitals outdated    [facility] action(s):  Defer               Appointments  past 12m or future 3m with PCP    Date Provider   Last Visit   4/18/2024 Yulissa Alatorre MD   Next Visit   Visit date not found Yulissa Alatorre MD   ED visits in past 90 days: 0        Note composed:7:44 AM 06/26/2025

## (undated) DEVICE — SPLINT PLASTER F.S 4INX15IN

## (undated) DEVICE — PAD CAST SPECIALIST STRL 3

## (undated) DEVICE — 2.8 DRILL BIT

## (undated) DEVICE — DRAPE C ARM 42 X 120 10/BX

## (undated) DEVICE — BIT DRILL 2.0MMX40MM

## (undated) DEVICE — IMPLANTABLE DEVICE
Type: IMPLANTABLE DEVICE | Site: WRIST | Status: NON-FUNCTIONAL
Removed: 2022-04-07

## (undated) DEVICE — SLING ARM LARGE FOAM STRAP

## (undated) DEVICE — DRAPE STERI U-SHAPED 47X51IN

## (undated) DEVICE — SEE MEDLINE ITEM 152529

## (undated) DEVICE — TOURNIQUET SB QC DP 18X4IN

## (undated) DEVICE — TAPE SURG DURAPORE 2 X10YD

## (undated) DEVICE — SEE MEDLINE ITEM 157150

## (undated) DEVICE — SEE MEDLINE ITEM 146298

## (undated) DEVICE — GAUZE SPONGE 4X4 12PLY

## (undated) DEVICE — GLOVE BIOGEL ECLIPSE SZ 7

## (undated) DEVICE — Device

## (undated) DEVICE — DRAPE PLASTIC U 60X72

## (undated) DEVICE — ADHESIVE DERMABOND ADVANCED

## (undated) DEVICE — CLOSURE SKIN STERI STRIP 1/2X4

## (undated) DEVICE — SUT STRATAFIX 3-0 30CM

## (undated) DEVICE — IMPLANTABLE DEVICE
Type: IMPLANTABLE DEVICE | Site: LEG | Status: NON-FUNCTIONAL
Removed: 2020-08-18

## (undated) DEVICE — GLOVE BIOGEL PI MICRO INDIC 7

## (undated) DEVICE — DRAPE C-ARM MINI DISP

## (undated) DEVICE — BIT BRILL 2.5

## (undated) DEVICE — SEE MEDLINE ITEM 152622

## (undated) DEVICE — SHEET DRAPE FAN-FOLDED 3/4

## (undated) DEVICE — 2.5/2.8 SCREWDRIVER BLADE

## (undated) DEVICE — DRESSING AQUACEL AG ADV 3.5X6

## (undated) DEVICE — SUT MONOCRYL 3-0 PS-2 UND

## (undated) DEVICE — DRESSING N ADH OIL EMUL 3X3

## (undated) DEVICE — DRAPE C-ARMOR EQUIPMENT COVER

## (undated) DEVICE — CORD BIPOLAR 12 FOOT

## (undated) DEVICE — BLADE SURG STAINLESS STEEL #15

## (undated) DEVICE — APPLICATOR CHLORAPREP ORN 26ML

## (undated) DEVICE — SUT ETHICON 3-0 BLK MONO PS

## (undated) DEVICE — SOL 9P NACL IRR PIC IL

## (undated) DEVICE — SUT VICRYL 3-0 27 SH

## (undated) DEVICE — TOURNIQUET SB QC SP 24X4IN

## (undated) DEVICE — SUT VICRYL PLUS 0 CT1 18IN

## (undated) DEVICE — TRAY MINOR ORTHO

## (undated) DEVICE — WRAP COBAN BLUE 1X5YD

## (undated) DEVICE — SUT VICRYL PLUS 3-0 SH 18IN

## (undated) DEVICE — ADHESIVE MASTISOL VIAL 48/BX

## (undated) DEVICE — BANDAGE ESMARK 6X12